# Patient Record
Sex: MALE | Race: WHITE | NOT HISPANIC OR LATINO | Employment: OTHER | URBAN - METROPOLITAN AREA
[De-identification: names, ages, dates, MRNs, and addresses within clinical notes are randomized per-mention and may not be internally consistent; named-entity substitution may affect disease eponyms.]

---

## 2017-03-18 ENCOUNTER — TELEPHONE (OUTPATIENT)
Dept: FAMILY MEDICINE | Facility: CLINIC | Age: 62
End: 2017-03-18

## 2017-04-28 ENCOUNTER — ALLIED HEALTH/NURSE VISIT (OUTPATIENT)
Dept: CARDIOLOGY | Facility: CLINIC | Age: 62
End: 2017-04-28
Payer: COMMERCIAL

## 2017-04-28 DIAGNOSIS — Z45.09 ENCOUNTER FOR LOOP RECORDER CHECK: Primary | ICD-10-CM

## 2017-04-28 PROCEDURE — 93299 C ICM/ILR REMOTE TECH SERV: CPT | Performed by: INTERNAL MEDICINE

## 2017-04-28 PROCEDURE — 93298 REM INTERROG DEV EVAL SCRMS: CPT | Performed by: INTERNAL MEDICINE

## 2017-04-28 NOTE — MR AVS SNAPSHOT
After Visit Summary   4/28/2017    Manan Bryant    MRN: [id number]           Patient Information     Date Of Birth          1955        Visit Information        Provider Department      4/28/2017 4:30 PM PEDERSON DCR2 [facility]        Today's Diagnoses     Encounter for loop recorder check    -  1       Follow-ups after your visit        Your next 10 appointments already scheduled     Jul 12, 2017  3:10 PM CDT   Return Visit with ROWAN Vail CNP   [facility] ([facility])    [address] [place] [address] [address]  [facility] [id number] [phone]            Aug 07, 2017  4:30 PM CDT   Remote ICD Check with PEDERSON DCR2   [facility] ([facility])    [address] [place] [address] [address]  [facility] [id number] [phone]           This appointment is for a remote check of your debrillator.  This is not an appointment at the office.              Who to contact     If you have questions or need follow up information about today's clinic visit or your schedule please contact [facility] directly at [phone].  Normal or non-critical lab and imaging results will be communicated to you by Thermal Nomadhart, letter or phone within 4 business days after the clinic has received the results. If you do not hear from us within 7 days, please contact the clinic through Thermal Nomadhart or phone. If you have a critical or abnormal lab result, we will notify you by phone as soon as possible.  Submit refill requests through Cellartis or call your pharmacy and they will forward the refill request to us. Please allow 3 business days for your refill to be completed.          Additional Information About Your Visit        MyChart Information     Cellartis gives you secure access to your electronic health record. If you see a primary care provider, you can also send  messages to your care team and make appointments. If you have questions, please call your primary care clinic.  If you do not have a primary care provider, please call [phone] and they will assist you.        Care EveryWhere ID     This is your Care EveryWhere ID. This could be used by other organizations to access your [facility] medical records  [id number]         Blood Pressure from Last 3 Encounters:   09/15/16 128/73   08/08/16 134/84   07/07/16 128/82    Weight from Last 3 Encounters:   09/15/16 100.7 kg (222 lb)   08/08/16 99.6 kg (219 lb 9.6 oz)   07/07/16 99.3 kg (219 lb)              We Performed the Following     C ICM DEVICE INTERROGAT REMOTE (18121)     ILR REMOTE TECH SERV (63148)        Primary Care Provider Office Phone # Fax #    Ric Villela [phone]       [facility] [address]  [facility] [address]        Thank you!     Thank you for choosing [facility] [place]  for your care. Our goal is always to provide you with excellent care. Hearing back from our patients is one way we can continue to improve our services. Please take a few minutes to complete the written survey that you may receive in the mail after your visit with us. Thank you!             Your Updated Medication List - Protect others around you: Learn how to safely use, store and throw away your medicines at www.disposemymeds.org.          This list is accurate as of: 4/28/17 11:59 PM.  Always use your most recent med list.                   Brand Name Dispense Instructions for use    ASPIRIN PO      Take 325 mg by mouth.       atorvastatin 40 MG tablet    LIPITOR    90 tablet    Take 1 tablet (40 mg) by mouth daily       ibuprofen 600 MG tablet    ADVIL/MOTRIN    30 tablet    Take 1 tablet (600 mg) by mouth every 6 hours as needed for moderate pain       metoprolol 50 MG 24 hr tablet    TOPROL-XL    90 tablet    Take 1 tablet (50 mg) by mouth  daily       MULTI-VITAMIN PO      Take by mouth daily       nitroglycerin 0.4 MG sublingual tablet    NITROSTAT    25 tablet    Place 1 tablet (0.4 mg) under the tongue every 5 minutes as needed for chest pain       omeprazole 20 MG CR capsule    priLOSEC    90 capsule    Take 1 capsule (20 mg) by mouth daily       TYLENOL PO      Take 1,000 mg by mouth every 4 hours as needed for mild pain or fever       VITAMIN D PO      Take by mouth daily

## 2017-04-28 NOTE — PROGRESS NOTES
Anuradha Loop recorder implant check: Pt sent remote loop recorder check today.   6 patient activated symptoms since last check 9/23/16. 5 with stored strips show SR with occasional PSCs and PVCs.   No VT/philippe,asystole or pauses.   Presenting rhtyhm is SR  Battery status good.     Called pt and reviewed events showing PACs and PVCs. Informed him to call us when he activates a symptom event and we will have him send a transmission. Monitor does not automatically send a pt activated symptoms.  Laurita

## 2017-07-07 ENCOUNTER — DOCUMENTATION ONLY (OUTPATIENT)
Dept: CARDIOLOGY | Facility: CLINIC | Age: 62
End: 2017-07-07

## 2017-07-10 DIAGNOSIS — E78.5 HYPERLIPIDEMIA LDL GOAL <70: ICD-10-CM

## 2017-07-10 DIAGNOSIS — K21.9 GASTROESOPHAGEAL REFLUX DISEASE WITHOUT ESOPHAGITIS: ICD-10-CM

## 2017-07-10 DIAGNOSIS — I25.10 CORONARY ARTERY DISEASE INVOLVING NATIVE CORONARY ARTERY OF NATIVE HEART WITHOUT ANGINA PECTORIS: ICD-10-CM

## 2017-07-10 RX ORDER — METOPROLOL SUCCINATE 50 MG/1
50 TABLET, EXTENDED RELEASE ORAL DAILY
Qty: 90 TABLET | Refills: 0 | Status: SHIPPED | OUTPATIENT
Start: 2017-07-10 | End: 2017-07-12

## 2017-07-10 NOTE — TELEPHONE ENCOUNTER
Left VM for patient to schedule      omeprazole (PRILOSEC) 20 MG CR capsule     Last Written Prescription Date: 5/13/2016  Last Fill Quantity: 90,  # refills: 3   Last Office Visit with [facility] or [facility] prescribing provider: 5/13/2016                                         Next 5 appointments (look out 90 days)     Jul 12, 2017  3:10 PM CDT   Return Visit with ROWAN Vail CNP   [facility] ([facility] [place] [facility])    [address] [place] [id number] [phone]                    atorvastatin (LIPITOR) 40 MG tablet      Last Written Prescription Date: 5/13/2016  Last Fill Quantity: 90, # refills: 3  Last Office Visit with [facility] or [facility] prescribing provider: 5/13/2016  Next 5 appointments (look out 90 days)     Jul 12, 2017  3:10 PM CDT   Return Visit with ROWAN Vail CNP   [facility] ([facility])    [address] [place] [address] [phone]                   Lab Results   Component Value Date    CHOL 126 06/29/2016     Lab Results   Component Value Date    HDL 34 06/29/2016     Lab Results   Component Value Date    LDL 68 06/29/2016     Lab Results   Component Value Date    TRIG 121 06/29/2016     Lab Results   Component Value Date    CHOLHDLRATIO 3.3 06/26/2015

## 2017-07-10 NOTE — TELEPHONE ENCOUNTER
Received refill request for:  Metoprolol Succ  Last OV was: 8/8/2016 with Dr. Wooten  Labs/EKG: n/a  F/U scheduled: 7/12/2017 with JASWANT Beltran  New script sent to: [facility]

## 2017-07-11 DIAGNOSIS — I25.10 CORONARY ARTERY DISEASE INVOLVING NATIVE CORONARY ARTERY OF NATIVE HEART WITHOUT ANGINA PECTORIS: ICD-10-CM

## 2017-07-11 PROCEDURE — 80061 LIPID PANEL: CPT | Performed by: INTERNAL MEDICINE

## 2017-07-11 PROCEDURE — 36415 COLL VENOUS BLD VENIPUNCTURE: CPT | Performed by: INTERNAL MEDICINE

## 2017-07-11 RX ORDER — ATORVASTATIN CALCIUM 40 MG/1
TABLET, FILM COATED ORAL
Qty: 30 TABLET | Refills: 0 | Status: SHIPPED | OUTPATIENT
Start: 2017-07-11 | End: 2017-07-12

## 2017-07-12 ENCOUNTER — OFFICE VISIT (OUTPATIENT)
Dept: CARDIOLOGY | Facility: CLINIC | Age: 62
End: 2017-07-12
Attending: INTERNAL MEDICINE
Payer: COMMERCIAL

## 2017-07-12 VITALS
HEIGHT: 71 IN | HEART RATE: 66 BPM | BODY MASS INDEX: 31.09 KG/M2 | DIASTOLIC BLOOD PRESSURE: 76 MMHG | WEIGHT: 222.1 LBS | SYSTOLIC BLOOD PRESSURE: 138 MMHG

## 2017-07-12 DIAGNOSIS — I25.10 CORONARY ARTERY DISEASE DUE TO CALCIFIED CORONARY LESION: ICD-10-CM

## 2017-07-12 DIAGNOSIS — K21.9 GASTROESOPHAGEAL REFLUX DISEASE WITHOUT ESOPHAGITIS: ICD-10-CM

## 2017-07-12 DIAGNOSIS — I47.10 SVT (SUPRAVENTRICULAR TACHYCARDIA) (H): ICD-10-CM

## 2017-07-12 DIAGNOSIS — I25.84 CORONARY ARTERY DISEASE DUE TO CALCIFIED CORONARY LESION: ICD-10-CM

## 2017-07-12 DIAGNOSIS — I25.10 CORONARY ARTERY DISEASE INVOLVING NATIVE CORONARY ARTERY OF NATIVE HEART WITHOUT ANGINA PECTORIS: ICD-10-CM

## 2017-07-12 DIAGNOSIS — E78.5 HYPERLIPIDEMIA LDL GOAL <70: ICD-10-CM

## 2017-07-12 DIAGNOSIS — I47.20 VT (VENTRICULAR TACHYCARDIA) (H): Primary | ICD-10-CM

## 2017-07-12 PROCEDURE — 99213 OFFICE O/P EST LOW 20 MIN: CPT | Performed by: NURSE PRACTITIONER

## 2017-07-12 RX ORDER — METOPROLOL SUCCINATE 50 MG/1
50 TABLET, EXTENDED RELEASE ORAL DAILY
Qty: 90 TABLET | Refills: 3 | Status: SHIPPED | OUTPATIENT
Start: 2017-07-12 | End: 2018-08-20

## 2017-07-12 RX ORDER — NITROGLYCERIN 0.4 MG/1
0.4 TABLET SUBLINGUAL EVERY 5 MIN PRN
Qty: 25 TABLET | Refills: 3 | Status: SHIPPED | OUTPATIENT
Start: 2017-07-12 | End: 2019-10-04

## 2017-07-12 RX ORDER — ATORVASTATIN CALCIUM 40 MG/1
40 TABLET, FILM COATED ORAL DAILY
Qty: 90 TABLET | Refills: 3 | Status: SHIPPED | OUTPATIENT
Start: 2017-07-12 | End: 2018-07-25

## 2017-07-12 NOTE — LETTER
7/12/2017    Ric Villela MD  [facility]   [address]  [facility] [address]    RE: Manan Bryant       Dear Colleague,    I had the pleasure of seeing Manan Bryant in the [facility].    Cardiology Clinic Progress Note  Manan Bryant MRN# [id number]   YOB: 1955 Age: 61 year old     Reason for visit: Annual follow up           Assessment and Plan:     1. Coronary artery disease    S/p anterior MI and stent to LAD in 2010    Continue current medical therapy with Toprol-XL, Lipitor and aspirin.    Follow-up with Dr. Corley in one year    2. Hypertension    Well controlled on current medical therapy    3. Hyperlidipemia    He had fasting lipids done yesterday through an outside clinic results are currently pending.    4. History of nonsustained VT and PVCs    No inducible VT on EP study    Status post implantable loop recorder    Continue loop recorder transmissions through the device clinic          History of Presenting Illness:    Manan Bryant is a very pleasant 61 year old patient of Dr. Corley who presents today for an annual follow-up.  His past medical history includes an acute anterior MI in August 2010.  Unfortunately, he did not seek medical attention for three days from the onset of pain.  His symptoms were muscle weakness and the patient initially thought he had Lyme's disease.  His LVEF at that time was 55-60% with apical hypokinesis.  Shortly after his hospitalization he was noted to have nonsustained VT and PVCs and was treated with beta blockers.    She was driving his car and suddenly became severely lightheaded.  His vision started to decrease and he felt like he might lose consciousness.  Fortunately was able to move his car to the side of the road and never did lose consciousness.  There is a cardiac event monitor showed nonsustained irregular atrial tachycardia for which she was asymptomatic.  He was seen by  "Dr. Wooten electrophysiology and ultimately underwent a EP study that did not induce atrial tachycardia.  He underwent an implantable loop recorder and to date has had no significant arrhythmias.    Today in clinic he states he occasionally has palpitations but as stated above there's been no significant arrhythmias other than PACs and PVCs.  He is currently tolerating his current dose of Toprol-XL at 50 mg daily.  He states that he has more difficult to respect the needed prior to being on beta blocker therapy but is currently able to bike at the level he desires.  He denies any shortness of breath on exertion, chest discomfort, peripheral edema, PND or orthopnea.          This note was completed in part using Dragon voice recognition software. Although reviewed after completion, some word and grammatical errors may occur       Review of Systems:   Review of Systems:  Skin:  Negative     Eyes:  Positive for glasses  ENT:  Negative    Respiratory:  Negative    Cardiovascular:  Negative for;chest pain palpitations;Positive for;lightheadedness;dizziness;fatigue  Gastroenterology: Positive for    Genitourinary:  Negative    Musculoskeletal:  Positive for joint pain  Neurologic:  Negative    Psychiatric:  Negative    Heme/Lymph/Imm:  Negative    Endocrine:  Negative                Physical Exam:     Vitals: /76  Pulse 66  Ht 1.803 m (5' 10.98\")  Wt 100.7 kg (222 lb 1.6 oz)  BMI 30.99 kg/m2  Constitutional:  cooperative, alert and oriented, well developed, well nourished, in no acute distress        Skin:  warm and dry to the touch, no apparent skin lesions or masses noted        Head:  normocephalic, no masses or lesions        Eyes:  pupils equal and round;conjunctivae and lids unremarkable        ENT:  no pallor or cyanosis, dentition good        Chest:  clear to auscultation;normal symmetry        Cardiac: regular rhythm;normal S1 and S2;no S3 or S4;no murmurs, gallops or rubs detected              "     Abdomen:  abdomen soft;non-tender        Extremities and Back:  no edema        Neurological:  no gross motor deficits;affect appropriate                 Medications:     Current Outpatient Prescriptions   Medication Sig Dispense Refill     omeprazole (PRILOSEC) 20 MG CR capsule Take 1 capsule (20 mg) by mouth daily 90 capsule 3     nitroGLYcerin (NITROSTAT) 0.4 MG sublingual tablet Place 1 tablet (0.4 mg) under the tongue every 5 minutes as needed for chest pain 25 tablet 3     metoprolol (TOPROL-XL) 50 MG 24 hr tablet Take 1 tablet (50 mg) by mouth daily 90 tablet 3     atorvastatin (LIPITOR) 40 MG tablet Take 1 tablet (40 mg) by mouth daily 90 tablet 3     Acetaminophen (TYLENOL PO) Take 1,000 mg by mouth every 4 hours as needed for mild pain or fever       ibuprofen (ADVIL,MOTRIN) 600 MG tablet Take 1 tablet (600 mg) by mouth every 6 hours as needed for moderate pain 30 tablet 1     Multiple Vitamin (MULTI-VITAMIN PO) Take by mouth daily        Cholecalciferol (VITAMIN D PO) Take by mouth daily        ASPIRIN PO Take 325 mg by mouth.       [DISCONTINUED] atorvastatin (LIPITOR) 40 MG tablet TAKE ONE TABLET BY MOUTH ONCE DAILY 30 tablet 0     [DISCONTINUED] metoprolol (TOPROL-XL) 50 MG 24 hr tablet Take 1 tablet (50 mg) by mouth daily 90 tablet 0     [DISCONTINUED] nitroglycerin (NITROSTAT) 0.4 MG SL tablet Place 1 tablet (0.4 mg) under the tongue every 5 minutes as needed for chest pain 25 tablet 11           Family History   Problem Relation Age of Onset     Coronary Artery Disease Father      Myocardial Infarction Father      Coronary Artery Disease Brother      Myocardial Infarction Brother      Heart Surgery Brother      bypass     Coronary Artery Disease Paternal Grandfather      Myocardial Infarction Paternal Grandfather      Sleep Apnea Sister      Family History Negative Mother      Family History Negative Maternal Grandmother      Heart Failure Maternal Grandfather      Family History Negative  Paternal Grandmother      Myocardial Infarction Brother      Coronary Artery Disease Brother      Heart Surgery Brother      bypass       Social History     Social History     Marital status:      Spouse name: N/A     Number of children: N/A     Years of education: N/A     Occupational History     Not on file.     Social History Main Topics     Smoking status: Never Smoker     Smokeless tobacco: Never Used     Alcohol use No     Drug use: No     Sexual activity: No     Other Topics Concern     Caffeine Concern No     decaf: 1-2 cups a day. Diek coke: 4-5 cans a day     Sleep Concern No     Stress Concern No     Weight Concern No     Special Diet Yes     limited, heart healthy     Exercise Yes     bike outside, 1-3x a week     Bike Helmet Yes     Seat Belt Yes     Social History Narrative            Past Medical History:     Past Medical History:   Diagnosis Date     CAD (coronary artery disease)     cardiac cath 8/5/2010: ELLIOTT to LAD     Family history of early CAD      History of acute anterior wall MI 2010 2010     History of colonic polyps      Hyperlipidemia LDL goal <70      Myocardial infarction (H)      Paroxysmal supraventricular tachycardia (H)      PVC (premature ventricular contraction)     Hx ventricular tachycardia during cardiac rehab 2010     SVT (supraventricular tachycardia) (H)               Past Surgical History:     Past Surgical History:   Procedure Laterality Date     STENT, CORONARY, EMEKA  8/2010    LAD              Allergies:   Review of patient's allergies indicates no known allergies.       Data:   All laboratory data reviewed:    LAST CHOLESTEROL:  Lab Results   Component Value Date    CHOL 126 06/29/2016     Lab Results   Component Value Date    HDL 34 06/29/2016     Lab Results   Component Value Date    LDL 68 06/29/2016     Lab Results   Component Value Date    TRIG 121 06/29/2016     Lab Results   Component Value Date    CHOLHDLRATIO 3.3 06/26/2015       LAST BMP:  Lab Results    Component Value Date     09/15/2016      Lab Results   Component Value Date    POTASSIUM 4.5 09/15/2016     Lab Results   Component Value Date    CHLORIDE 109 09/15/2016     Lab Results   Component Value Date    LES 8.8 09/15/2016     Lab Results   Component Value Date    CO2 25 09/15/2016     Lab Results   Component Value Date    BUN 16 09/15/2016     Lab Results   Component Value Date    CR 0.98 09/15/2016     Lab Results   Component Value Date    GLC 99 09/15/2016       LAST CBC:  Lab Results   Component Value Date    WBC 5.7 09/15/2016     Lab Results   Component Value Date    RBC 5.07 09/15/2016     Lab Results   Component Value Date    HGB 15.8 09/15/2016     Lab Results   Component Value Date    HCT 44.7 09/15/2016     Lab Results   Component Value Date    MCV 88 09/15/2016     Lab Results   Component Value Date    MCH 31.2 09/15/2016     Lab Results   Component Value Date    MCHC 35.3 09/15/2016     Lab Results   Component Value Date    RDW 12.9 09/15/2016     Lab Results   Component Value Date     09/15/2016       Thank you for allowing me to participate in the care of your patient.    Sincerely,     ROWAN HERMOSILLO [facility]

## 2017-07-12 NOTE — MR AVS SNAPSHOT
After Visit Summary   7/12/2017    Manan Bryant    MRN: [id number]           Patient Information     Date Of Birth          1955        Visit Information        Provider Department      7/12/2017 3:10 PM Elizabeth Richardson APRN CNP [facility]        Today's Diagnoses     VT (ventricular tachycardia) (H)    -  1    Coronary artery disease involving native coronary artery of native heart without angina pectoris        SVT (supraventricular tachycardia) (H)        Gastroesophageal reflux disease without esophagitis        Coronary artery disease due to calcified coronary lesion        Hyperlipidemia LDL goal <70           Follow-ups after your visit        Your next 10 appointments already scheduled     Aug 07, 2017  4:30 PM CDT   Remote ICD Check with PEDERSON DCR2   [facility] ([facility])    [address] [place] [address] [phone]           This appointment is for a remote check of your debrillator.  This is not an appointment at the office.              Who to contact     If you have questions or need follow up information about today's clinic visit or your schedule please contact [facility] directly at [phone].  Normal or non-critical lab and imaging results will be communicated to you by TranZfinityhart, letter or phone within 4 business days after the clinic has received the results. If you do not hear from us within 7 days, please contact the clinic through TranZfinityhart or phone. If you have a critical or abnormal lab result, we will notify you by phone as soon as possible.  Submit refill requests through Shopnation or call your pharmacy and they will forward the refill request to us. Please allow 3 business days for your refill to be completed.          Additional Information About Your Visit        MyChart Information     Shopnation gives you secure  "access to your electronic health record. If you see a primary care provider, you can also send messages to your care team and make appointments. If you have questions, please call your primary care clinic.  If you do not have a primary care provider, please call [phone] and they will assist you.        Care EveryWhere ID     This is your Care EveryWhere ID. This could be used by other organizations to access your [facility] medical records  [id number]        Your Vitals Were     Pulse Height BMI (Body Mass Index)             66 1.803 m (5' 10.98\") 30.99 kg/m2          Blood Pressure from Last 3 Encounters:   07/12/17 138/76   09/15/16 128/73   08/08/16 134/84    Weight from Last 3 Encounters:   07/12/17 100.7 kg (222 lb 1.6 oz)   09/15/16 100.7 kg (222 lb)   08/08/16 99.6 kg (219 lb 9.6 oz)              We Performed the Following     Follow-Up with Cardiac Advanced Practice Provider          Today's Medication Changes          These changes are accurate as of: 7/12/17  3:31 PM.  If you have any questions, ask your nurse or doctor.               These medicines have changed or have updated prescriptions.        Dose/Directions    atorvastatin 40 MG tablet   Commonly known as:  LIPITOR   This may have changed:  See the new instructions.   Used for:  Hyperlipidemia LDL goal <70   Changed by:  Elizabeth Richardson APRN CNP        Dose:  40 mg   Take 1 tablet (40 mg) by mouth daily   Quantity:  90 tablet   Refills:  3       omeprazole 20 MG CR capsule   Commonly known as:  priLOSEC   This may have changed:  See the new instructions.   Used for:  Gastroesophageal reflux disease without esophagitis   Changed by:  Elizabeth Richardson APRN CNP        Dose:  20 mg   Take 1 capsule (20 mg) by mouth daily   Quantity:  90 capsule   Refills:  3            Where to get your medicines      These medications were sent to [id number] - [place], [place] - [address] AT [address]  [address], " [place] [address]     Phone:  [phone]     atorvastatin 40 MG tablet    metoprolol 50 MG 24 hr tablet    nitroGLYcerin 0.4 MG sublingual tablet    omeprazole 20 MG CR capsule                Primary Care Provider Office Phone # Fax #    Ric Villela [phone]       [facility] [address]  [facility] [address]        Equal Access to Services     ANAYELI ERIC : Hadii aad ku hadasho Soomaali, waaxda luqadaha, qaybta kaalmada adeegyada, waxay idiin hayaan adeeg kharash la'sarahn . So [facility] [phone].    ATENCIÓN: Si habla espehsan, tiene a das disposición servicios gratuitos de asistencia lingüística. Jeevaname al [phone].    We comply with applicable federal civil rights laws and [place] laws. We do not discriminate on the basis of race, color, national origin, age, disability sex, sexual orientation or gender identity.            Thank you!     Thank you for choosing [facility] [place]  for your care. Our goal is always to provide you with excellent care. Hearing back from our patients is one way we can continue to improve our services. Please take a few minutes to complete the written survey that you may receive in the mail after your visit with us. Thank you!             Your Updated Medication List - Protect others around you: Learn how to safely use, store and throw away your medicines at www.disposemymeds.org.          This list is accurate as of: 7/12/17  3:31 PM.  Always use your most recent med list.                   Brand Name Dispense Instructions for use Diagnosis    ASPIRIN PO      Take 325 mg by mouth.        atorvastatin 40 MG tablet    LIPITOR    90 tablet    Take 1 tablet (40 mg) by mouth daily    Hyperlipidemia LDL goal <70       ibuprofen 600 MG tablet    ADVIL/MOTRIN    30 tablet    Take 1 tablet (600 mg) by mouth every 6 hours as needed for moderate pain        metoprolol 50 MG 24 hr tablet    TOPROL-XL    90  tablet    Take 1 tablet (50 mg) by mouth daily    Coronary artery disease involving native coronary artery of native heart without angina pectoris       MULTI-VITAMIN PO      Take by mouth daily        nitroGLYcerin 0.4 MG sublingual tablet    NITROSTAT    25 tablet    Place 1 tablet (0.4 mg) under the tongue every 5 minutes as needed for chest pain    Coronary artery disease due to calcified coronary lesion       omeprazole 20 MG CR capsule    priLOSEC    90 capsule    Take 1 capsule (20 mg) by mouth daily    Gastroesophageal reflux disease without esophagitis       TYLENOL PO      Take 1,000 mg by mouth every 4 hours as needed for mild pain or fever        VITAMIN D PO      Take by mouth daily

## 2017-07-12 NOTE — PROGRESS NOTES
Cardiology Clinic Progress Note  Manan Bryant MRN# [id number]   YOB: 1955 Age: 61 year old     Reason for visit: Annual follow up           Assessment and Plan:     1. Coronary artery disease    S/p anterior MI and stent to LAD in 2010    Continue current medical therapy with Toprol-XL, Lipitor and aspirin.    Follow-up with Dr. Corley in one year    2. Hypertension    Well controlled on current medical therapy    3. Hyperlidipemia    He had fasting lipids done yesterday through an outside clinic results are currently pending.    4. History of nonsustained VT and PVCs    No inducible VT on EP study    Status post implantable loop recorder    Continue loop recorder transmissions through the device clinic          History of Presenting Illness:    Manan Bryant is a very pleasant 61 year old patient of Dr. Corley who presents today for an annual follow-up.  His past medical history includes an acute anterior MI in August 2010.  Unfortunately, he did not seek medical attention for three days from the onset of pain.  His symptoms were muscle weakness and the patient initially thought he had Lyme's disease.  His LVEF at that time was 55-60% with apical hypokinesis.  Shortly after his hospitalization he was noted to have nonsustained VT and PVCs and was treated with beta blockers.    She was driving his car and suddenly became severely lightheaded.  His vision started to decrease and he felt like he might lose consciousness.  Fortunately was able to move his car to the side of the road and never did lose consciousness.  There is a cardiac event monitor showed nonsustained irregular atrial tachycardia for which she was asymptomatic.  He was seen by Dr. Wooten electrophysiology and ultimately underwent a EP study that did not induce atrial tachycardia.  He underwent an implantable loop recorder and to date has had no significant arrhythmias.    Today in clinic he states he occasionally has  "palpitations but as stated above there's been no significant arrhythmias other than PACs and PVCs.  He is currently tolerating his current dose of Toprol-XL at 50 mg daily.  He states that he has more difficult to respect the needed prior to being on beta blocker therapy but is currently able to bike at the level he desires.  He denies any shortness of breath on exertion, chest discomfort, peripheral edema, PND or orthopnea.          This note was completed in part using Dragon voice recognition software. Although reviewed after completion, some word and grammatical errors may occur       Review of Systems:   Review of Systems:  Skin:  Negative     Eyes:  Positive for glasses  ENT:  Negative    Respiratory:  Negative    Cardiovascular:  Negative for;chest pain palpitations;Positive for;lightheadedness;dizziness;fatigue  Gastroenterology: Positive for    Genitourinary:  Negative    Musculoskeletal:  Positive for joint pain  Neurologic:  Negative    Psychiatric:  Negative    Heme/Lymph/Imm:  Negative    Endocrine:  Negative                Physical Exam:     Vitals: /76  Pulse 66  Ht 1.803 m (5' 10.98\")  Wt 100.7 kg (222 lb 1.6 oz)  BMI 30.99 kg/m2  Constitutional:  cooperative, alert and oriented, well developed, well nourished, in no acute distress        Skin:  warm and dry to the touch, no apparent skin lesions or masses noted        Head:  normocephalic, no masses or lesions        Eyes:  pupils equal and round;conjunctivae and lids unremarkable        ENT:  no pallor or cyanosis, dentition good        Chest:  clear to auscultation;normal symmetry        Cardiac: regular rhythm;normal S1 and S2;no S3 or S4;no murmurs, gallops or rubs detected                  Abdomen:  abdomen soft;non-tender        Extremities and Back:  no edema        Neurological:  no gross motor deficits;affect appropriate                 Medications:     Current Outpatient Prescriptions   Medication Sig Dispense Refill     " omeprazole (PRILOSEC) 20 MG CR capsule Take 1 capsule (20 mg) by mouth daily 90 capsule 3     nitroGLYcerin (NITROSTAT) 0.4 MG sublingual tablet Place 1 tablet (0.4 mg) under the tongue every 5 minutes as needed for chest pain 25 tablet 3     metoprolol (TOPROL-XL) 50 MG 24 hr tablet Take 1 tablet (50 mg) by mouth daily 90 tablet 3     atorvastatin (LIPITOR) 40 MG tablet Take 1 tablet (40 mg) by mouth daily 90 tablet 3     Acetaminophen (TYLENOL PO) Take 1,000 mg by mouth every 4 hours as needed for mild pain or fever       ibuprofen (ADVIL,MOTRIN) 600 MG tablet Take 1 tablet (600 mg) by mouth every 6 hours as needed for moderate pain 30 tablet 1     Multiple Vitamin (MULTI-VITAMIN PO) Take by mouth daily        Cholecalciferol (VITAMIN D PO) Take by mouth daily        ASPIRIN PO Take 325 mg by mouth.       [DISCONTINUED] atorvastatin (LIPITOR) 40 MG tablet TAKE ONE TABLET BY MOUTH ONCE DAILY 30 tablet 0     [DISCONTINUED] metoprolol (TOPROL-XL) 50 MG 24 hr tablet Take 1 tablet (50 mg) by mouth daily 90 tablet 0     [DISCONTINUED] nitroglycerin (NITROSTAT) 0.4 MG SL tablet Place 1 tablet (0.4 mg) under the tongue every 5 minutes as needed for chest pain 25 tablet 11           Family History   Problem Relation Age of Onset     Coronary Artery Disease Father      Myocardial Infarction Father      Coronary Artery Disease Brother      Myocardial Infarction Brother      Heart Surgery Brother      bypass     Coronary Artery Disease Paternal Grandfather      Myocardial Infarction Paternal Grandfather      Sleep Apnea Sister      Family History Negative Mother      Family History Negative Maternal Grandmother      Heart Failure Maternal Grandfather      Family History Negative Paternal Grandmother      Myocardial Infarction Brother      Coronary Artery Disease Brother      Heart Surgery Brother      bypass       Social History     Social History     Marital status:      Spouse name: N/A     Number of children: N/A      Years of education: N/A     Occupational History     Not on file.     Social History Main Topics     Smoking status: Never Smoker     Smokeless tobacco: Never Used     Alcohol use No     Drug use: No     Sexual activity: No     Other Topics Concern     Caffeine Concern No     decaf: 1-2 cups a day. Diek coke: 4-5 cans a day     Sleep Concern No     Stress Concern No     Weight Concern No     Special Diet Yes     limited, heart healthy     Exercise Yes     bike outside, 1-3x a week     Bike Helmet Yes     Seat Belt Yes     Social History Narrative            Past Medical History:     Past Medical History:   Diagnosis Date     CAD (coronary artery disease)     cardiac cath 8/5/2010: ELLIOTT to LAD     Family history of early CAD      History of acute anterior wall MI 2010 2010     History of colonic polyps      Hyperlipidemia LDL goal <70      Myocardial infarction (H)      Paroxysmal supraventricular tachycardia (H)      PVC (premature ventricular contraction)     Hx ventricular tachycardia during cardiac rehab 2010     SVT (supraventricular tachycardia) (H)               Past Surgical History:     Past Surgical History:   Procedure Laterality Date     STENT, CORONARY, EMEKA  8/2010    LAD              Allergies:   Review of patient's allergies indicates no known allergies.       Data:   All laboratory data reviewed:    LAST CHOLESTEROL:  Lab Results   Component Value Date    CHOL 126 06/29/2016     Lab Results   Component Value Date    HDL 34 06/29/2016     Lab Results   Component Value Date    LDL 68 06/29/2016     Lab Results   Component Value Date    TRIG 121 06/29/2016     Lab Results   Component Value Date    CHOLHDLRATIO 3.3 06/26/2015       LAST BMP:  Lab Results   Component Value Date     09/15/2016      Lab Results   Component Value Date    POTASSIUM 4.5 09/15/2016     Lab Results   Component Value Date    CHLORIDE 109 09/15/2016     Lab Results   Component Value Date    LES 8.8 09/15/2016     Lab  Results   Component Value Date    CO2 25 09/15/2016     Lab Results   Component Value Date    BUN 16 09/15/2016     Lab Results   Component Value Date    CR 0.98 09/15/2016     Lab Results   Component Value Date    GLC 99 09/15/2016       LAST CBC:  Lab Results   Component Value Date    WBC 5.7 09/15/2016     Lab Results   Component Value Date    RBC 5.07 09/15/2016     Lab Results   Component Value Date    HGB 15.8 09/15/2016     Lab Results   Component Value Date    HCT 44.7 09/15/2016     Lab Results   Component Value Date    MCV 88 09/15/2016     Lab Results   Component Value Date    MCH 31.2 09/15/2016     Lab Results   Component Value Date    MCHC 35.3 09/15/2016     Lab Results   Component Value Date    RDW 12.9 09/15/2016     Lab Results   Component Value Date     09/15/2016         CECELIA PARADA, ROWAN, CNP

## 2017-07-13 LAB
CHOLEST SERPL-MCNC: 140 MG/DL
HDLC SERPL-MCNC: 45 MG/DL
LDLC SERPL CALC-MCNC: 66 MG/DL
NONHDLC SERPL-MCNC: 95 MG/DL
TRIGL SERPL-MCNC: 143 MG/DL

## 2017-07-14 ENCOUNTER — TELEPHONE (OUTPATIENT)
Dept: CARDIOLOGY | Facility: CLINIC | Age: 62
End: 2017-07-14

## 2017-07-14 NOTE — TELEPHONE ENCOUNTER
Patient would like his lipid profile released to My Chart.  Could you please show me how to do that?  INDER Azul

## 2017-08-15 ENCOUNTER — DOCUMENTATION ONLY (OUTPATIENT)
Dept: CARDIOLOGY | Facility: CLINIC | Age: 62
End: 2017-08-15

## 2017-08-15 NOTE — PROGRESS NOTES
Patient missed [employer] transmission on 8/7/17. Sent letter 8/8, no response. Sent 1 week letter today

## 2018-01-25 ENCOUNTER — ALLIED HEALTH/NURSE VISIT (OUTPATIENT)
Dept: CARDIOLOGY | Facility: CLINIC | Age: 63
End: 2018-01-25
Payer: COMMERCIAL

## 2018-01-25 ENCOUNTER — TELEPHONE (OUTPATIENT)
Dept: CARDIOLOGY | Facility: CLINIC | Age: 63
End: 2018-01-25

## 2018-01-25 ENCOUNTER — DOCUMENTATION ONLY (OUTPATIENT)
Dept: CARDIOLOGY | Facility: CLINIC | Age: 63
End: 2018-01-25

## 2018-01-25 DIAGNOSIS — Z45.09 ENCOUNTER FOR LOOP RECORDER CHECK: Primary | ICD-10-CM

## 2018-01-25 DIAGNOSIS — I47.20 VT (VENTRICULAR TACHYCARDIA) (H): ICD-10-CM

## 2018-01-25 PROCEDURE — 93299 C ICM/ILR REMOTE TECH SERV: CPT | Performed by: INTERNAL MEDICINE

## 2018-01-25 PROCEDURE — 93298 REM INTERROG DEV EVAL SCRMS: CPT | Performed by: INTERNAL MEDICINE

## 2018-01-25 NOTE — MR AVS SNAPSHOT
After Visit Summary   1/25/2018    Manan Bryant    MRN: [id number]           Patient Information     Date Of Birth          1955        Visit Information        Provider Department      1/25/2018 4:30 PM PEDERSON DCR2 [facility]        Today's Diagnoses     Encounter for loop recorder check    -  1    VT (ventricular tachycardia) (H)           Follow-ups after your visit        Your next 10 appointments already scheduled     Jan 25, 2018  4:30 PM CST   Remote ICD Check with PEDERSON DCR2   [facility] ([facility])    [id number]  [facility] [id number] [phone]           This appointment is for a remote check of your debrillator.  This is not an appointment at the office.            Jan 29, 2018 11:00 AM CST   Return Visit with ROWAN aVil CNP   [facility] ([facility])    [id number]  [facility] [id number] [phone]            May 14, 2018  4:30 PM CDT   Remote ICD Check with PEDESRON DCR2   [facility] ([facility])    [address] [address] [phone]           This appointment is for a remote check of your debrillator.  This is not an appointment at the office.              Who to contact     If you have questions or need follow up information about today's clinic visit or your schedule please contact [facility] directly at [phone].  Normal or non-critical lab and imaging results will be communicated to you by MyChart, letter or phone within 4 business days after the clinic has received the results. If you do not hear from us within 7 days, please contact the clinic through MyChart or phone. If you have a critical or abnormal lab result, we will notify you by phone as soon as possible.  Submit refill  requests through [employer] or call your pharmacy and they will forward the refill request to us. Please allow 3 business days for your refill to be completed.          Additional Information About Your Visit        [facility] Information     [employer] gives you secure access to your electronic health record. If you see a primary care provider, you can also send messages to your care team and make appointments. If you have questions, please call your primary care clinic.  If you do not have a primary care provider, please call [phone] and they will assist you.        Care EveryWhere ID     This is your Care EveryWhere ID. This could be used by other organizations to access your [place] medical records  [id number]         Blood Pressure from Last 3 Encounters:   07/12/17 138/76   09/15/16 128/73   08/08/16 134/84    Weight from Last 3 Encounters:   07/12/17 100.7 kg (222 lb 1.6 oz)   09/15/16 100.7 kg (222 lb)   08/08/16 99.6 kg (219 lb 9.6 oz)              We Performed the Following     C ICM DEVICE INTERROGAT REMOTE (24016)     ILR REMOTE TECH SERV (44483)        Primary Care Provider Office Phone # Fax #    Ric Marcos Villela [phone] [address] [address]        Equal Access to Services     [facility]: Hadii aad ku hadasho Soomaali, waaxda luqadaha, qaybta kaalmada adeegyada, waxay idiin hayaan riri quiles . So [facility] [phone].    ATENCIÓN: Si habla español, tiene a das disposición servicios gratuitos de asistencia lingüística. Sandra al [phone].    We comply with applicable federal civil rights laws and [place] laws. We do not discriminate on the basis of race, color, national origin, age, disability, sex, sexual orientation, or gender identity.            Thank you!     Thank you for choosing [facility]  for your care. Our goal is always to provide you with excellent care. Hearing back from our patients  is one way we can continue to improve our services. Please take a few minutes to complete the written survey that you may receive in the mail after your visit with us. Thank you!             Your Updated Medication List - Protect others around you: Learn how to safely use, store and throw away your medicines at www.disposemymeds.org.          This list is accurate as of 1/25/18  1:19 PM.  Always use your most recent med list.                   Brand Name Dispense Instructions for use Diagnosis    ASPIRIN PO      Take 325 mg by mouth.        atorvastatin 40 MG tablet    LIPITOR    90 tablet    Take 1 tablet (40 mg) by mouth daily    Hyperlipidemia LDL goal <70       ibuprofen 600 MG tablet    ADVIL/MOTRIN    30 tablet    Take 1 tablet (600 mg) by mouth every 6 hours as needed for moderate pain        metoprolol succinate 50 MG 24 hr tablet    TOPROL-XL    90 tablet    Take 1 tablet (50 mg) by mouth daily    Coronary artery disease involving native coronary artery of native heart without angina pectoris       MULTI-VITAMIN PO      Take by mouth daily        nitroGLYcerin 0.4 MG sublingual tablet    NITROSTAT    25 tablet    Place 1 tablet (0.4 mg) under the tongue every 5 minutes as needed for chest pain    Coronary artery disease due to calcified coronary lesion       omeprazole 20 MG CR capsule    priLOSEC    90 capsule    Take 1 capsule (20 mg) by mouth daily    Gastroesophageal reflux disease without esophagitis       TYLENOL PO      Take 1,000 mg by mouth every 4 hours as needed for mild pain or fever        VITAMIN D PO      Take by mouth daily

## 2018-01-25 NOTE — LETTER
Manan Bryant    [address] [place] [address]    January 25, 2018      Dear Manan Bryant,     Your transmission sent on 1-25-18 has been reviewed. It was determined the results are normal. No events were detected.   _____ Your next scheduled date for you to send a transmission is on 5-14-18.   _____ You are due for an in office appointment, please call [phone] to arrange.   Please call Eduardo at [phone] to change your appointment if needed. You may call and leave a message for a device RN if you have any questions at [phone] and they will return your call. Device clinic hours: Monday - Friday  8:00am-4:00pm   Sincerely,   Rosalie Wise RN

## 2018-01-25 NOTE — TELEPHONE ENCOUNTER
Pt left message asking if his remote ILR check came through. Called pt back, told him we got the remote and the results were normal. Informed him of next remote in May, and reminded him of OV with Elizabeth RILEY on Monday.

## 2018-01-25 NOTE — PROGRESS NOTES
[employer] Reveal Linq Loop Recorder   Symptom: 0 Tachy: 0 Pause: 0 Abraham: 0  AT: 0 AF: 0  Time in AT/AF: 0 %  Heart rate: Stable with adequate variability Battery: OK  Careplan: Next remote in 3 months. INDER Gastelum

## 2018-01-25 NOTE — PROGRESS NOTES
Call from scheduling stating that the patient is out front experiencing increased PVCs and fatigue, especially within the last 24 hours. Patient does have a loop recorder. Spoke with patient face to face. Patient denies any chest pain, shortness of breath, or lightheadedness. Patient is only worried because of the increased amount of PVCs and increased fatigue this morning. Patient states that he contacted his primary doctor who told him to have a strip run at the cardiology clinic. Patient also states he has an OV on Monday 1/29 with TYREL Elizabeth Balaya. Spoke with EP RN. Per EP RN, patient has not been in for a device check recently. Per EP RN, patient is able to send a transmission from home into the office and they are able to see what is going on. Reviewed this recommendation in person with patient. Patient verbalized understanding and agreed with plan of care. Also reviewed with patient to seek ER care if he develops any symptoms. Patient verbalized understanding and agreed with plan of care.

## 2018-05-18 ENCOUNTER — TELEPHONE (OUTPATIENT)
Dept: CARDIOLOGY | Facility: CLINIC | Age: 63
End: 2018-05-18

## 2018-05-18 DIAGNOSIS — I10 BENIGN ESSENTIAL HYPERTENSION: Primary | ICD-10-CM

## 2018-05-18 RX ORDER — LISINOPRIL 5 MG/1
5 TABLET ORAL DAILY
Qty: 30 TABLET | Refills: 0 | Status: SHIPPED | OUTPATIENT
Start: 2018-05-18 | End: 2018-05-21

## 2018-05-18 NOTE — TELEPHONE ENCOUNTER
Spoke with patient about Elizabeth's recommendations to add 5 mg of lisinopril daily and log his BP's over the weekend and bring with to the OV. Patient verbalized understanding and agreed with plan of care. Rx escripted to patient's preferred pharmacy.

## 2018-05-18 NOTE — TELEPHONE ENCOUNTER
Please have him start Lisinopril 5 mg daily and bring a log of his weekend BPs to our visit. Thanks Elizabeth

## 2018-05-18 NOTE — TELEPHONE ENCOUNTER
Call back from patient. Patient states that he went to the [facility] in [facility] and they did not have a BP cuff. Patient states that he decided to purchase a wrist BP cuff, as he wanted to invest in one that would be portable and he could take it with him. Patient states that he has taken in a couple times in the past hour and a half, and it has been running in the 150s-160s/90s. Patient still endorses a headache, but denies any shortness of breath or chest pain. Patient states that he did make an appointment for Monday, May 21 with Elizabeth, but patient would like to know if he needs to make any medication changes prior to Monday. Will route to Elizabeth for review in Dr. Corley's absence.

## 2018-05-18 NOTE — TELEPHONE ENCOUNTER
VM from patient stating that he has been having high BP readings in the evening, around 160-170/90s, which is not normal for the patient. Patient is wondering if his BP cuff at home is still working right. Spoke with patient. Patient states that he takes 50 mg of Toprol XL in the morning and normally only checks his BP in the evening after work. Patient states that it has never been this high after work before. Patient states this has been happening for about a week. Patient states that he has a headache in the evening now when his BP is running that high. Patient states that his wife also thinks he looks flushed. Patient is wondering if his cuff isn't normal. Instructed patient to go to a local [facility] or [facility] to the pharmacy and to take his BP there and call back with the reading. Patient verbalized understanding and agreed with plan of care.

## 2018-05-20 ENCOUNTER — MYC MEDICAL ADVICE (OUTPATIENT)
Dept: FAMILY MEDICINE | Facility: CLINIC | Age: 63
End: 2018-05-20

## 2018-05-20 DIAGNOSIS — Z00.00 ENCOUNTER FOR ROUTINE ADULT HEALTH EXAMINATION WITHOUT ABNORMAL FINDINGS: Primary | ICD-10-CM

## 2018-05-21 ENCOUNTER — OFFICE VISIT (OUTPATIENT)
Dept: CARDIOLOGY | Facility: CLINIC | Age: 63
End: 2018-05-21
Payer: COMMERCIAL

## 2018-05-21 ENCOUNTER — ALLIED HEALTH/NURSE VISIT (OUTPATIENT)
Dept: CARDIOLOGY | Facility: CLINIC | Age: 63
End: 2018-05-21
Payer: COMMERCIAL

## 2018-05-21 VITALS
DIASTOLIC BLOOD PRESSURE: 80 MMHG | HEART RATE: 60 BPM | SYSTOLIC BLOOD PRESSURE: 128 MMHG | WEIGHT: 218 LBS | HEIGHT: 71 IN | BODY MASS INDEX: 30.52 KG/M2

## 2018-05-21 DIAGNOSIS — I10 BENIGN ESSENTIAL HYPERTENSION: ICD-10-CM

## 2018-05-21 DIAGNOSIS — I25.10 CORONARY ARTERY DISEASE INVOLVING NATIVE CORONARY ARTERY OF NATIVE HEART WITHOUT ANGINA PECTORIS: Chronic | ICD-10-CM

## 2018-05-21 DIAGNOSIS — E78.5 HYPERLIPIDEMIA LDL GOAL <70: Primary | Chronic | ICD-10-CM

## 2018-05-21 DIAGNOSIS — Z12.5 SCREENING FOR PROSTATE CANCER: ICD-10-CM

## 2018-05-21 DIAGNOSIS — R55 SYNCOPE: Primary | ICD-10-CM

## 2018-05-21 DIAGNOSIS — E78.5 HYPERLIPIDEMIA LDL GOAL <70: Chronic | ICD-10-CM

## 2018-05-21 DIAGNOSIS — Z45.09 ENCOUNTER FOR LOOP RECORDER CHECK: ICD-10-CM

## 2018-05-21 LAB
ALBUMIN SERPL-MCNC: 4 G/DL (ref 3.4–5)
ALP SERPL-CCNC: 125 U/L (ref 40–150)
ALT SERPL W P-5'-P-CCNC: 65 U/L (ref 0–70)
ANION GAP SERPL CALCULATED.3IONS-SCNC: 5 MMOL/L (ref 3–14)
AST SERPL W P-5'-P-CCNC: 50 U/L (ref 0–45)
BASOPHILS # BLD AUTO: 0 10E9/L (ref 0–0.2)
BASOPHILS NFR BLD AUTO: 0.2 %
BILIRUB SERPL-MCNC: 0.9 MG/DL (ref 0.2–1.3)
BUN SERPL-MCNC: 17 MG/DL (ref 7–30)
CALCIUM SERPL-MCNC: 9.1 MG/DL (ref 8.5–10.1)
CHLORIDE SERPL-SCNC: 108 MMOL/L (ref 94–109)
CHOLEST SERPL-MCNC: 136 MG/DL
CO2 SERPL-SCNC: 27 MMOL/L (ref 20–32)
CREAT SERPL-MCNC: 1.16 MG/DL (ref 0.66–1.25)
DIFFERENTIAL METHOD BLD: NORMAL
EOSINOPHIL # BLD AUTO: 0.1 10E9/L (ref 0–0.7)
EOSINOPHIL NFR BLD AUTO: 0.9 %
ERYTHROCYTE [DISTWIDTH] IN BLOOD BY AUTOMATED COUNT: 13.3 % (ref 10–15)
GFR SERPL CREATININE-BSD FRML MDRD: 64 ML/MIN/1.7M2
GLUCOSE SERPL-MCNC: 95 MG/DL (ref 70–99)
HBA1C MFR BLD: 5.8 % (ref 0–5.6)
HCT VFR BLD AUTO: 45.5 % (ref 40–53)
HDLC SERPL-MCNC: 43 MG/DL
HGB BLD-MCNC: 15.7 G/DL (ref 13.3–17.7)
IMM GRANULOCYTES # BLD: 0 10E9/L (ref 0–0.4)
IMM GRANULOCYTES NFR BLD: 0.2 %
LDLC SERPL CALC-MCNC: 75 MG/DL
LYMPHOCYTES # BLD AUTO: 1.6 10E9/L (ref 0.8–5.3)
LYMPHOCYTES NFR BLD AUTO: 25.2 %
MCH RBC QN AUTO: 30.7 PG (ref 26.5–33)
MCHC RBC AUTO-ENTMCNC: 34.5 G/DL (ref 31.5–36.5)
MCV RBC AUTO: 89 FL (ref 78–100)
MONOCYTES # BLD AUTO: 0.5 10E9/L (ref 0–1.3)
MONOCYTES NFR BLD AUTO: 8.5 %
NEUTROPHILS # BLD AUTO: 4.2 10E9/L (ref 1.6–8.3)
NEUTROPHILS NFR BLD AUTO: 65 %
NONHDLC SERPL-MCNC: 93 MG/DL
PLATELET # BLD AUTO: 190 10E9/L (ref 150–450)
POTASSIUM SERPL-SCNC: 4.4 MMOL/L (ref 3.4–5.3)
PROT SERPL-MCNC: 7.5 G/DL (ref 6.8–8.8)
PSA SERPL-ACNC: 0.83 UG/L (ref 0–4)
RBC # BLD AUTO: 5.12 10E12/L (ref 4.4–5.9)
SODIUM SERPL-SCNC: 140 MMOL/L (ref 133–144)
TRIGL SERPL-MCNC: 88 MG/DL
WBC # BLD AUTO: 6.4 10E9/L (ref 4–11)

## 2018-05-21 PROCEDURE — 85025 COMPLETE CBC W/AUTO DIFF WBC: CPT | Performed by: NURSE PRACTITIONER

## 2018-05-21 PROCEDURE — G0103 PSA SCREENING: HCPCS | Performed by: NURSE PRACTITIONER

## 2018-05-21 PROCEDURE — 36415 COLL VENOUS BLD VENIPUNCTURE: CPT | Performed by: NURSE PRACTITIONER

## 2018-05-21 PROCEDURE — 99214 OFFICE O/P EST MOD 30 MIN: CPT | Performed by: NURSE PRACTITIONER

## 2018-05-21 PROCEDURE — 80053 COMPREHEN METABOLIC PANEL: CPT | Performed by: NURSE PRACTITIONER

## 2018-05-21 PROCEDURE — 83036 HEMOGLOBIN GLYCOSYLATED A1C: CPT | Performed by: NURSE PRACTITIONER

## 2018-05-21 PROCEDURE — 80061 LIPID PANEL: CPT | Performed by: NURSE PRACTITIONER

## 2018-05-21 PROCEDURE — 93298 REM INTERROG DEV EVAL SCRMS: CPT | Performed by: INTERNAL MEDICINE

## 2018-05-21 RX ORDER — LISINOPRIL 2.5 MG/1
2.5 TABLET ORAL DAILY
Qty: 30 TABLET | Refills: 11 | Status: SHIPPED | OUTPATIENT
Start: 2018-05-21 | End: 2018-06-01

## 2018-05-21 RX ORDER — LISINOPRIL 2.5 MG/1
5 TABLET ORAL DAILY
Qty: 30 TABLET | Refills: 11 | Status: SHIPPED | OUTPATIENT
Start: 2018-05-21 | End: 2018-05-21

## 2018-05-21 NOTE — LETTER
5/21/2018    Ric Villela MD, MD  [address]  [facility] [address]    RE: Manan Bryant       Dear Colleague,    I had the pleasure of seeing Manan Bryant in the [facility].    Cardiology Clinic Progress Note  Manan Bryant MRN# [id number]   YOB: 1955 Age: 62 year old     Reason for visit: Annual follow up           Assessment and Plan:     1. Hypertension    Orthostasis with position change    No further headaches with improvement in hypertension    SBP improved to 115-130 mmHg    Decrease Lisinopril to 2.5 mg daily    CMP today (PSA, Hgb A1c, CBC with differential and lipids per patient request for upcoming office visit with Dr. Villela)    Due to annual follow up with Dr. Corley this summer    2. Coronary artery disease    S/p anterior MI and stent to LAD in 2010    Continue current medical therapy with Lisinopril, Toprol-XL, Lipitor and aspirin.    3. Hyperlidipemia    Fasting lipids today    4. History of nonsustained VT and PVCs    No inducible VT on EP study    Status post implantable loop recorder    Continue loop recorder transmissions through the device clinic          History of Presenting Illness:    Manan Bryant is a very pleasant 62 year old patient of Dr. Corley who presents today for a hypertension follow-up. He contacted the clinic with reports of facial flushing, headaches and elevated BP for the past 2-3 months. Lisinopril 5 mg was added to his regmin.  He has had improvement in blood pressure control with the addition of the lisinopril, but complains of lightheadedness with positional changes.  He has had improvement in his headaches with improved blood pressure control.  He has follow-up with his primary care provider in a few weeks and request lab draws today to be reviewed by his primary care provider.    His past medical history includes an acute anterior MI in August 2010.  Unfortunately, he did not seek medical  "attention for three days from the onset of pain.  His symptoms were muscle weakness and the patient initially thought he had Lyme's disease.  His LVEF at that time was 55-60% with apical hypokinesis.  Shortly after his hospitalization he was noted to have nonsustained VT and PVCs and was treated with beta blockers.    He was driving his car and suddenly became severely lightheaded.  His vision started to decrease and he felt like he might lose consciousness.  Fortunately was able to move his car to the side of the road and never did lose consciousness.  There is a cardiac event monitor showed nonsustained irregular atrial tachycardia for which she was asymptomatic.  He was seen by Dr. Wooten electrophysiology and ultimately underwent a EP study that did not induce atrial tachycardia.  He underwent an implantable loop recorder and to date has had no significant arrhythmias.            This note was completed in part using Dragon voice recognition software. Although reviewed after completion, some word and grammatical errors may occur       Review of Systems:   Review of Systems:  Skin:  Negative     Eyes:  Positive for glasses  ENT:  Negative epistaxis  Respiratory:  Negative dyspnea on exertion;shortness of breath  Cardiovascular:  Negative for;chest pain palpitations;Positive for;dizziness;lightheadedness;fatigue  Gastroenterology: Negative    Genitourinary:  Negative hematuria  Musculoskeletal:  Positive for joint pain  Neurologic:  Negative    Psychiatric:  Negative    Heme/Lymph/Imm:  Negative bleeding disorder  Endocrine:  Negative                Physical Exam:     Vitals: /80  Pulse 60  Ht 1.803 m (5' 11\")  Wt 98.9 kg (218 lb)  BMI 30.4 kg/m2  Constitutional:  cooperative, alert and oriented, well developed, well nourished, in no acute distress overweight      Skin:  warm and dry to the touch, no apparent skin lesions or masses noted        Head:  normocephalic, no masses or lesions        Eyes:  " pupils equal and round;conjunctivae and lids unremarkable        ENT:  no pallor or cyanosis, dentition good        Chest:  clear to auscultation;normal symmetry        Cardiac: regular rhythm;normal S1 and S2;no S3 or S4;no murmurs, gallops or rubs detected occasional premature beats                Abdomen:  abdomen soft;non-tender        Extremities and Back:  no edema        Neurological:  no gross motor deficits;affect appropriate                 Medications:     Current Outpatient Prescriptions   Medication Sig Dispense Refill     Acetaminophen (TYLENOL PO) Take 1,000 mg by mouth every 4 hours as needed for mild pain or fever       ASPIRIN PO Take 325 mg by mouth.       atorvastatin (LIPITOR) 40 MG tablet Take 1 tablet (40 mg) by mouth daily 90 tablet 3     Cholecalciferol (VITAMIN D PO) Take 1,000 mg by mouth daily        ibuprofen (ADVIL,MOTRIN) 600 MG tablet Take 1 tablet (600 mg) by mouth every 6 hours as needed for moderate pain 30 tablet 1     lisinopril (PRINIVIL/ZESTRIL) 2.5 MG tablet Take 1 tablet (2.5 mg) by mouth daily 30 tablet 11     metoprolol (TOPROL-XL) 50 MG 24 hr tablet Take 1 tablet (50 mg) by mouth daily 90 tablet 3     Multiple Vitamin (MULTI-VITAMIN PO) Take by mouth daily        nitroGLYcerin (NITROSTAT) 0.4 MG sublingual tablet Place 1 tablet (0.4 mg) under the tongue every 5 minutes as needed for chest pain 25 tablet 3     omeprazole (PRILOSEC) 20 MG CR capsule Take 1 capsule (20 mg) by mouth daily 90 capsule 3     [DISCONTINUED] lisinopril (PRINIVIL/ZESTRIL) 2.5 MG tablet Take 2 tablets (5 mg) by mouth daily 30 tablet 11     [DISCONTINUED] lisinopril (PRINIVIL/ZESTRIL) 5 MG tablet Take 1 tablet (5 mg) by mouth daily 30 tablet 0           Family History   Problem Relation Age of Onset     Coronary Artery Disease Father      Myocardial Infarction Father      Coronary Artery Disease Brother      Myocardial Infarction Brother      Heart Surgery Brother      bypass     Coronary Artery  Disease Paternal Grandfather      Myocardial Infarction Paternal Grandfather      Sleep Apnea Sister      Family History Negative Mother      Family History Negative Maternal Grandmother      Heart Failure Maternal Grandfather      Family History Negative Paternal Grandmother      Myocardial Infarction Brother      Coronary Artery Disease Brother      Heart Surgery Brother      bypass       Social History     Social History     Marital status:      Spouse name: N/A     Number of children: N/A     Years of education: N/A     Occupational History     Not on file.     Social History Main Topics     Smoking status: Never Smoker     Smokeless tobacco: Never Used     Alcohol use No     Drug use: No     Sexual activity: No     Other Topics Concern     Caffeine Concern No     decaf: 1-2 cups a day. Diek coke: 4-5 cans a day     Sleep Concern No     Stress Concern No     Weight Concern No     Special Diet Yes     limited, heart healthy     Exercise Yes     bike outside, 1-3x a week     Bike Helmet Yes     Seat Belt Yes     Social History Narrative            Past Medical History:     Past Medical History:   Diagnosis Date     CAD (coronary artery disease)     cardiac cath 8/5/2010: ELLIOTT to LAD     Family history of early CAD      History of acute anterior wall MI 2010 2010     History of colonic polyps      Hyperlipidemia LDL goal <70      Myocardial infarction      Paroxysmal supraventricular tachycardia (H)      PVC (premature ventricular contraction)     Hx ventricular tachycardia during cardiac rehab 2010     SVT (supraventricular tachycardia) (H)               Past Surgical History:     Past Surgical History:   Procedure Laterality Date     STENT, CORONARY, EMEKA  8/2010    LAD              Allergies:   Review of patient's allergies indicates no known allergies.       Data:   All laboratory data reviewed:    Last Basic Metabolic Panel:  Lab Results   Component Value Date     09/15/2016      Lab Results    Component Value Date    POTASSIUM 4.5 09/15/2016     Lab Results   Component Value Date    CHLORIDE 109 09/15/2016     Lab Results   Component Value Date    LES 8.8 09/15/2016     Lab Results   Component Value Date    CO2 25 09/15/2016     Lab Results   Component Value Date    BUN 16 09/15/2016     Lab Results   Component Value Date    CR 0.98 09/15/2016     Lab Results   Component Value Date    GLC 99 09/15/2016     Lab Results   Component Value Date    CHOL 140 07/11/2017     Lab Results   Component Value Date    HDL 45 07/11/2017     Lab Results   Component Value Date    LDL 66 07/11/2017     Lab Results   Component Value Date    TRIG 143 07/11/2017     Lab Results   Component Value Date    CHOLHDLRATIO 3.3 06/26/2015         Thank you for allowing me to participate in the care of your patient.    Sincerely,     ROWAN HERMOSILLO [facility]

## 2018-05-21 NOTE — PROGRESS NOTES
Cardiology Clinic Progress Note  Manan Bryant MRN# [id number]   YOB: 1955 Age: 62 year old     Reason for visit: Annual follow up           Assessment and Plan:     1. Hypertension    Orthostasis with position change    No further headaches with improvement in hypertension    SBP improved to 115-130 mmHg    Decrease Lisinopril to 2.5 mg daily    CMP today (PSA, Hgb A1c, CBC with differential and lipids per patient request for upcoming office visit with Dr. Villela)    Due to annual follow up with Dr. Corley this summer    2. Coronary artery disease    S/p anterior MI and stent to LAD in 2010    Continue current medical therapy with Lisinopril, Toprol-XL, Lipitor and aspirin.    3. Hyperlidipemia    Fasting lipids today    4. History of nonsustained VT and PVCs    No inducible VT on EP study    Status post implantable loop recorder    Continue loop recorder transmissions through the device clinic          History of Presenting Illness:    Manan Bryant is a very pleasant 62 year old patient of Dr. Corley who presents today for a hypertension follow-up. He contacted the clinic with reports of facial flushing, headaches and elevated BP for the past 2-3 months. Lisinopril 5 mg was added to his regmin.  He has had improvement in blood pressure control with the addition of the lisinopril, but complains of lightheadedness with positional changes.  He has had improvement in his headaches with improved blood pressure control.  He has follow-up with his primary care provider in a few weeks and request lab draws today to be reviewed by his primary care provider.    His past medical history includes an acute anterior MI in August 2010.  Unfortunately, he did not seek medical attention for three days from the onset of pain.  His symptoms were muscle weakness and the patient initially thought he had Lyme's disease.  His LVEF at that time was 55-60% with apical hypokinesis.  Shortly after his  "hospitalization he was noted to have nonsustained VT and PVCs and was treated with beta blockers.    He was driving his car and suddenly became severely lightheaded.  His vision started to decrease and he felt like he might lose consciousness.  Fortunately was able to move his car to the side of the road and never did lose consciousness.  There is a cardiac event monitor showed nonsustained irregular atrial tachycardia for which she was asymptomatic.  He was seen by Dr. Wooten electrophysiology and ultimately underwent a EP study that did not induce atrial tachycardia.  He underwent an implantable loop recorder and to date has had no significant arrhythmias.            This note was completed in part using Dragon voice recognition software. Although reviewed after completion, some word and grammatical errors may occur       Review of Systems:   Review of Systems:  Skin:  Negative     Eyes:  Positive for glasses  ENT:  Negative epistaxis  Respiratory:  Negative dyspnea on exertion;shortness of breath  Cardiovascular:  Negative for;chest pain palpitations;Positive for;dizziness;lightheadedness;fatigue  Gastroenterology: Negative    Genitourinary:  Negative hematuria  Musculoskeletal:  Positive for joint pain  Neurologic:  Negative    Psychiatric:  Negative    Heme/Lymph/Imm:  Negative bleeding disorder  Endocrine:  Negative                Physical Exam:     Vitals: /80  Pulse 60  Ht 1.803 m (5' 11\")  Wt 98.9 kg (218 lb)  BMI 30.4 kg/m2  Constitutional:  cooperative, alert and oriented, well developed, well nourished, in no acute distress overweight      Skin:  warm and dry to the touch, no apparent skin lesions or masses noted        Head:  normocephalic, no masses or lesions        Eyes:  pupils equal and round;conjunctivae and lids unremarkable        ENT:  no pallor or cyanosis, dentition good        Chest:  clear to auscultation;normal symmetry        Cardiac: regular rhythm;normal S1 and S2;no S3 or " S4;no murmurs, gallops or rubs detected occasional premature beats                Abdomen:  abdomen soft;non-tender        Extremities and Back:  no edema        Neurological:  no gross motor deficits;affect appropriate                 Medications:     Current Outpatient Prescriptions   Medication Sig Dispense Refill     Acetaminophen (TYLENOL PO) Take 1,000 mg by mouth every 4 hours as needed for mild pain or fever       ASPIRIN PO Take 325 mg by mouth.       atorvastatin (LIPITOR) 40 MG tablet Take 1 tablet (40 mg) by mouth daily 90 tablet 3     Cholecalciferol (VITAMIN D PO) Take 1,000 mg by mouth daily        ibuprofen (ADVIL,MOTRIN) 600 MG tablet Take 1 tablet (600 mg) by mouth every 6 hours as needed for moderate pain 30 tablet 1     lisinopril (PRINIVIL/ZESTRIL) 2.5 MG tablet Take 1 tablet (2.5 mg) by mouth daily 30 tablet 11     metoprolol (TOPROL-XL) 50 MG 24 hr tablet Take 1 tablet (50 mg) by mouth daily 90 tablet 3     Multiple Vitamin (MULTI-VITAMIN PO) Take by mouth daily        nitroGLYcerin (NITROSTAT) 0.4 MG sublingual tablet Place 1 tablet (0.4 mg) under the tongue every 5 minutes as needed for chest pain 25 tablet 3     omeprazole (PRILOSEC) 20 MG CR capsule Take 1 capsule (20 mg) by mouth daily 90 capsule 3     [DISCONTINUED] lisinopril (PRINIVIL/ZESTRIL) 2.5 MG tablet Take 2 tablets (5 mg) by mouth daily 30 tablet 11     [DISCONTINUED] lisinopril (PRINIVIL/ZESTRIL) 5 MG tablet Take 1 tablet (5 mg) by mouth daily 30 tablet 0           Family History   Problem Relation Age of Onset     Coronary Artery Disease Father      Myocardial Infarction Father      Coronary Artery Disease Brother      Myocardial Infarction Brother      Heart Surgery Brother      bypass     Coronary Artery Disease Paternal Grandfather      Myocardial Infarction Paternal Grandfather      Sleep Apnea Sister      Family History Negative Mother      Family History Negative Maternal Grandmother      Heart Failure Maternal  Grandfather      Family History Negative Paternal Grandmother      Myocardial Infarction Brother      Coronary Artery Disease Brother      Heart Surgery Brother      bypass       Social History     Social History     Marital status:      Spouse name: N/A     Number of children: N/A     Years of education: N/A     Occupational History     Not on file.     Social History Main Topics     Smoking status: Never Smoker     Smokeless tobacco: Never Used     Alcohol use No     Drug use: No     Sexual activity: No     Other Topics Concern     Caffeine Concern No     decaf: 1-2 cups a day. Diek coke: 4-5 cans a day     Sleep Concern No     Stress Concern No     Weight Concern No     Special Diet Yes     limited, heart healthy     Exercise Yes     bike outside, 1-3x a week     Bike Helmet Yes     Seat Belt Yes     Social History Narrative            Past Medical History:     Past Medical History:   Diagnosis Date     CAD (coronary artery disease)     cardiac cath 8/5/2010: ELLIOTT to LAD     Family history of early CAD      History of acute anterior wall MI 2010 2010     History of colonic polyps      Hyperlipidemia LDL goal <70      Myocardial infarction      Paroxysmal supraventricular tachycardia (H)      PVC (premature ventricular contraction)     Hx ventricular tachycardia during cardiac rehab 2010     SVT (supraventricular tachycardia) (H)               Past Surgical History:     Past Surgical History:   Procedure Laterality Date     STENT, CORONARY, EMEKA  8/2010    LAD              Allergies:   Review of patient's allergies indicates no known allergies.       Data:   All laboratory data reviewed:    Last Basic Metabolic Panel:  Lab Results   Component Value Date     09/15/2016      Lab Results   Component Value Date    POTASSIUM 4.5 09/15/2016     Lab Results   Component Value Date    CHLORIDE 109 09/15/2016     Lab Results   Component Value Date    LES 8.8 09/15/2016     Lab Results   Component Value Date     CO2 25 09/15/2016     Lab Results   Component Value Date    BUN 16 09/15/2016     Lab Results   Component Value Date    CR 0.98 09/15/2016     Lab Results   Component Value Date    GLC 99 09/15/2016     Lab Results   Component Value Date    CHOL 140 07/11/2017     Lab Results   Component Value Date    HDL 45 07/11/2017     Lab Results   Component Value Date    LDL 66 07/11/2017     Lab Results   Component Value Date    TRIG 143 07/11/2017     Lab Results   Component Value Date    CHOLHDLRATIO 3.3 06/26/2015       ROWAN HERMOSILLO, CNP

## 2018-05-21 NOTE — MR AVS SNAPSHOT
After Visit Summary   5/21/2018    Manan Bryant    MRN: [id number]           Patient Information     Date Of Birth          1955        Visit Information        Provider Department      5/21/2018 9:00 AM Elizabeth Richardson APRN CNP [facility]        Today's Diagnoses     Hyperlipidemia LDL goal <70    -  1    Coronary artery disease involving native coronary artery of native heart without angina pectoris        Benign essential hypertension        Screening for prostate cancer          Care Instructions    Today's Recommendations    1. Decrease Lisinopril to 2.5 mg once a day  2. Continue monitor BP and symptoms  3. Lab work today  4. Continue all other medications without changes.  5. Please follow up with Dr. Corley in next availble.    Please send a [employer] message or call [phone] with questions or concerns.     Scheduling number [phone].            Follow-ups after your visit        Your next 10 appointments already scheduled     Jun 01, 2018  9:00 AM CDT   PHYSICAL with Ric Villela MD   [facility] ([facility])    [address] [address] [phone]              Future tests that were ordered for you today     Open Future Orders        Priority Expected Expires Ordered    Prostate spec antigen screen Routine 5/21/2018 5/21/2019 5/21/2018    Hemoglobin A1c Routine 5/21/2018 5/21/2019 5/21/2018    Comprehensive metabolic panel Routine 5/21/2018 5/21/2019 5/21/2018    Lipid Profile Routine 5/28/2018 5/21/2019 5/21/2018    CBC with platelets differential Routine 5/28/2018 5/21/2019 5/21/2018            Who to contact     If you have questions or need follow up information about today's clinic visit or your schedule please contact [facility] directly at [phone].  Normal or non-critical lab and imaging results will be communicated to you by  "MyChart, letter or phone within 4 business days after the clinic has received the results. If you do not hear from us within 7 days, please contact the clinic through [employer] or phone. If you have a critical or abnormal lab result, we will notify you by phone as soon as possible.  Submit refill requests through [employer] or call your pharmacy and they will forward the refill request to us. Please allow 3 business days for your refill to be completed.          Additional Information About Your Visit        [employer] Information     [employer] gives you secure access to your electronic health record. If you see a primary care provider, you can also send messages to your care team and make appointments. If you have questions, please call your primary care clinic.  If you do not have a primary care provider, please call [phone] and they will assist you.        Care EveryWhere ID     This is your Care EveryWhere ID. This could be used by other organizations to access your [facility] medical records  [id number]        Your Vitals Were     Pulse Height BMI (Body Mass Index)             60 1.803 m (5' 11\") 30.4 kg/m2          Blood Pressure from Last 3 Encounters:   05/21/18 128/80   07/12/17 138/76   09/15/16 128/73    Weight from Last 3 Encounters:   05/21/18 98.9 kg (218 lb)   07/12/17 100.7 kg (222 lb 1.6 oz)   09/15/16 100.7 kg (222 lb)                 Today's Medication Changes          These changes are accurate as of 5/21/18  9:20 AM.  If you have any questions, ask your nurse or doctor.               Start taking these medicines.        Dose/Directions    lisinopril 2.5 MG tablet   Commonly known as:  PRINIVIL/Zestril   Used for:  Benign essential hypertension   Started by:  Elizabeth Richardson APRN CNP        Dose:  2.5 mg   Take 1 tablet (2.5 mg) by mouth daily   Quantity:  30 tablet   Refills:  11            Where to get your medicines      These medications were sent to [id number] - [place], [place] - [address]  [address] [place] [address]     Phone:  [phone]     lisinopril 2.5 MG tablet                Primary Care Provider Office Phone # Fax #    Ric Villela [phone] [address]  [place] [address]        Equal Access to Services     [facility] : Hadii aad ku hadasho Soomaali, waaxda luqadaha, qaybta kaalmada adeegyada, waxay idiin hayaan adeeg kharash la'aan . So [facility] [phone].    ATENCIÓN: Si habla español, tiene a das disposición servicios gratuitos de asistencia lingüística. Llame al [phone].    We comply with applicable federal civil rights laws and [place] laws. We do not discriminate on the basis of race, color, national origin, age, disability, sex, sexual orientation, or gender identity.            Thank you!     Thank you for choosing [facility]  for your care. Our goal is always to provide you with excellent care. Hearing back from our patients is one way we can continue to improve our services. Please take a few minutes to complete the written survey that you may receive in the mail after your visit with us. Thank you!             Your Updated Medication List - Protect others around you: Learn how to safely use, store and throw away your medicines at www.disposemymeds.org.          This list is accurate as of 5/21/18  9:20 AM.  Always use your most recent med list.                   Brand Name Dispense Instructions for use Diagnosis    ASPIRIN PO      Take 325 mg by mouth.        atorvastatin 40 MG tablet    LIPITOR    90 tablet    Take 1 tablet (40 mg) by mouth daily    Hyperlipidemia LDL goal <70       ibuprofen 600 MG tablet    ADVIL/MOTRIN    30 tablet    Take 1 tablet (600 mg) by mouth every 6 hours as needed for moderate pain        lisinopril 2.5 MG tablet    PRINIVIL/Zestril    30 tablet    Take 1 tablet (2.5 mg) by mouth daily    Benign  essential hypertension       metoprolol succinate 50 MG 24 hr tablet    TOPROL-XL    90 tablet    Take 1 tablet (50 mg) by mouth daily    Coronary artery disease involving native coronary artery of native heart without angina pectoris       MULTI-VITAMIN PO      Take by mouth daily        nitroGLYcerin 0.4 MG sublingual tablet    NITROSTAT    25 tablet    Place 1 tablet (0.4 mg) under the tongue every 5 minutes as needed for chest pain    Coronary artery disease due to calcified coronary lesion       omeprazole 20 MG CR capsule    priLOSEC    90 capsule    Take 1 capsule (20 mg) by mouth daily    Gastroesophageal reflux disease without esophagitis       TYLENOL PO      Take 1,000 mg by mouth every 4 hours as needed for mild pain or fever        VITAMIN D PO      Take 1,000 mg by mouth daily

## 2018-05-21 NOTE — MR AVS SNAPSHOT
After Visit Summary   5/21/2018    Manan Bryant    MRN: [id number]           Patient Information     Date Of Birth          1955        Visit Information        Provider Department      5/21/2018 4:30 PM PEDERSON DCR2 [facility]        Today's Diagnoses     Syncope    -  1    Encounter for loop recorder check           Follow-ups after your visit        Your next 10 appointments already scheduled     May 21, 2018  4:30 PM CDT   Remote ICD Check with PEDERSON DCR2   [facility] ([facility])    [address] [address] [phone] OPT 2           This appointment is for a remote check of your debrillator.  This is not an appointment at the office.            Jun 01, 2018  9:00 AM CDT   PHYSICAL with Ric Villela MD   [facility] ([facility])    [address] [facility] [address]   [phone]            Aug 20, 2018  7:45 AM CDT   Return Visit with Abbe Corley MD   [facility] ([facility])    [address] [place] [id number] [phone] OPT 2            Sep 17, 2018  4:30 PM CDT   Remote ICD Check with PEDERSON DCR2   [facility] ([facility])    [address] [place] [id number] [phone] OPT 2           This appointment is for a remote check of your debrillator.  This is not an appointment at the office.              Who to contact     If you have questions or need follow up information about today's clinic visit or your schedule please contact [facility] directly at [phone].  Normal or non-critical lab and imaging results will be communicated to you by MyChart, letter or phone within 4 business days after the clinic has received the results. If you do not hear from  us within 7 days, please contact the clinic through Magnetic or phone. If you have a critical or abnormal lab result, we will notify you by phone as soon as possible.  Submit refill requests through Magnetic or call your pharmacy and they will forward the refill request to us. Please allow 3 business days for your refill to be completed.          Additional Information About Your Visit        [employer] Information     Magnetic gives you secure access to your electronic health record. If you see a primary care provider, you can also send messages to your care team and make appointments. If you have questions, please call your primary care clinic.  If you do not have a primary care provider, please call [phone] and they will assist you.        Care EveryWhere ID     This is your Care EveryWhere ID. This could be used by other organizations to access your [place] medical records  [id number]         Blood Pressure from Last 3 Encounters:   05/21/18 128/80   07/12/17 138/76   09/15/16 128/73    Weight from Last 3 Encounters:   05/21/18 98.9 kg (218 lb)   07/12/17 100.7 kg (222 lb 1.6 oz)   09/15/16 100.7 kg (222 lb)              We Performed the Following     C INTERROGATION DEVICE KAN MEDEROS ([id number])          Today's Medication Changes          These changes are accurate as of 5/21/18 11:22 AM.  If you have any questions, ask your nurse or doctor.               Start taking these medicines.        Dose/Directions    lisinopril 2.5 MG tablet   Commonly known as:  PRINIVIL/Zestril   Used for:  Benign essential hypertension   Started by:  Elizabeth Richardson APRN CNP        Dose:  2.5 mg   Take 1 tablet (2.5 mg) by mouth daily   Quantity:  30 tablet   Refills:  11            Where to get your medicines      These medications were sent to [id number] - [place] - [address] AT [facility]  [address], [place] [address]     Phone:  [phone]     lisinopril 2.5 MG  tablet                Primary Care Provider Office Phone # Fax #    Ric Villela [phone] [address] [address]        Equal Access to Services     ANAYELI ERIC : Hadgilmar rena ku hadmacoo Sonancyali, waaxda luqadaha, qaybta kaalmada adeegyada, lorri tolliverbasil quigleydanielle stanleymartha dillon. So [facility] [phone].    ATENCIÓN: Si habla español, tiene a das disposición servicios gratuitos de asistencia lingüística. Llame al [phone].    We comply with applicable federal civil rights laws and [place] laws. We do not discriminate on the basis of race, color, national origin, age, disability, sex, sexual orientation, or gender identity.            Thank you!     Thank you for choosing [facility]  for your care. Our goal is always to provide you with excellent care. Hearing back from our patients is one way we can continue to improve our services. Please take a few minutes to complete the written survey that you may receive in the mail after your visit with us. Thank you!             Your Updated Medication List - Protect others around you: Learn how to safely use, store and throw away your medicines at www.disposemymeds.org.          This list is accurate as of 5/21/18 11:22 AM.  Always use your most recent med list.                   Brand Name Dispense Instructions for use Diagnosis    ASPIRIN PO      Take 325 mg by mouth.        atorvastatin 40 MG tablet    LIPITOR    90 tablet    Take 1 tablet (40 mg) by mouth daily    Hyperlipidemia LDL goal <70       ibuprofen 600 MG tablet    ADVIL/MOTRIN    30 tablet    Take 1 tablet (600 mg) by mouth every 6 hours as needed for moderate pain        lisinopril 2.5 MG tablet    PRINIVIL/Zestril    30 tablet    Take 1 tablet (2.5 mg) by mouth daily    Benign essential hypertension       metoprolol succinate 50 MG 24 hr tablet    TOPROL-XL    90 tablet    Take 1 tablet (50 mg) by mouth daily     Coronary artery disease involving native coronary artery of native heart without angina pectoris       MULTI-VITAMIN PO      Take by mouth daily        nitroGLYcerin 0.4 MG sublingual tablet    NITROSTAT    25 tablet    Place 1 tablet (0.4 mg) under the tongue every 5 minutes as needed for chest pain    Coronary artery disease due to calcified coronary lesion       omeprazole 20 MG CR capsule    priLOSEC    90 capsule    Take 1 capsule (20 mg) by mouth daily    Gastroesophageal reflux disease without esophagitis       TYLENOL PO      Take 1,000 mg by mouth every 4 hours as needed for mild pain or fever        VITAMIN D PO      Take 1,000 mg by mouth daily

## 2018-05-21 NOTE — PROGRESS NOTES
Medtronic Reveal Linq Loop Recorder   Symptom: 0 Tachy: 0 Pause: 0 Abraham: 0  AT: 0 AF: 0  Time in AT/AF: 0 %  Heart rate: not available Battery: ok  Careplan: next remote on 9/17. Letter sent. SP

## 2018-05-21 NOTE — PATIENT INSTRUCTIONS
Today's Recommendations    1. Decrease Lisinopril to 2.5 mg once a day  2. Continue monitor BP and symptoms  3. Lab work today  4. Continue all other medications without changes.  5. Please follow up with Dr. Corley in next availble.    Please send a [employer] message or call [phone] with questions or concerns.     Scheduling number [phone].

## 2018-05-21 NOTE — TELEPHONE ENCOUNTER
Pended labs requested by pt.  Routing to PCP to review -as requested CBC with diff.  Bernadette Marcial RN

## 2018-05-21 NOTE — LETTER
Manan Bryant    [address] [facility]  [place] [address]    May 21, 2018      Dear Manan Bryant,     Your transmission sent on 5/21 has been reviewed. It was determined the results are normal. No events were detected.     __X___ Your next scheduled date for you to send a transmission is on 9/17.     Please call Eduardo at [phone] to change your appointment if needed. You may call and leave a message for a device RN if you have any questions at [phone] and they will return your call. Device clinic hours: Monday - Friday  8:00am-4:00pm   Sincerely,   PURNIMA

## 2018-06-01 ENCOUNTER — OFFICE VISIT (OUTPATIENT)
Dept: FAMILY MEDICINE | Facility: CLINIC | Age: 63
End: 2018-06-01
Payer: COMMERCIAL

## 2018-06-01 VITALS
HEIGHT: 72 IN | WEIGHT: 214 LBS | SYSTOLIC BLOOD PRESSURE: 116 MMHG | TEMPERATURE: 97.8 F | DIASTOLIC BLOOD PRESSURE: 68 MMHG | RESPIRATION RATE: 16 BRPM | HEART RATE: 50 BPM | OXYGEN SATURATION: 98 % | BODY MASS INDEX: 28.99 KG/M2

## 2018-06-01 DIAGNOSIS — E78.5 HYPERLIPIDEMIA LDL GOAL <70: Chronic | ICD-10-CM

## 2018-06-01 DIAGNOSIS — M62.89 HAMSTRING TIGHTNESS: ICD-10-CM

## 2018-06-01 DIAGNOSIS — I47.10 SVT (SUPRAVENTRICULAR TACHYCARDIA) (H): ICD-10-CM

## 2018-06-01 DIAGNOSIS — I47.20 VT (VENTRICULAR TACHYCARDIA) (H): ICD-10-CM

## 2018-06-01 DIAGNOSIS — Z00.00 ROUTINE GENERAL MEDICAL EXAMINATION AT A HEALTH CARE FACILITY: Primary | ICD-10-CM

## 2018-06-01 DIAGNOSIS — I10 BENIGN ESSENTIAL HYPERTENSION: ICD-10-CM

## 2018-06-01 DIAGNOSIS — R73.01 IFG (IMPAIRED FASTING GLUCOSE): ICD-10-CM

## 2018-06-01 DIAGNOSIS — I25.10 CORONARY ARTERY DISEASE INVOLVING NATIVE CORONARY ARTERY OF NATIVE HEART WITHOUT ANGINA PECTORIS: Chronic | ICD-10-CM

## 2018-06-01 PROCEDURE — 99396 PREV VISIT EST AGE 40-64: CPT | Performed by: INTERNAL MEDICINE

## 2018-06-01 RX ORDER — LOSARTAN POTASSIUM 25 MG/1
25 TABLET ORAL DAILY
Qty: 30 TABLET | Refills: 11 | Status: SHIPPED | OUTPATIENT
Start: 2018-06-01 | End: 2018-08-20

## 2018-06-01 NOTE — PATIENT INSTRUCTIONS
Preventive Health Recommendations  Male Ages 50 - 64    Yearly exam:             See your health care provider every year in order to  o   Review health changes.   o   Discuss preventive care.    o   Review your medicines if your doctor has prescribed any.     Have a cholesterol test every 5 years, or more frequently if you are at risk for high cholesterol/heart disease.     Have a diabetes test (fasting glucose) every three years. If you are at risk for diabetes, you should have this test more often.     Have a colonoscopy at age 50, or have a yearly FIT test (stool test). These exams will check for colon cancer.      Talk with your health care provider about whether or not a prostate cancer screening test (PSA) is right for you.    You should be tested each year for STDs (sexually transmitted diseases), if you re at risk.     Shots: Get a flu shot each year. Get a tetanus shot every 10 years.     Nutrition:    Eat at least 5 servings of fruits and vegetables daily.     Eat whole-grain bread, whole-wheat pasta and brown rice instead of white grains and rice.     Talk to your provider about Calcium and Vitamin D.     Lifestyle    Exercise for at least 150 minutes a week (30 minutes a day, 5 days a week). This will help you control your weight and prevent disease.     Limit alcohol to one drink per day.     No smoking.     Wear sunscreen to prevent skin cancer.     See your dentist every six months for an exam and cleaning.     See your eye doctor every 1 to 2 years.    (I47.1) SVT (supraventricular tachycardia) (H)  (primary encounter diagnosis)  Comment: Continue beta blocker and follow up in cardiology   Plan:     (E78.5) Hyperlipidemia LDL goal <70  Comment: checked fasting lipid panel recently and continue staitn  Plan:     (R73.01) IFG (impaired fasting glucose)  Comment: last hemoglobin A1c was stable and continue to lose weight which will improve your blood pressure   Plan:     (I25.10) Coronary artery  disease involving native coronary artery of native heart without angina pectoris  Comment: Continue atorvastatin and aspirin as well as beta blocker   Plan:     (I47.2) VT (ventricular tachycardia) (H)  Comment: as above - follow up in cardiology   Plan:     (I10) Benign essential hypertension  Comment: given dry cough with lisinopril we will adjust medications to stop lisinopril and start losartan and follow up in 2-3 weeks for lab only - basic metabolic panel   Plan: losartan (COZAAR) 25 MG tablet          Shingrix vaccine is now available.  I would call your insurance to see if a shingles vaccine is covered and get this at your pharmacy    Left leg stiffness, concern for radiculopathy  Comment; We will refer to physical therapy and if not improving consider lumbar spine imaging

## 2018-06-01 NOTE — PROGRESS NOTES
SUBJECTIVE:   CC: Manan Bryant is an 62 year old male who presents for preventative health visit.     Healthy Habits:    Do you get at least three servings of calcium containing foods daily (dairy, green leafy vegetables, etc.)? yes and no, taking calcium and/or vitamin D supplement:     Amount of exercise or daily activities, outside of work: 2 day(s) per week    Problems taking medications regularly No    Medication side effects: lightheaded on standing    Have you had an eye exam in the past two years? yes    Do you see a dentist twice per year? 1-2    Do you have sleep apnea, excessive snoring or daytime drowsiness?no       Pinched nerve left leg?    Today's PHQ-2 Score:   PHQ-2 ( 1999 [employer]) 6/1/2018 5/13/2016   Q1: Little interest or pleasure in doing things 0 0   Q2: Feeling down, depressed or hopeless 0 0   PHQ-2 Score 0 0       Abuse: Current or Past(Physical, Sexual or Emotional)- No  Do you feel safe in your environment - Yes    Social History   Substance Use Topics     Smoking status: Never Smoker     Smokeless tobacco: Never Used     Alcohol use No      If you drink alcohol do you typically have >3 drinks per day or >7 drinks per week? No                      Last PSA:   PSA   Date Value Ref Range Status   05/21/2018 0.83 0 - 4 ug/L Final     Comment:     Assay Method:  Chemiluminescence using Siemens Vista analyzer       Reviewed orders with patient. Reviewed health maintenance and updated orders accordingly - Yes  Labs reviewed in EPIC    Reviewed and updated as needed this visit by clinical staff  Tobacco  Allergies  Meds         Reviewed and updated as needed this visit by Provider        Past Medical History:   Diagnosis Date     CAD (coronary artery disease)     cardiac cath 8/5/2010: [facility] to LAD     Family history of early CAD      History of acute anterior wall MI 2010 2010     History of colonic polyps      Hyperlipidemia LDL goal <70      Myocardial infarction      Paroxysmal  "supraventricular tachycardia (H)      PVC (premature ventricular contraction)     Hx ventricular tachycardia during cardiac rehab 2010     SVT (supraventricular tachycardia) (H)          IFG (impaired fasting glucose)   Last hemoglobin A1c was a bit higher, but lower than usual  Coronary artery disease involving native coronary artery of native heart without angina pectoris   Stable - no issues  VT (ventricular tachycardia) (H)   He has continued to control heart rate with metoprolol  Hypertension   Manan Bryant did have a spike in blood pressure about two weeks ago with reading 160/100 and headache.  He was recommended to start lisinopril 5 mg daily and did have improvement in his blood pressure, but some initial light-headedness.  This has dissipated. And blood pressure is excellent today.  He is having a side effect of dry cough.     ROS:  CONSTITUTIONAL: NEGATIVE for fever, chills, change in weight  INTEGUMENTARY/SKIN: NEGATIVE for worrisome rashes, moles or lesions  EYES: NEGATIVE for vision changes or irritation  ENT: NEGATIVE for ear, mouth and throat problems  RESP: NEGATIVE for significant cough or SOB  CV: NEGATIVE for chest pain, palpitations or peripheral edema  GI: NEGATIVE for nausea, abdominal pain, heartburn, or change in bowel habits   male: negative for dysuria, hematuria, decreased urinary stream, erectile dysfunction, urethral discharge  MUSCULOSKELETAL: feels a \"stiffness\" in the back of the left leg and comes from the pelvic bone -notes worse when sitting for long periods of time.  Feels like may have loosened as day goes on.  NEURO: NEGATIVE for weakness, dizzines   PSYCHIATRIC: NEGATIVE for changes in mood or affect    OBJECTIVE:   /68  Pulse 50  Temp 97.8  F (36.6  C) (Oral)  Resp 16  Ht 5' 11.5\" (1.816 m)  Wt 214 lb (97.1 kg)  SpO2 98%  BMI 29.43 kg/m2  EXAM:  GENERAL: healthy, alert and no distress  EYES: Eyes grossly normal to inspection, PERRL and conjunctivae and " sclerae normal  HENT: ear canals and TM's normal, nose and mouth without ulcers or lesions  NECK: no adenopathy, no asymmetry, masses, or scars and thyroid normal to palpation  RESP: lungs clear to auscultation - no rales, rhonchi or wheezes  CV: regular rate and rhythm, normal S1 S2, no S3 or S4, no murmur, click or rub, no peripheral edema and peripheral pulses strong  ABDOMEN: soft, nontender, no hepatosplenomegaly, no masses and bowel sounds normal  RECTAL: normal sphincter tone, no rectal masses, prostate normal size, smooth, nontender without nodules or masses  MS: no gross musculoskeletal defects noted, no edema  SKIN: no suspicious lesions or rashes  NEURO: Normal strength and tone, mentation intact and speech normal, normal leg strength and sensation bilaterally   PSYCH: mentation appears normal, affect normal/bright    ASSESSMENT/PLAN:     (I47.1) SVT (supraventricular tachycardia) (H)  (primary encounter diagnosis)  Comment: Continue beta blocker and follow up in cardiology   Plan:     (E78.5) Hyperlipidemia LDL goal <70  Comment: checked fasting lipid panel recently and continue staitn  Plan:     (R73.01) IFG (impaired fasting glucose)  Comment: last hemoglobin A1c was stable and continue to lose weight which will improve your blood pressure   Plan:     (I25.10) Coronary artery disease involving native coronary artery of native heart without angina pectoris  Comment: Continue atorvastatin and aspirin as well as beta blocker   Plan:     (I47.2) VT (ventricular tachycardia) (H)  Comment: as above - follow up in cardiology   Plan:     (I10) Benign essential hypertension  Comment: given dry cough with lisinopril we will adjust medications to stop lisinopril and start losartan and follow up in 2-3 weeks for lab only - basic metabolic panel   Plan: losartan (COZAAR) 25 MG tablet          Shingrix vaccine is now available.  I would call your insurance to see if a shingles vaccine is covered and get this at your  "pharmacy    Left leg stiffness, concern for radiculopathy  Comment; We will refer to physical therapy and if not improving consider lumbar spine imaging        COUNSELING:  Reviewed preventive health counseling, as reflected in patient instructions       reports that he has never smoked. He has never used smokeless tobacco.    Estimated body mass index is 29.43 kg/(m^2) as calculated from the following:    Height as of this encounter: 5' 11.5\" (1.816 m).    Weight as of this encounter: 214 lb (97.1 kg).       Counseling Resources:  ATP IV Guidelines  Pooled Cohorts Equation Calculator  FRAX Risk Assessment  ICSI Preventive Guidelines  Dietary Guidelines for Americans, 2010  USDA's MyPlate  ASA Prophylaxis  Lung CA Screening    Ric Villela MD, MD  [facility]  "

## 2018-06-01 NOTE — MR AVS SNAPSHOT
After Visit Summary   6/1/2018    Manan Bryant    MRN: [id number]           Patient Information     Date Of Birth          1955        Visit Information        Provider Department      6/1/2018 9:00 AM Ric Villela MD [facility]        Today's Diagnoses     SVT (supraventricular tachycardia) (H)    -  1    Hyperlipidemia LDL goal <70        IFG (impaired fasting glucose)        Coronary artery disease involving native coronary artery of native heart without angina pectoris        VT (ventricular tachycardia) (H)        Benign essential hypertension        Hamstring tightness          Care Instructions      Preventive Health Recommendations  Male Ages 50 - 64    Yearly exam:             See your health care provider every year in order to  o   Review health changes.   o   Discuss preventive care.    o   Review your medicines if your doctor has prescribed any.     Have a cholesterol test every 5 years, or more frequently if you are at risk for high cholesterol/heart disease.     Have a diabetes test (fasting glucose) every three years. If you are at risk for diabetes, you should have this test more often.     Have a colonoscopy at age 50, or have a yearly FIT test (stool test). These exams will check for colon cancer.      Talk with your health care provider about whether or not a prostate cancer screening test (PSA) is right for you.    You should be tested each year for STDs (sexually transmitted diseases), if you re at risk.     Shots: Get a flu shot each year. Get a tetanus shot every 10 years.     Nutrition:    Eat at least 5 servings of fruits and vegetables daily.     Eat whole-grain bread, whole-wheat pasta and brown rice instead of white grains and rice.     Talk to your provider about Calcium and Vitamin D.     Lifestyle    Exercise for at least 150 minutes a week (30 minutes a day, 5 days a week). This will help you control your weight and prevent disease.      Limit alcohol to one drink per day.     No smoking.     Wear sunscreen to prevent skin cancer.     See your dentist every six months for an exam and cleaning.     See your eye doctor every 1 to 2 years.    (I47.1) SVT (supraventricular tachycardia) (H)  (primary encounter diagnosis)  Comment: Continue beta blocker and follow up in cardiology   Plan:     (E78.5) Hyperlipidemia LDL goal <70  Comment: checked fasting lipid panel recently and continue staitn  Plan:     (R73.01) IFG (impaired fasting glucose)  Comment: last hemoglobin A1c was stable and continue to lose weight which will improve your blood pressure   Plan:     (I25.10) Coronary artery disease involving native coronary artery of native heart without angina pectoris  Comment: Continue atorvastatin and aspirin as well as beta blocker   Plan:     (I47.2) VT (ventricular tachycardia) (H)  Comment: as above - follow up in cardiology   Plan:     (I10) Benign essential hypertension  Comment: given dry cough with lisinopril we will adjust medications to stop lisinopril and start losartan and follow up in 2-3 weeks for lab only - basic metabolic panel   Plan: losartan (COZAAR) 25 MG tablet          Shingrix vaccine is now available.  I would call your insurance to see if a shingles vaccine is covered and get this at your pharmacy    Left leg stiffness, concern for radiculopathy  Comment; We will refer to physical therapy and if not improving consider lumbar spine imaging          Follow-ups after your visit        Additional Services     [facility] PT, HAND, AND CHIROPRACTIC REFERRAL       **This order will print in the [facility] Scheduling Office**    Physical Therapy, Hand Therapy and Chiropractic Care are available through:    *[facility]  *[facility]  *[facility]    Call one number to schedule at any of the above locations: [phone].    Your provider has referred you to: Physical Therapy at [facility] or  [facility]    Indication/Reason for Referral: Low Back Pain and left leg muscle tightness  Onset of Illness: chronic  Therapy Orders: Evaluate and Treat  Special Programs: None  Special Request: None    Hubert Gaspar      Additional Comments for the Therapist or Chiropractor:     Please be aware that coverage of these services is subject to the terms and limitations of your health insurance plan.  Call member services at your health plan with any benefit or coverage questions.      Please bring the following to your appointment:    *Your personal calendar for scheduling future appointments  *Comfortable clothing                  Your next 10 appointments already scheduled     Aug 20, 2018  7:45 AM CDT   Return Visit with Abbe Corley MD   [facility] ([facility])    [address]  [facility] [id number] [phone] OPT 2            Sep 17, 2018  4:30 PM CDT   Remote ICD Check with PEDERSON DCR2   [facility] ([facility])    [id number]  [facility] [id number] [phone] OPT 2           This appointment is for a remote check of your debrillator.  This is not an appointment at the office.              Future tests that were ordered for you today     Open Future Orders        Priority Expected Expires Ordered    Basic metabolic panel Routine  11/28/2018 6/1/2018            Who to contact     If you have questions or need follow up information about today's clinic visit or your schedule please contact [facility] directly at [phone].  Normal or non-critical lab and imaging results will be communicated to you by MyChart, letter or phone within 4 business days after the clinic has received the results. If you do not hear from us within 7 days, please contact the clinic through MyChart or phone. If you have a critical or abnormal lab result, we will notify you by phone as soon as  "possible.  Submit refill requests through [employer] or call your pharmacy and they will forward the refill request to us. Please allow 3 business days for your refill to be completed.          Additional Information About Your Visit        [employer] Information     [employer] gives you secure access to your electronic health record. If you see a primary care provider, you can also send messages to your care team and make appointments. If you have questions, please call your primary care clinic.  If you do not have a primary care provider, please call [phone] and they will assist you.        Care EveryWhere ID     This is your Care EveryWhere ID. This could be used by other organizations to access your [facility] medical records  [id number]        Your Vitals Were     Pulse Temperature Respirations Height Pulse Oximetry BMI (Body Mass Index)    50 97.8  F (36.6  C) (Oral) 16 5' 11.5\" (1.816 m) 98% 29.43 kg/m2       Blood Pressure from Last 3 Encounters:   06/01/18 116/68   05/21/18 128/80   07/12/17 138/76    Weight from Last 3 Encounters:   06/01/18 214 lb (97.1 kg)   05/21/18 218 lb (98.9 kg)   07/12/17 222 lb 1.6 oz (100.7 kg)              We Performed the Following     [facility] PT, HAND, AND CHIROPRACTIC REFERRAL          Today's Medication Changes          These changes are accurate as of 6/1/18  9:37 AM.  If you have any questions, ask your nurse or doctor.               Start taking these medicines.        Dose/Directions    losartan 25 MG tablet   Commonly known as:  COZAAR   Used for:  Benign essential hypertension   Started by:  Ric Villela MD        Dose:  25 mg   Take 1 tablet (25 mg) by mouth daily   Quantity:  30 tablet   Refills:  11         Stop taking these medicines if you haven't already. Please contact your care team if you have questions.     lisinopril 2.5 MG tablet   Commonly known as:  PRINIVIL/Zestril   Stopped by:  Ric Villela MD                Where to get your medicines "      These medications were sent to [id number] - [place], [place] - [address]  [address] [place] [address]     Phone:  [phone]     losartan 25 MG tablet                Primary Care Provider Office Phone # Fax #    Ric Villela [phone] [address]  [place] [address]        Equal Access to Services     [facility] : Hadii aad ku hadasho Soomaali, waaxda luqadaha, qaybta kaalmada adeegyada, waxay idiin hayaan adeeg kharash la'aabasil . So [facility] [phone].    ATENCIÓN: Si habla español, tiene a das disposición servicios gratuitos de asistencia lingüística. [facility] [phone].    We comply with applicable federal civil rights laws and [place] laws. We do not discriminate on the basis of race, color, national origin, age, disability, sex, sexual orientation, or gender identity.            Thank you!     Thank you for choosing [facility]  for your care. Our goal is always to provide you with excellent care. Hearing back from our patients is one way we can continue to improve our services. Please take a few minutes to complete the written survey that you may receive in the mail after your visit with us. Thank you!             Your Updated Medication List - Protect others around you: Learn how to safely use, store and throw away your medicines at www.disposemymeds.org.          This list is accurate as of 6/1/18  9:37 AM.  Always use your most recent med list.                   Brand Name Dispense Instructions for use Diagnosis    ASPIRIN PO      Take 325 mg by mouth.        atorvastatin 40 MG tablet    LIPITOR    90 tablet    Take 1 tablet (40 mg) by mouth daily    Hyperlipidemia LDL goal <70       ibuprofen 600 MG tablet    ADVIL/MOTRIN    30 tablet    Take 1 tablet (600 mg) by mouth every 6 hours as needed for moderate pain        losartan 25 MG tablet    COZAAR    30 tablet    Take 1  tablet (25 mg) by mouth daily    Benign essential hypertension       metoprolol succinate 50 MG 24 hr tablet    TOPROL-XL    90 tablet    Take 1 tablet (50 mg) by mouth daily    Coronary artery disease involving native coronary artery of native heart without angina pectoris       MULTI-VITAMIN PO      Take by mouth daily        nitroGLYcerin 0.4 MG sublingual tablet    NITROSTAT    25 tablet    Place 1 tablet (0.4 mg) under the tongue every 5 minutes as needed for chest pain    Coronary artery disease due to calcified coronary lesion       omeprazole 20 MG CR capsule    priLOSEC    90 capsule    Take 1 capsule (20 mg) by mouth daily    Gastroesophageal reflux disease without esophagitis       TYLENOL PO      Take 1,000 mg by mouth every 4 hours as needed for mild pain or fever        VITAMIN D PO      Take 1,000 mg by mouth daily

## 2018-06-14 ENCOUNTER — THERAPY VISIT (OUTPATIENT)
Dept: PHYSICAL THERAPY | Facility: CLINIC | Age: 63
End: 2018-06-14
Attending: INTERNAL MEDICINE
Payer: COMMERCIAL

## 2018-06-14 DIAGNOSIS — M25.552 HIP PAIN, LEFT: Primary | ICD-10-CM

## 2018-06-14 PROCEDURE — 97161 PT EVAL LOW COMPLEX 20 MIN: CPT | Mod: GP | Performed by: PHYSICAL THERAPIST

## 2018-06-14 PROCEDURE — 97110 THERAPEUTIC EXERCISES: CPT | Mod: GP | Performed by: PHYSICAL THERAPIST

## 2018-06-14 NOTE — MR AVS SNAPSHOT
After Visit Summary   6/14/2018    Manan Bryant    MRN: [id number]           Patient Information     Date Of Birth          1955        Visit Information        Provider Department      6/14/2018 4:00 PM Ric Nair PT [facility]        Today's Diagnoses     Hip pain, left    -  1       Follow-ups after your visit        Your next 10 appointments already scheduled     Jun 21, 2018  4:40 PM CDT   [facility] Spine with Ric Nair PT   [facility] ([facility]  )    [address]  [address]  [place] [address]   [phone]            Aug 20, 2018  7:45 AM CDT   Return Visit with Abbe Corley MD   [facility] ([facility])    [address] [place] [address] [address]  [facility] [id number] [phone] OPT 2            Sep 17, 2018  4:30 PM CDT   Remote ICD Check with PEDERSON DCR2   [facility] ([facility])    [address] [place] [address] [address]  [facility] [id number] [phone] OPT 2           This appointment is for a remote check of your debrillator.  This is not an appointment at the office.              Who to contact     If you have questions or need follow up information about today's clinic visit or your schedule please contact [facility] directly at [phone].  Normal or non-critical lab and imaging results will be communicated to you by MyChart, letter or phone within 4 business days after the clinic has received the results. If you do not hear from us within 7 days, please contact the clinic through MyChart or phone. If you have a critical or abnormal lab result, we will notify you by phone as soon as possible.  Submit refill requests through [employer] or call your pharmacy and they will forward the refill request to us. Please allow 3 business days for your refill to be completed.          Additional Information  About Your Visit        [employer] Information     [employer] gives you secure access to your electronic health record. If you see a primary care provider, you can also send messages to your care team and make appointments. If you have questions, please call your primary care clinic.  If you do not have a primary care provider, please call [phone] and they will assist you.        Care EveryWhere ID     This is your Care EveryWhere ID. This could be used by other organizations to access your [facility] medical records  [id number]         Blood Pressure from Last 3 Encounters:   06/01/18 116/68   05/21/18 128/80   07/12/17 138/76    Weight from Last 3 Encounters:   06/01/18 97.1 kg (214 lb)   05/21/18 98.9 kg (218 lb)   07/12/17 100.7 kg (222 lb 1.6 oz)              We Performed the Following     HC PT EVAL, LOW COMPLEXITY     [facility] INITIAL EVAL REPORT     THERAPEUTIC EXERCISES        Primary Care Provider Office Phone # Fax #    Ric Villela [phone] [address]  [place] [address]        Equal Access to Services     [facility] : Hadii aad ku hadasho Sosebas, waaxda luqadaha, qaybta kaalmada lane, lorri quiles . So [facility] [phone].    ATENCIÓN: Si habla español, tiene a das disposición servicios gratciarraos de asistencia lingüística. [facility] [phone].    We comply with applicable federal civil rights laws and [place] laws. We do not discriminate on the basis of race, color, national origin, age, disability, sex, sexual orientation, or gender identity.            Thank you!     Thank you for choosing [facility] [place]  for your care. Our goal is always to provide you with excellent care. Hearing back from our patients is one way we can continue to improve our services. Please take a few minutes to complete the written survey that you may receive in the mail after your visit with us. Thank you!             Your  Updated Medication List - Protect others around you: Learn how to safely use, store and throw away your medicines at www.disposemymeds.org.          This list is accurate as of 6/14/18  4:59 PM.  Always use your most recent med list.                   Brand Name Dispense Instructions for use Diagnosis    ASPIRIN PO      Take 325 mg by mouth.        atorvastatin 40 MG tablet    LIPITOR    90 tablet    Take 1 tablet (40 mg) by mouth daily    Hyperlipidemia LDL goal <70       ibuprofen 600 MG tablet    ADVIL/MOTRIN    30 tablet    Take 1 tablet (600 mg) by mouth every 6 hours as needed for moderate pain        losartan 25 MG tablet    COZAAR    30 tablet    Take 1 tablet (25 mg) by mouth daily    Benign essential hypertension       metoprolol succinate 50 MG 24 hr tablet    TOPROL-XL    90 tablet    Take 1 tablet (50 mg) by mouth daily    Coronary artery disease involving native coronary artery of native heart without angina pectoris       MULTI-VITAMIN PO      Take by mouth daily        nitroGLYcerin 0.4 MG sublingual tablet    NITROSTAT    25 tablet    Place 1 tablet (0.4 mg) under the tongue every 5 minutes as needed for chest pain    Coronary artery disease due to calcified coronary lesion       omeprazole 20 MG CR capsule    priLOSEC    90 capsule    Take 1 capsule (20 mg) by mouth daily    Gastroesophageal reflux disease without esophagitis       TYLENOL PO      Take 1,000 mg by mouth every 4 hours as needed for mild pain or fever        VITAMIN D PO      Take 1,000 mg by mouth daily

## 2018-06-14 NOTE — PROGRESS NOTES
[facility] Initial Evaluation  Subjective:  Patient is a 62 year old male presenting with rehab left hip hpi.   Manan Bryant is a 62 year old male with a left hip (low back) condition.  Condition occurred with:  Insidious onset.  Condition occurred: for unknown reasons.  This is a chronic condition  Patient reports that he started to have left gluteus pain for about 20 years.  Recently worse since about Feb 2018.    No injury.   Pain is from the left gluteus.  Sometimes to the knee.     1.5 years ago had very severe symtpoms (spasms that needed to go to the ER for). .        Pain is described as aching and is intermittent and reported as 1/10.   Pain is worse in the A.M..  Symptoms are exacerbated by sitting Relieved by: walking.  Since onset symptoms are unchanged.        General health as reported by patient is good.  Pertinent medical history includes:  Heart problems, high blood pressure, implanted device, osteoarthritis and overweight.    Other surgeries include:  Heart surgery (stent).  Current medications:  Anti-inflammatory and cardiac medication.  Current occupation is Executive.  Patient is currently not working due to present treatment problem.  Primary job tasks include:  Prolonged sitting.    Barriers include:  None as reported by the patient.    Red flags:  None as reported by the patient.                        Objective:        Flexibility/Screens:       Lower Extremity:  Decreased left lower extremity flexibility:Piriformis; Hip Flexors; IT Band; Quadriceps and Hamstrings    Decreased right lower extremity flexibility:  Piriformis; Hip Flexors; IT Band; Quadriceps and Hamstrings               Lumbar/SI Evaluation  ROM:    AROM Lumbar:   Flexion:        Min restriction  Ext:                    Min restriction   Side Bend:        Left:     Right:   Rotation:           Left:     Right:   Side Glide:        Left:     Right:                                                                Hip Evaluation  HIP AROM:  AROM:    Left Hip:     Normal    Right Hip:   Normal                  Hip PROM:  Hip PROM:  Left Hip:    Normal  Right Hip:  Normal                          Hip Strength:    Flexion:   Left: 4+/5   Pain:  Right: 5/5   Pain:                    Extension:  Left: 4-/5  Pain:Right: 4+/5    Pain:    Abduction:  Left: 4-/5     Pain:Right: 4+/5    Pain:  Adduction:  Left: 5/5    Pain:Right: 5/5   Pain:  Internal Rotation:  Left: 5/5    Pain:Right: 5/5   Pain:  External Rotation:  Left: 5/5   Pain:  Right: 5/5   Pain:                           General     ROS    Assessment/Plan:    Patient is a 62 year old male with left side hip complaints.    Patient has the following significant findings with corresponding treatment plan.                Diagnosis 1:  Left post hip pain  Pain -  US, manual therapy, self management, education, directional preference exercise and home program  Decreased ROM/flexibility - manual therapy, therapeutic exercise and home program  Decreased strength - therapeutic exercise, therapeutic activities and home program  Impaired muscle performance - neuro re-education and home program  Decreased function - therapeutic activities and home program    Therapy Evaluation Codes:   1) History comprised of:   Personal factors that impact the plan of care:      Time since onset of symptoms.    Comorbidity factors that impact the plan of care are:      High blood pressure and Overweight.     Medications impacting care: Cardiac and High blood pressure.  2) Examination of Body Systems comprised of:   Body structures and functions that impact the plan of care:      Hip and Lumbar spine.   Activity limitations that impact the plan of care are:      Sitting.  3) Clinical presentation characteristics are:   Stable/Uncomplicated.  4) Decision-Making    Low complexity using standardized patient assessment instrument and/or measureable assessment of functional outcome.  Cumulative  Therapy Evaluation is: Low complexity.    Previous and current functional limitations:  (See Goal Flow Sheet for this information)    Short term and Long term goals: (See Goal Flow Sheet for this information)     Communication ability:  Patient appears to be able to clearly communicate and understand verbal and written communication and follow directions correctly.  Treatment Explanation - The following has been discussed with the patient:   RX ordered/plan of care  Anticipated outcomes  Possible risks and side effects  This patient would benefit from PT intervention to resume normal activities.   Rehab potential is excellent.    Frequency:  1 X week, once daily  Duration:  for 6 weeks  Discharge Plan:  Achieve all LTG.  Independent in home treatment program.  Reach maximal therapeutic benefit.    Please refer to the daily flowsheet for treatment today, total treatment time and time spent performing 1:1 timed codes.

## 2018-07-25 DIAGNOSIS — E78.5 HYPERLIPIDEMIA LDL GOAL <70: ICD-10-CM

## 2018-07-25 DIAGNOSIS — K21.9 GASTROESOPHAGEAL REFLUX DISEASE WITHOUT ESOPHAGITIS: ICD-10-CM

## 2018-07-25 RX ORDER — ATORVASTATIN CALCIUM 40 MG/1
40 TABLET, FILM COATED ORAL DAILY
Qty: 90 TABLET | Refills: 1 | Status: SHIPPED | OUTPATIENT
Start: 2018-07-25 | End: 2018-08-20

## 2018-08-01 ENCOUNTER — PRE VISIT (OUTPATIENT)
Dept: CARDIOLOGY | Facility: CLINIC | Age: 63
End: 2018-08-01

## 2018-08-01 DIAGNOSIS — E78.2 MIXED HYPERLIPIDEMIA: ICD-10-CM

## 2018-08-01 DIAGNOSIS — I47.10 PAROXYSMAL SUPRAVENTRICULAR TACHYCARDIA (H): ICD-10-CM

## 2018-08-20 ENCOUNTER — OFFICE VISIT (OUTPATIENT)
Dept: CARDIOLOGY | Facility: CLINIC | Age: 63
End: 2018-08-20
Payer: COMMERCIAL

## 2018-08-20 VITALS
BODY MASS INDEX: 28.88 KG/M2 | SYSTOLIC BLOOD PRESSURE: 113 MMHG | WEIGHT: 206.3 LBS | DIASTOLIC BLOOD PRESSURE: 73 MMHG | HEIGHT: 71 IN | HEART RATE: 60 BPM

## 2018-08-20 DIAGNOSIS — I10 BENIGN ESSENTIAL HYPERTENSION: ICD-10-CM

## 2018-08-20 DIAGNOSIS — E78.5 HYPERLIPIDEMIA LDL GOAL <70: Chronic | ICD-10-CM

## 2018-08-20 DIAGNOSIS — I49.3 PVC (PREMATURE VENTRICULAR CONTRACTION): ICD-10-CM

## 2018-08-20 DIAGNOSIS — I47.20 VT (VENTRICULAR TACHYCARDIA) (H): ICD-10-CM

## 2018-08-20 DIAGNOSIS — I25.10 CORONARY ARTERY DISEASE INVOLVING NATIVE CORONARY ARTERY OF NATIVE HEART WITHOUT ANGINA PECTORIS: Primary | Chronic | ICD-10-CM

## 2018-08-20 DIAGNOSIS — I47.10 SVT (SUPRAVENTRICULAR TACHYCARDIA) (H): ICD-10-CM

## 2018-08-20 DIAGNOSIS — E78.6 HDL DEFICIENCY: Chronic | ICD-10-CM

## 2018-08-20 PROCEDURE — 99214 OFFICE O/P EST MOD 30 MIN: CPT | Performed by: INTERNAL MEDICINE

## 2018-08-20 RX ORDER — LOSARTAN POTASSIUM 25 MG/1
25 TABLET ORAL DAILY
Qty: 90 TABLET | Refills: 3 | Status: SHIPPED | OUTPATIENT
Start: 2018-08-20 | End: 2019-06-01

## 2018-08-20 RX ORDER — ATORVASTATIN CALCIUM 40 MG/1
40 TABLET, FILM COATED ORAL DAILY
Qty: 90 TABLET | Refills: 3 | Status: SHIPPED | OUTPATIENT
Start: 2018-08-20 | End: 2018-12-18 | Stop reason: ALTCHOICE

## 2018-08-20 RX ORDER — METOPROLOL SUCCINATE 50 MG/1
50 TABLET, EXTENDED RELEASE ORAL DAILY
Qty: 90 TABLET | Refills: 3 | Status: SHIPPED | OUTPATIENT
Start: 2018-08-20 | End: 2019-07-12

## 2018-08-20 NOTE — PROGRESS NOTES
HPI and Plan:   See dictation    No orders of the defined types were placed in this encounter.      Orders Placed This Encounter   Medications     metoprolol succinate (TOPROL-XL) 50 MG 24 hr tablet     Sig: Take 1 tablet (50 mg) by mouth daily     Dispense:  90 tablet     Refill:  3     losartan (COZAAR) 25 MG tablet     Sig: Take 1 tablet (25 mg) by mouth daily     Dispense:  90 tablet     Refill:  3     atorvastatin (LIPITOR) 40 MG tablet     Sig: Take 1 tablet (40 mg) by mouth daily     Dispense:  90 tablet     Refill:  3       Medications Discontinued During This Encounter   Medication Reason     metoprolol (TOPROL-XL) 50 MG 24 hr tablet Reorder     losartan (COZAAR) 25 MG tablet Reorder     atorvastatin (LIPITOR) 40 MG tablet Reorder         Encounter Diagnoses   Name Primary?     Coronary artery disease involving native coronary artery of native heart without angina pectoris Yes     Hyperlipidemia LDL goal <70      PVC (premature ventricular contraction)      Benign essential hypertension      SVT (supraventricular tachycardia) (H)      VT (ventricular tachycardia) (H)      HDL deficiency        CURRENT MEDICATIONS:  Current Outpatient Prescriptions   Medication Sig Dispense Refill     Acetaminophen (TYLENOL PO) Take 1,000 mg by mouth every 4 hours as needed for mild pain or fever       ASPIRIN PO Take 325 mg by mouth daily        atorvastatin (LIPITOR) 40 MG tablet Take 1 tablet (40 mg) by mouth daily 90 tablet 3     Cholecalciferol (VITAMIN D PO) Take 1,000 mg by mouth daily        ibuprofen (ADVIL,MOTRIN) 600 MG tablet Take 1 tablet (600 mg) by mouth every 6 hours as needed for moderate pain 30 tablet 1     losartan (COZAAR) 25 MG tablet Take 1 tablet (25 mg) by mouth daily 90 tablet 3     metoprolol succinate (TOPROL-XL) 50 MG 24 hr tablet Take 1 tablet (50 mg) by mouth daily 90 tablet 3     Multiple Vitamin (MULTI-VITAMIN PO) Take by mouth daily        nitroGLYcerin (NITROSTAT) 0.4 MG sublingual tablet  Place 1 tablet (0.4 mg) under the tongue every 5 minutes as needed for chest pain 25 tablet 3     omeprazole (PRILOSEC) 20 MG CR capsule Take 1 capsule (20 mg) by mouth daily 90 capsule 0     [DISCONTINUED] atorvastatin (LIPITOR) 40 MG tablet Take 1 tablet (40 mg) by mouth daily 90 tablet 1     [DISCONTINUED] losartan (COZAAR) 25 MG tablet Take 1 tablet (25 mg) by mouth daily 30 tablet 11     [DISCONTINUED] metoprolol (TOPROL-XL) 50 MG 24 hr tablet Take 1 tablet (50 mg) by mouth daily 90 tablet 3       ALLERGIES   No Known Allergies    PAST MEDICAL HISTORY:  Past Medical History:   Diagnosis Date     CAD (coronary artery disease)     cardiac cath 8/5/2010: ELLIOTT to LAD     Family history of early CAD      History of acute anterior wall MI 2010 2010     History of colonic polyps      Mixed hyperlipidemia      Myocardial infarction      Paroxysmal supraventricular tachycardia (H)      PVC (premature ventricular contraction)     Hx ventricular tachycardia during cardiac rehab 2010     SVT (supraventricular tachycardia) (H)        PAST SURGICAL HISTORY:  Past Surgical History:   Procedure Laterality Date     STENT, CORONARY, EMEKA  8/2010    LAD       FAMILY HISTORY:  Family History   Problem Relation Age of Onset     Coronary Artery Disease Father      Myocardial Infarction Father      Coronary Artery Disease Brother      Myocardial Infarction Brother      Heart Surgery Brother      bypass     Coronary Artery Disease Paternal Grandfather      Myocardial Infarction Paternal Grandfather      Sleep Apnea Sister      Family History Negative Mother      Family History Negative Maternal Grandmother      Heart Failure Maternal Grandfather      Family History Negative Paternal Grandmother      Myocardial Infarction Brother      Coronary Artery Disease Brother      Heart Surgery Brother      bypass       SOCIAL HISTORY:  Social History     Social History     Marital status:      Spouse name: N/A     Number of children:  "N/A     Years of education: N/A     Social History Main Topics     Smoking status: Never Smoker     Smokeless tobacco: Never Used     Alcohol use No     Drug use: No     Sexual activity: No     Other Topics Concern     Caffeine Concern No     decaf: 1-2 cups a day. Diek coke: 4-5 cans a day     Sleep Concern No     Stress Concern No     Weight Concern No     Special Diet Yes     limited, heart healthy     Exercise Yes     bike outside, 1-3x a week     Bike Helmet Yes     Seat Belt Yes     Social History Narrative       Review of Systems:  Skin:  Negative       Eyes:  Positive for glasses    ENT:  Negative      Respiratory:  Negative       Cardiovascular:  Negative;chest pain;palpitations;syncope or near-syncope;cyanosis;fatigue;edema;exercise intolerance Positive for;lightheadedness    Gastroenterology: Negative      Genitourinary:  Negative      Musculoskeletal:  Positive for joint pain;arthritis    Neurologic:  Negative      Psychiatric:  Negative      Heme/Lymph/Imm:  Negative      Endocrine:  Positive for   states has elevated liver test, recent US abdomen    Physical Exam:  Vitals: /73 (BP Location: Left arm, Cuff Size: Adult Large)  Pulse 60  Ht 1.803 m (5' 11\")  Wt 93.6 kg (206 lb 4.8 oz)  BMI 28.77 kg/m2    Constitutional:  cooperative, alert and oriented, well developed, well nourished, in no acute distress        Skin:  warm and dry to the touch, no apparent skin lesions or masses noted          Head:  normocephalic, no masses or lesions        Eyes:  pupils equal and round, conjunctivae and lids unremarkable, sclera white, no xanthalasma, EOMS intact, no nystagmus        Lymph:      ENT:  no pallor or cyanosis, dentition good        Neck:  carotid pulses are full and equal bilaterally;no carotid bruit        Respiratory:  normal breath sounds, clear to auscultation, normal A-P diameter, normal symmetry, normal respiratory excursion, no use of accessory muscles         Cardiac: regular " rhythm;normal S1 and S2;no S3 or S4;no murmurs, gallops or rubs detected                pulses full and equal                                        GI:  not assessed this visit        Extremities and Muscular Skeletal:  no edema;no spinal abnormalities noted;normal muscle strength and tone              Neurological:  no gross motor deficits        Psych:  affect appropriate, oriented to time, person and place        CC  No referring provider defined for this encounter.

## 2018-08-20 NOTE — MR AVS SNAPSHOT
After Visit Summary   8/20/2018    Manan Bryant    MRN: [id number]           Patient Information     Date Of Birth          1955        Visit Information        Provider Department      8/20/2018 7:45 AM Abbe Corley MD [facility]        Today's Diagnoses     Coronary artery disease involving native coronary artery of native heart without angina pectoris    -  1    Hyperlipidemia LDL goal <70        PVC (premature ventricular contraction)        Benign essential hypertension        SVT (supraventricular tachycardia) (H)        VT (ventricular tachycardia) (H)        HDL deficiency           Follow-ups after your visit        Additional Services     Follow-Up with Cardiac Advanced Practice Provider           Follow-Up with Cardiologist       BMP/FLP/ALT                  Your next 10 appointments already scheduled     Sep 17, 2018  4:30 PM CDT   Remote ICD Check with PEDERSON DCR2   [facility] ([facility])    [address] [place] [id number] [phone] OPT 2           This appointment is for a remote check of your debrillator.  This is not an appointment at the office.              Future tests that were ordered for you today     Open Future Orders        Priority Expected Expires Ordered    ALT Routine 8/20/2019 8/20/2019 8/20/2018    Basic metabolic panel Routine 8/20/2019 8/21/2019 8/20/2018    Follow-Up with Cardiac Advanced Practice Provider Routine 8/20/2019 8/21/2019 8/20/2018    Follow-Up with Cardiologist Routine 8/19/2020 9/8/2020 8/20/2018    Lipid Profile Routine 8/20/2019 8/20/2019 8/20/2018            Who to contact     If you have questions or need follow up information about today's clinic visit or your schedule please contact [facility] directly at [phone].  Normal or non-critical lab and imaging results will be communicated  "to you by MyChart, letter or phone within 4 business days after the clinic has received the results. If you do not hear from us within 7 days, please contact the clinic through [employer] or phone. If you have a critical or abnormal lab result, we will notify you by phone as soon as possible.  Submit refill requests through [employer] or call your pharmacy and they will forward the refill request to us. Please allow 3 business days for your refill to be completed.          Additional Information About Your Visit        [employer] Information     [facility] gives you secure access to your electronic health record. If you see a primary care provider, you can also send messages to your care team and make appointments. If you have questions, please call your primary care clinic.  If you do not have a primary care provider, please call [phone] and they will assist you.        Care EveryWhere ID     This is your Care EveryWhere ID. This could be used by other organizations to access your [facility] medical records  [id number]        Your Vitals Were     Pulse Height BMI (Body Mass Index)             60 1.803 m (5' 11\") 28.77 kg/m2          Blood Pressure from Last 3 Encounters:   08/20/18 113/73   06/01/18 116/68   05/21/18 128/80    Weight from Last 3 Encounters:   08/20/18 93.6 kg (206 lb 4.8 oz)   06/01/18 97.1 kg (214 lb)   05/21/18 98.9 kg (218 lb)                 Where to get your medicines      These medications were sent to [id number]  [address] [facility] - [address] AT [facility]  [address], [place] [address]     Phone:  [phone]     atorvastatin 40 MG tablet    losartan 25 MG tablet    metoprolol succinate 50 MG 24 hr tablet          Primary Care Provider Office Phone # Fax #    Ric Villela [phone] [address]  [place] [id number]        Equal Access to Services     ANAYELI ERIC AH: Hadii aad rae Bernstein " demetrius ocampojacquesalbaro quigleylorri lucero. So [facility] [phone].    ATENCIÓN: Si eddy cotton, tiene a das disposición servicios gratuitos de asistencia lingüística. Sandra al [phone].    We comply with applicable federal civil rights laws and [place] laws. We do not discriminate on the basis of race, color, national origin, age, disability, sex, sexual orientation, or gender identity.            Thank you!     Thank you for choosing [facility]  for your care. Our goal is always to provide you with excellent care. Hearing back from our patients is one way we can continue to improve our services. Please take a few minutes to complete the written survey that you may receive in the mail after your visit with us. Thank you!             Your Updated Medication List - Protect others around you: Learn how to safely use, store and throw away your medicines at www.disposemymeds.org.          This list is accurate as of 8/20/18  8:22 AM.  Always use your most recent med list.                   Brand Name Dispense Instructions for use Diagnosis    ASPIRIN PO      Take 325 mg by mouth daily        atorvastatin 40 MG tablet    LIPITOR    90 tablet    Take 1 tablet (40 mg) by mouth daily    Hyperlipidemia LDL goal <70       ibuprofen 600 MG tablet    ADVIL/MOTRIN    30 tablet    Take 1 tablet (600 mg) by mouth every 6 hours as needed for moderate pain        losartan 25 MG tablet    COZAAR    90 tablet    Take 1 tablet (25 mg) by mouth daily    Benign essential hypertension       metoprolol succinate 50 MG 24 hr tablet    TOPROL-XL    90 tablet    Take 1 tablet (50 mg) by mouth daily    Coronary artery disease involving native coronary artery of native heart without angina pectoris       MULTI-VITAMIN PO      Take by mouth daily        nitroGLYcerin 0.4 MG sublingual tablet    NITROSTAT    25 tablet    Place 1 tablet (0.4 mg) under the tongue every  5 minutes as needed for chest pain    Coronary artery disease due to calcified coronary lesion       omeprazole 20 MG CR capsule    priLOSEC    90 capsule    Take 1 capsule (20 mg) by mouth daily    Gastroesophageal reflux disease without esophagitis       TYLENOL PO      Take 1,000 mg by mouth every 4 hours as needed for mild pain or fever        VITAMIN D PO      Take 1,000 mg by mouth daily

## 2018-08-20 NOTE — LETTER
8/20/2018      Ric Villela MD, MD  [address]  [facility] [address]      RE: Manan Bryant       Dear Colleague,    I had the pleasure of seeing Manan Bryant in the [facility].    Service Date: 08/20/2018      HISTORY OF PRESENT ILLNESS:  Mr. Bryant is a very nice 63-year-old gentleman who I originally met in 2010 when he presented with an acute inferior wall myocardial infarction.  We performed successful aspiration thrombectomy and stenting of an occluded left anterior descending artery.  Troponin rise was only 7.  His sister-in-law is Dr. Shavon Langley.  Post-infarct in Cardiac Rehab, he was noted to have nonsustained runs of ventricular tachycardia and was seen by Dr. Wooten.  Ejection fraction was 50%.  Subsequent Holter monitors demonstrated no significant arrhythmias other than PVCs which date back many years and appear to be effectively treated by beta blockers.      Manan initially had problems with beta blocker side effects including fatigue and tiredness.  He noted if he missed his beta blocker, he had more energy and spark; however, his PVCs would increase.  A trial of Bystolic resulted in no significant improvement.  He also has cool extremities with cool hands and cool feet.        Last evaluation was coronary anatomy was a stress nuclear scan in 2015 demonstrating 2 tiny defects, a small, mostly reversible apical defect consistent with minimal triston-infarct ischemia and a mostly reversible inferolateral defect at the base that was consistent with mild ischemia but possibly diaphragmatic attenuation.  Ejection fraction again was estimated to be 59%.      A spell in 2016 led to another trip to the EP Lab where he was found to be noninducible and a Reveal was implanted.      More recently he has been undergoing evaluation for elevated liver function tests including transaminases and alkaline phosphatase.  Ultimately a recent ultrasound showed  that he has fatty liver and he has not followed up on that yet.  With this workup, he has been exercising more diligently of watching his weight and has managed to lose 16 pounds since last year.  He is an avid cyclist and states he did a long ride yesterday and this resulted in no chest, arm, neck, jaw or shoulder discomfort.  He has no lightheadedness, dizziness, syncope or near-syncope.  He states he is tolerating his metoprolol quite well.  He was having some problems with hypertension for which lisinopril was initiated.  He initially had a cough and now has switched to losartan.  He states his palpitations are doing well.      ASSESSMENT AND PLAN:  Manan appears to be doing quite well from a cardiac standpoint without clinical evidence of ischemia.  At this time, I do not think we need to do any formal stress testing.      There is no evidence of heart failure or significant arrhythmia.  The EP study is reassuring and interrogation of his Reveal shows no significant ectopy.      Blood pressure is ideal at 113/73 with a pulse of 68.      I congratulated him on his weight loss down to 206 pounds with clothes on.  This gives him a body mass index of 28.8, moving out of the obese category.      Fasting lipid profile is okay.  Total cholesterol is 136, HDL 43, LDL 75, triglycerides are 88.  Ideally, I would like to switch him to rosuvastatin, but I do want to muddy the moore with his workup of his fatty liver and elevated liver function tests.  I have reinforced the importance of continued exercise and weight loss.  He also has eliminated diet pop of which he states he was drinking a great deal.  He states the plan right now he is to repeat his liver function tests in 3 months.  His most recent lab work was through the [facility] and showed AST of 62, ALT of 73 and alkaline phosphatase of 119.  At this time, I do not think we need to stop the statins, but I will defer to Dr. Villela if feels he wants to try a  statin holiday, but it does appear to be more of a cholestatic process.      Electrolytes are normal with a creatinine of 1.16, BUN of 17, potassium of 4.4.      He will continue to follow in our Device Clinic quarterly.  I will have him see my TYREL in 1 year.  I will see him back in 2 years.  If he should have any problems, I would see him sooner.         DIANDRA JACOBSON MD, [facility]             D: 2018   T: 2018   MT: CHELSEA      Name:     HENRY MEJIA   MRN:      [id number]        Account:      [id number]   :      1955           Service Date: 2018      Document: [id number]         Outpatient Encounter Prescriptions as of 2018   Medication Sig Dispense Refill     Acetaminophen (TYLENOL PO) Take 1,000 mg by mouth every 4 hours as needed for mild pain or fever       ASPIRIN PO Take 325 mg by mouth daily        atorvastatin (LIPITOR) 40 MG tablet Take 1 tablet (40 mg) by mouth daily 90 tablet 3     Cholecalciferol (VITAMIN D PO) Take 1,000 mg by mouth daily        ibuprofen (ADVIL,MOTRIN) 600 MG tablet Take 1 tablet (600 mg) by mouth every 6 hours as needed for moderate pain 30 tablet 1     losartan (COZAAR) 25 MG tablet Take 1 tablet (25 mg) by mouth daily 90 tablet 3     metoprolol succinate (TOPROL-XL) 50 MG 24 hr tablet Take 1 tablet (50 mg) by mouth daily 90 tablet 3     Multiple Vitamin (MULTI-VITAMIN PO) Take by mouth daily        nitroGLYcerin (NITROSTAT) 0.4 MG sublingual tablet Place 1 tablet (0.4 mg) under the tongue every 5 minutes as needed for chest pain 25 tablet 3     omeprazole (PRILOSEC) 20 MG CR capsule Take 1 capsule (20 mg) by mouth daily 90 capsule 0     [DISCONTINUED] atorvastatin (LIPITOR) 40 MG tablet Take 1 tablet (40 mg) by mouth daily 90 tablet 1     [DISCONTINUED] losartan (COZAAR) 25 MG tablet Take 1 tablet (25 mg) by mouth daily 30 tablet 11     [DISCONTINUED] metoprolol (TOPROL-XL) 50 MG 24 hr tablet Take 1 tablet (50 mg) by mouth daily 90 tablet 3      No facility-administered encounter medications on file as of 8/20/2018.        Again, thank you for allowing me to participate in the care of your patient.      Sincerely,    Abbe Corley MD     [facility]

## 2018-08-20 NOTE — LETTER
8/20/2018    Ric Villela MD, MD  [address] [facility] [address]  [facility] [address]    RE: Manan Bryant       Dear Colleague,    I had the pleasure of seeing Manan Bryant in the [facility].    HPI and Plan:   See dictation    No orders of the defined types were placed in this encounter.      Orders Placed This Encounter   Medications     metoprolol succinate (TOPROL-XL) 50 MG 24 hr tablet     Sig: Take 1 tablet (50 mg) by mouth daily     Dispense:  90 tablet     Refill:  3     losartan (COZAAR) 25 MG tablet     Sig: Take 1 tablet (25 mg) by mouth daily     Dispense:  90 tablet     Refill:  3     atorvastatin (LIPITOR) 40 MG tablet     Sig: Take 1 tablet (40 mg) by mouth daily     Dispense:  90 tablet     Refill:  3       Medications Discontinued During This Encounter   Medication Reason     metoprolol (TOPROL-XL) 50 MG 24 hr tablet Reorder     losartan (COZAAR) 25 MG tablet Reorder     atorvastatin (LIPITOR) 40 MG tablet Reorder         Encounter Diagnoses   Name Primary?     Coronary artery disease involving native coronary artery of native heart without angina pectoris Yes     Hyperlipidemia LDL goal <70      PVC (premature ventricular contraction)      Benign essential hypertension      SVT (supraventricular tachycardia) (H)      VT (ventricular tachycardia) (H)      HDL deficiency        CURRENT MEDICATIONS:  Current Outpatient Prescriptions   Medication Sig Dispense Refill     Acetaminophen (TYLENOL PO) Take 1,000 mg by mouth every 4 hours as needed for mild pain or fever       ASPIRIN PO Take 325 mg by mouth daily        atorvastatin (LIPITOR) 40 MG tablet Take 1 tablet (40 mg) by mouth daily 90 tablet 3     Cholecalciferol (VITAMIN D PO) Take 1,000 mg by mouth daily        ibuprofen (ADVIL,MOTRIN) 600 MG tablet Take 1 tablet (600 mg) by mouth every 6 hours as needed for moderate pain 30 tablet 1     losartan (COZAAR) 25 MG tablet Take 1 tablet (25 mg) by mouth daily 90  tablet 3     metoprolol succinate (TOPROL-XL) 50 MG 24 hr tablet Take 1 tablet (50 mg) by mouth daily 90 tablet 3     Multiple Vitamin (MULTI-VITAMIN PO) Take by mouth daily        nitroGLYcerin (NITROSTAT) 0.4 MG sublingual tablet Place 1 tablet (0.4 mg) under the tongue every 5 minutes as needed for chest pain 25 tablet 3     omeprazole (PRILOSEC) 20 MG CR capsule Take 1 capsule (20 mg) by mouth daily 90 capsule 0     [DISCONTINUED] atorvastatin (LIPITOR) 40 MG tablet Take 1 tablet (40 mg) by mouth daily 90 tablet 1     [DISCONTINUED] losartan (COZAAR) 25 MG tablet Take 1 tablet (25 mg) by mouth daily 30 tablet 11     [DISCONTINUED] metoprolol (TOPROL-XL) 50 MG 24 hr tablet Take 1 tablet (50 mg) by mouth daily 90 tablet 3       ALLERGIES   No Known Allergies    PAST MEDICAL HISTORY:  Past Medical History:   Diagnosis Date     CAD (coronary artery disease)     cardiac cath 8/5/2010: ELLIOTT to LAD     Family history of early CAD      History of acute anterior wall MI 2010 2010     History of colonic polyps      Mixed hyperlipidemia      Myocardial infarction      Paroxysmal supraventricular tachycardia (H)      PVC (premature ventricular contraction)     Hx ventricular tachycardia during cardiac rehab 2010     SVT (supraventricular tachycardia) (H)        PAST SURGICAL HISTORY:  Past Surgical History:   Procedure Laterality Date     STENT, CORONARY, EMEKA  8/2010    LAD       FAMILY HISTORY:  Family History   Problem Relation Age of Onset     Coronary Artery Disease Father      Myocardial Infarction Father      Coronary Artery Disease Brother      Myocardial Infarction Brother      Heart Surgery Brother      bypass     Coronary Artery Disease Paternal Grandfather      Myocardial Infarction Paternal Grandfather      Sleep Apnea Sister      Family History Negative Mother      Family History Negative Maternal Grandmother      Heart Failure Maternal Grandfather      Family History Negative Paternal Grandmother       "Myocardial Infarction Brother      Coronary Artery Disease Brother      Heart Surgery Brother      bypass       SOCIAL HISTORY:  Social History     Social History     Marital status:      Spouse name: N/A     Number of children: N/A     Years of education: N/A     Social History Main Topics     Smoking status: Never Smoker     Smokeless tobacco: Never Used     Alcohol use No     Drug use: No     Sexual activity: No     Other Topics Concern     Caffeine Concern No     decaf: 1-2 cups a day. Diek coke: 4-5 cans a day     Sleep Concern No     Stress Concern No     Weight Concern No     Special Diet Yes     limited, heart healthy     Exercise Yes     bike outside, 1-3x a week     Bike Helmet Yes     Seat Belt Yes     Social History Narrative       Review of Systems:  Skin:  Negative       Eyes:  Positive for glasses    ENT:  Negative      Respiratory:  Negative       Cardiovascular:  Negative;chest pain;palpitations;syncope or near-syncope;cyanosis;fatigue;edema;exercise intolerance Positive for;lightheadedness    Gastroenterology: Negative      Genitourinary:  Negative      Musculoskeletal:  Positive for joint pain;arthritis    Neurologic:  Negative      Psychiatric:  Negative      Heme/Lymph/Imm:  Negative      Endocrine:  Positive for   states has elevated liver test, recent US abdomen    Physical Exam:  Vitals: /73 (BP Location: Left arm, Cuff Size: Adult Large)  Pulse 60  Ht 1.803 m (5' 11\")  Wt 93.6 kg (206 lb 4.8 oz)  BMI 28.77 kg/m2    Constitutional:  cooperative, alert and oriented, well developed, well nourished, in no acute distress        Skin:  warm and dry to the touch, no apparent skin lesions or masses noted          Head:  normocephalic, no masses or lesions        Eyes:  pupils equal and round, conjunctivae and lids unremarkable, sclera white, no xanthalasma, EOMS intact, no nystagmus        Lymph:      ENT:  no pallor or cyanosis, dentition good        Neck:  carotid pulses are full " and equal bilaterally;no carotid bruit        Respiratory:  normal breath sounds, clear to auscultation, normal A-P diameter, normal symmetry, normal respiratory excursion, no use of accessory muscles         Cardiac: regular rhythm;normal S1 and S2;no S3 or S4;no murmurs, gallops or rubs detected                pulses full and equal                                        GI:  not assessed this visit        Extremities and Muscular Skeletal:  no edema;no spinal abnormalities noted;normal muscle strength and tone              Neurological:  no gross motor deficits        Psych:  affect appropriate, oriented to time, person and place        CC  No referring provider defined for this encounter.                Thank you for allowing me to participate in the care of your patient.      Sincerely,     Abbe Corley MD     [facility]    cc:   No referring provider defined for this encounter.

## 2018-08-20 NOTE — PROGRESS NOTES
Service Date: 08/20/2018      HISTORY OF PRESENT ILLNESS:  Mr. Bryant is a very nice 63-year-old gentleman who I originally met in 2010 when he presented with an acute inferior wall myocardial infarction.  We performed successful aspiration thrombectomy and stenting of an occluded left anterior descending artery.  Troponin rise was only 7.  His sister-in-law is Dr. Shavon Langley.  Post-infarct in Cardiac Rehab, he was noted to have nonsustained runs of ventricular tachycardia and was seen by Dr. Wooten.  Ejection fraction was 50%.  Subsequent Holter monitors demonstrated no significant arrhythmias other than PVCs which date back many years and appear to be effectively treated by beta blockers.      Manan initially had problems with beta blocker side effects including fatigue and tiredness.  He noted if he missed his beta blocker, he had more energy and spark; however, his PVCs would increase.  A trial of Bystolic resulted in no significant improvement.  He also has cool extremities with cool hands and cool feet.        Last evaluation was coronary anatomy was a stress nuclear scan in 2015 demonstrating 2 tiny defects, a small, mostly reversible apical defect consistent with minimal triston-infarct ischemia and a mostly reversible inferolateral defect at the base that was consistent with mild ischemia but possibly diaphragmatic attenuation.  Ejection fraction again was estimated to be 59%.      A spell in 2016 led to another trip to the EP Lab where he was found to be noninducible and a Reveal was implanted.      More recently he has been undergoing evaluation for elevated liver function tests including transaminases and alkaline phosphatase.  Ultimately a recent ultrasound showed that he has fatty liver and he has not followed up on that yet.  With this workup, he has been exercising more diligently of watching his weight and has managed to lose 16 pounds since last year.  He is an avid cyclist and states he did a  long ride yesterday and this resulted in no chest, arm, neck, jaw or shoulder discomfort.  He has no lightheadedness, dizziness, syncope or near-syncope.  He states he is tolerating his metoprolol quite well.  He was having some problems with hypertension for which lisinopril was initiated.  He initially had a cough and now has switched to losartan.  He states his palpitations are doing well.      ASSESSMENT AND PLAN:  aMnan appears to be doing quite well from a cardiac standpoint without clinical evidence of ischemia.  At this time, I do not think we need to do any formal stress testing.      There is no evidence of heart failure or significant arrhythmia.  The EP study is reassuring and interrogation of his Reveal shows no significant ectopy.      Blood pressure is ideal at 113/73 with a pulse of 68.      I congratulated him on his weight loss down to 206 pounds with clothes on.  This gives him a body mass index of 28.8, moving out of the obese category.      Fasting lipid profile is okay.  Total cholesterol is 136, HDL 43, LDL 75, triglycerides are 88.  Ideally, I would like to switch him to rosuvastatin, but I do want to muddy the moore with his workup of his fatty liver and elevated liver function tests.  I have reinforced the importance of continued exercise and weight loss.  He also has eliminated diet pop of which he states he was drinking a great deal.  He states the plan right now he is to repeat his liver function tests in 3 months.  His most recent lab work was through the [facility] and showed AST of 62, ALT of 73 and alkaline phosphatase of 119.  At this time, I do not think we need to stop the statins, but I will defer to Dr. Villela if feels he wants to try a statin holiday, but it does appear to be more of a cholestatic process.      Electrolytes are normal with a creatinine of 1.16, BUN of 17, potassium of 4.4.      He will continue to follow in our [facility] quarterly.  I will have him see  my TYREL in 1 year.  I will see him back in 2 years.  If he should have any problems, I would see him sooner.         DIANDRA JACOBSON MD, [facility]             D: 2018   T: 2018   MT: CHELSEA      Name:     HENRY MEJIA   MRN:      [id number]        Account:      [id number]   :      1955           Service Date: 2018      Document: [id number]

## 2018-09-17 ENCOUNTER — ALLIED HEALTH/NURSE VISIT (OUTPATIENT)
Dept: CARDIOLOGY | Facility: CLINIC | Age: 63
End: 2018-09-17
Payer: COMMERCIAL

## 2018-09-17 DIAGNOSIS — R55 SYNCOPE: ICD-10-CM

## 2018-09-17 DIAGNOSIS — Z45.09 ENCOUNTER FOR LOOP RECORDER CHECK: Primary | ICD-10-CM

## 2018-09-17 PROCEDURE — 93297 REM INTERROG DEV EVAL ICPMS: CPT | Performed by: INTERNAL MEDICINE

## 2018-09-17 PROCEDURE — 93299 C ICM/ILR REMOTE TECH SERV: CPT | Performed by: INTERNAL MEDICINE

## 2018-09-17 NOTE — MR AVS SNAPSHOT
After Visit Summary   9/17/2018    Manan Bryant    MRN: [id number]           Patient Information     Date Of Birth          1955        Visit Information        Provider Department      9/17/2018 4:30 PM PEDERSON DCR2 [facility]        Today's Diagnoses     Encounter for loop recorder check    -  1    Syncope           Follow-ups after your visit        Your next 10 appointments already scheduled     Art 10, 2019  4:30 PM CST   Remote ICD Check with PEDERSON DCR2   [facility] ([facility])    [address]  [place] [address] [phone] OPT 2           This appointment is for a remote check of your debrillator.  This is not an appointment at the office.              Who to contact     If you have questions or need follow up information about today's clinic visit or your schedule please contact [facility] directly at [phone].  Normal or non-critical lab and imaging results will be communicated to you by [employer]ac[employer], letter or phone within 4 business days after the clinic has received the results. If you do not hear from us within 7 days, please contact the clinic through [employer]ac[employer] or phone. If you have a critical or abnormal lab result, we will notify you by phone as soon as possible.  Submit refill requests through [employer] or call your pharmacy and they will forward the refill request to us. Please allow 3 business days for your refill to be completed.          Additional Information About Your Visit        Aracahart Information     [employer] gives you secure access to your electronic health record. If you see a primary care provider, you can also send messages to your care team and make appointments. If you have questions, please call your primary care clinic.  If you do not have a primary care provider, please call [phone] and they will assist you.         Care EveryWhere ID     This is your Care EveryWhere ID. This could be used by other organizations to access your [facility] medical records  [id number]         Blood Pressure from Last 3 Encounters:   08/20/18 113/73   06/01/18 116/68   05/21/18 128/80    Weight from Last 3 Encounters:   08/20/18 93.6 kg (206 lb 4.8 oz)   06/01/18 97.1 kg (214 lb)   05/21/18 98.9 kg (218 lb)              We Performed the Following     C ICM DEVICE INTERROGAT REMOTE (38044)     ILR REMOTE TECH SERV (26207)        Primary Care Provider Office Phone # Fax #    Ric Marcos Villela [phone] [address] [address]        Equal Access to Services     [facility]: Hadii aad ku hadasho Soomaali, waaxda luqadaha, qaybta kaalmada adeegyada, waxay migue haylico quiles . So [facility] [phone].    ATENCIÓN: Si habla español, tiene a das disposición servicios gratuitos de asistencia lingüística. Sandra al [phone].    We comply with applicable federal civil rights laws and [place] laws. We do not discriminate on the basis of race, color, national origin, age, disability, sex, sexual orientation, or gender identity.            Thank you!     Thank you for choosing [facility]  for your care. Our goal is always to provide you with excellent care. Hearing back from our patients is one way we can continue to improve our services. Please take a few minutes to complete the written survey that you may receive in the mail after your visit with us. Thank you!             Your Updated Medication List - Protect others around you: Learn how to safely use, store and throw away your medicines at www.disposemymeds.org.          This list is accurate as of 9/17/18 11:59 PM.  Always use your most recent med list.                   Brand Name Dispense Instructions for use Diagnosis    ASPIRIN PO      Take 325 mg by mouth daily        atorvastatin 40 MG tablet     LIPITOR    90 tablet    Take 1 tablet (40 mg) by mouth daily    Hyperlipidemia LDL goal <70       ibuprofen 600 MG tablet    ADVIL/MOTRIN    30 tablet    Take 1 tablet (600 mg) by mouth every 6 hours as needed for moderate pain        losartan 25 MG tablet    COZAAR    90 tablet    Take 1 tablet (25 mg) by mouth daily    Benign essential hypertension       metoprolol succinate 50 MG 24 hr tablet    TOPROL-XL    90 tablet    Take 1 tablet (50 mg) by mouth daily    Coronary artery disease involving native coronary artery of native heart without angina pectoris       MULTI-VITAMIN PO      Take by mouth daily        nitroGLYcerin 0.4 MG sublingual tablet    NITROSTAT    25 tablet    Place 1 tablet (0.4 mg) under the tongue every 5 minutes as needed for chest pain    Coronary artery disease due to calcified coronary lesion       omeprazole 20 MG CR capsule    priLOSEC    90 capsule    Take 1 capsule (20 mg) by mouth daily    Gastroesophageal reflux disease without esophagitis       TYLENOL PO      Take 1,000 mg by mouth every 4 hours as needed for mild pain or fever        VITAMIN D PO      Take 1,000 mg by mouth daily

## 2018-09-18 NOTE — PROGRESS NOTES
Medtronic Reveal Linq Loop Recorder   Symptom: 0 Tachy: 0 Pause: 0 Abraham: 0  AT: 0 AF: 0  Time in AT/AF: 0 %  Heart rate: Stable with good variability Battery: OK  Careplan: Next remote in 3 months. INDER Gast[employer]

## 2018-11-13 DIAGNOSIS — K21.9 GASTROESOPHAGEAL REFLUX DISEASE WITHOUT ESOPHAGITIS: ICD-10-CM

## 2018-12-16 ENCOUNTER — MYC MEDICAL ADVICE (OUTPATIENT)
Dept: CARDIOLOGY | Facility: CLINIC | Age: 63
End: 2018-12-16

## 2018-12-16 DIAGNOSIS — E78.5 HYPERLIPIDEMIA LDL GOAL <70: Primary | Chronic | ICD-10-CM

## 2018-12-17 NOTE — TELEPHONE ENCOUNTER
My Chart message -   At my last appointment (August?), you had thought that there was a better statin drug for me to consider.  As we discussed, you would have changed the medication at that time, except that my [place] appointment had indicated some of my liver tests were not in the normal ran  ge, and they were following up on any potential liver issues.  As a result, you felt it was best not to introduce a change in medication as it might result in a new variable to consider.    As of last week, the issues with the liver tests were resolved, and the liver panel came back normal.    As a result, I am wondering if the statin prescription should be changed now to the better medicine.    Thank you!

## 2018-12-18 RX ORDER — ROSUVASTATIN CALCIUM 40 MG/1
40 TABLET, COATED ORAL DAILY
Qty: 90 TABLET | Refills: 2 | Status: SHIPPED | OUTPATIENT
Start: 2018-12-18 | End: 2019-07-12

## 2018-12-18 NOTE — TELEPHONE ENCOUNTER
Per Dr. Corley's reply Patient called.   Patient agrees with stopping atorvastatin and changing to Rosuvastatin 40 mg one tablet daily.  New prescription e scribed to pharmacy. FLP/ALT orders for 1 month recheck entered.

## 2019-01-18 ENCOUNTER — DOCUMENTATION ONLY (OUTPATIENT)
Dept: CARDIOLOGY | Facility: CLINIC | Age: 64
End: 2019-01-18

## 2019-01-18 NOTE — PROGRESS NOTES
Patient missed Carelink transmission on 1/10/19. Sent letter 1/11, no response. Sent 1 week letter today

## 2019-02-05 PROBLEM — M25.552 HIP PAIN, LEFT: Status: RESOLVED | Noted: 2018-06-14 | Resolved: 2019-02-05

## 2019-03-16 DIAGNOSIS — K21.9 GASTROESOPHAGEAL REFLUX DISEASE WITHOUT ESOPHAGITIS: ICD-10-CM

## 2019-05-16 ENCOUNTER — HEALTH MAINTENANCE LETTER (OUTPATIENT)
Age: 64
End: 2019-05-16

## 2019-06-01 ENCOUNTER — OFFICE VISIT (OUTPATIENT)
Dept: URGENT CARE | Facility: URGENT CARE | Age: 64
End: 2019-06-01
Payer: COMMERCIAL

## 2019-06-01 VITALS
BODY MASS INDEX: 31.94 KG/M2 | SYSTOLIC BLOOD PRESSURE: 132 MMHG | TEMPERATURE: 96.3 F | WEIGHT: 229 LBS | OXYGEN SATURATION: 96 % | DIASTOLIC BLOOD PRESSURE: 88 MMHG | HEART RATE: 51 BPM
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
## 2019-06-01 DIAGNOSIS — I10 BENIGN ESSENTIAL HYPERTENSION: ICD-10-CM

## 2019-06-01 PROCEDURE — 99214 OFFICE O/P EST MOD 30 MIN: CPT | Performed by: FAMILY MEDICINE

## 2019-06-01 RX ORDER — LOSARTAN POTASSIUM 25 MG/1
25 TABLET ORAL DAILY
Qty: 30 TABLET | Refills: 0 | Status: SHIPPED | OUTPATIENT
Start: 2019-06-01 | End: 2019-07-12

## 2019-06-01 RX ORDER — DOXYCYCLINE HYCLATE 100 MG
200 TABLET ORAL ONCE
Qty: 2 TABLET | Refills: 0 | Status: SHIPPED | OUTPATIENT
Start: 2019-06-01 | End: 2019-07-12

## 2019-06-01 NOTE — PROGRESS NOTES
Subjective:   Manan Bryant is a 63 year old male who presents for   Chief Complaint   Patient presents with     Insect Bites     Location lower L back- x 2 days, discomfort, swelling, and pain. 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 He does try to wear protection for tick (gaiters and big boots) - he noticed a lesion on his back over the last couple days that has become swollen and discomforting.     He denies any fevers. Did not visibly see a tick.     Losartan ran out of medication 5 days ago    Patient Active Problem List    Diagnosis Date Noted     HDL deficiency 08/20/2018     Priority: Medium     Paroxysmal supraventricular tachycardia (H)      Priority: Medium     VT (ventricular tachycardia) (H) 07/07/2016     Priority: Medium     SVT (supraventricular tachycardia) (H)      Priority: Medium     IFG (impaired fasting glucose) 05/13/2016     Priority: Medium     History of acute anterior wall MI      Priority: Medium     2010       Coronary artery disease involving native coronary artery of native heart without angina pectoris      Priority: Medium     cardiac cath 8/5/2010: ELLIOTT to LAD       Hyperlipidemia LDL goal <70      Priority: Medium     PVC (premature ventricular contraction)      Priority: Medium     Hx ventricular tachycardia during cardiac rehab 2010       Family history of early CAD      Priority: Medium       Current Outpatient Medications   Medication     Acetaminophen (TYLENOL PO)     ASPIRIN PO     Cholecalciferol (VITAMIN D PO)     doxycycline hyclate (VIBRA-TABS) 100 MG tablet     ibuprofen (ADVIL,MOTRIN) 600 MG tablet     losartan (COZAAR) 25 MG tablet     metoprolol succinate (TOPROL-XL) 50 MG 24 hr tablet     Multiple Vitamin (MULTI-VITAMIN PO)     omeprazole (PRILOSEC) 20 MG DR capsule     rosuvastatin (CRESTOR) 40 MG tablet     nitroGLYcerin (NITROSTAT) 0.4 MG sublingual tablet     No current  facility-administered medications for this visit.        ROS:  As above per HPI    Objective:   /88   Pulse 51   Temp 96.3  F (35.7  C)   Wt 103.9 kg (229 lb)   SpO2 96%   BMI 31.94 kg/m  , Body mass index is 31.94 kg/m .  Gen:  NAD, well-nourished, sitting in chair comfortably  HEENT: EOMI, sclera anicteric, Head normocephalic, ; nares patent; moist mucous membranes  Neck: trachea midline, no thyromegaly  CV:  Hemodynamically stable  Pulm:  no increased work of breathing  Extrem: no cyanosis, edema or clubbing  Skin: left lower torso area there is a 1.2cm in diameter area of redness with a central punctate lesion that is well healed.   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 We reviewed CDC guidelines on when to treat, he still would like a dose today which I honored. Recommended he observe for signs of worsening skin infection that would require re-evaluation.   - doxycycline hyclate (VIBRA-TABS) 100 MG tablet  Dispense: 2 tablet; Refill: 0    Benign essential hypertension  Normotensive despite missing dose. Hx of MI and is on medication for this reason. Discussed future refills should come from his prescribing doctors, he understands this.   - losartan (COZAAR) 25 MG tablet  Dispense: 30 tablet; Refill: 0      Fito Vines MD   [place] [facility]    The use of Dragon/KeyView dictation services may have been used to construct the content in this note; any grammatical or spelling errors are non-intentional. Please contact the author of this note directly if you are in need of any clarification.

## 2019-06-01 NOTE — PATIENT INSTRUCTIONS
Take dose of doxycline (1 dose)       Observe the area for increasing redness/swelling/pain if so please reach out to us so we can re-evaluate      If you develop fevers, joint pain, headache, significant fatigue return immediately for re-evaluation    --    Take losartan once a day as prescribed.   -- refills must be received from your primary care doctor going forward

## 2019-06-18 DIAGNOSIS — K21.9 GASTROESOPHAGEAL REFLUX DISEASE WITHOUT ESOPHAGITIS: ICD-10-CM

## 2019-06-18 NOTE — TELEPHONE ENCOUNTER
"omeprazole (PRILOSEC) 20 MG DR capsule  Last Written Prescription Date:  3/18/19  Last Fill Quantity: 90,  # refills: 0   Last office visit: 9/17/2018 with prescribing provider:  Tika    Future Office Visit:        Requested Prescriptions   Pending Prescriptions Disp Refills     omeprazole (PRILOSEC) 20 MG DR capsule [Pharmacy Med Name: OMEPRAZOLE 20MG CAPSULES] 90 capsule 0     Sig: TAKE 1 CAPSULE BY MOUTH DAILY       PPI Protocol Failed - 6/18/2019  6:57 AM        Failed - Recent (12 mo) or future (30 days) visit within the authorizing provider's specialty     Patient had office visit in the last 12 months or has a visit in the next 30 days with authorizing provider or within the authorizing provider's specialty.  See \"Patient Info\" tab in inbasket, or \"Choose Columns\" in Meds & Orders section of the refill encounter.              Passed - Not on Clopidogrel (unless Pantoprazole ordered)        Passed - No diagnosis of osteoporosis on record        Passed - Medication is active on med list        Passed - Patient is age 18 or older         "

## 2019-06-19 ENCOUNTER — TELEPHONE (OUTPATIENT)
Dept: FAMILY MEDICINE | Facility: CLINIC | Age: 64
End: 2019-06-19

## 2019-06-19 DIAGNOSIS — Z00.00 ROUTINE GENERAL MEDICAL EXAMINATION AT A HEALTH CARE FACILITY: Primary | ICD-10-CM

## 2019-06-19 NOTE — TELEPHONE ENCOUNTER
*Correction to below: last OV with PCP 6/1/18    Medication is being filled for 1 time refill only due to:  Patient needs to be seen because it has been more than one year since last visit.   [employer] message advising overdue for OV   Nadine HERNANDEZ RN

## 2019-06-19 NOTE — TELEPHONE ENCOUNTER
Reason for Call: Request for an order or referral:    Order or referral being requested: Physical labs    Date needed: as soon as possible    Has the patient been seen by the PCP for this problem? NO    Additional comments: Patient has a physical scheduled on 7/12 but would like to get his lab work done ahead of time at the [facility]    Phone number Patient can be reached at:  Cell number on file:    Telephone Information:   Mobile [phone]       Best Time:  any    Can we leave a detailed message on this number?  YES    Call taken on 6/19/2019 at 8:57 AM by Shameka Amaro

## 2019-06-21 DIAGNOSIS — E78.5 HYPERLIPIDEMIA LDL GOAL <70: Chronic | ICD-10-CM

## 2019-06-21 DIAGNOSIS — Z00.00 ROUTINE GENERAL MEDICAL EXAMINATION AT A HEALTH CARE FACILITY: ICD-10-CM

## 2019-06-21 LAB
ALBUMIN SERPL-MCNC: 3.8 G/DL (ref 3.4–5)
ALP SERPL-CCNC: 103 U/L (ref 40–150)
ALT SERPL W P-5'-P-CCNC: 31 U/L (ref 0–70)
ANION GAP SERPL CALCULATED.3IONS-SCNC: 9 MMOL/L (ref 3–14)
AST SERPL W P-5'-P-CCNC: 26 U/L (ref 0–45)
BILIRUB SERPL-MCNC: 0.5 MG/DL (ref 0.2–1.3)
BUN SERPL-MCNC: 17 MG/DL (ref 7–30)
CALCIUM SERPL-MCNC: 8.6 MG/DL (ref 8.5–10.1)
CHLORIDE SERPL-SCNC: 110 MMOL/L (ref 94–109)
CHOLEST SERPL-MCNC: 116 MG/DL
CO2 SERPL-SCNC: 23 MMOL/L (ref 20–32)
CREAT SERPL-MCNC: 1.13 MG/DL (ref 0.66–1.25)
ERYTHROCYTE [DISTWIDTH] IN BLOOD BY AUTOMATED COUNT: 12.7 % (ref 10–15)
GFR SERPL CREATININE-BSD FRML MDRD: 69 ML/MIN/{1.73_M2}
GLUCOSE SERPL-MCNC: 110 MG/DL (ref 70–99)
HBA1C MFR BLD: 5.7 % (ref 0–5.6)
HCT VFR BLD AUTO: 44.6 % (ref 40–53)
HDLC SERPL-MCNC: 44 MG/DL
HGB BLD-MCNC: 15.5 G/DL (ref 13.3–17.7)
LDLC SERPL CALC-MCNC: 48 MG/DL
MCH RBC QN AUTO: 31.1 PG (ref 26.5–33)
MCHC RBC AUTO-ENTMCNC: 34.8 G/DL (ref 31.5–36.5)
MCV RBC AUTO: 90 FL (ref 78–100)
NONHDLC SERPL-MCNC: 72 MG/DL
PLATELET # BLD AUTO: 178 10E9/L (ref 150–450)
POTASSIUM SERPL-SCNC: 4.3 MMOL/L (ref 3.4–5.3)
PROT SERPL-MCNC: 6.7 G/DL (ref 6.8–8.8)
PSA SERPL-ACNC: 0.64 UG/L (ref 0–4)
RBC # BLD AUTO: 4.98 10E12/L (ref 4.4–5.9)
SODIUM SERPL-SCNC: 142 MMOL/L (ref 133–144)
TRIGL SERPL-MCNC: 122 MG/DL
WBC # BLD AUTO: 5.7 10E9/L (ref 4–11)

## 2019-06-21 PROCEDURE — 80061 LIPID PANEL: CPT | Performed by: INTERNAL MEDICINE

## 2019-06-21 PROCEDURE — 36415 COLL VENOUS BLD VENIPUNCTURE: CPT | Performed by: INTERNAL MEDICINE

## 2019-06-21 PROCEDURE — G0103 PSA SCREENING: HCPCS | Performed by: INTERNAL MEDICINE

## 2019-06-21 PROCEDURE — 83036 HEMOGLOBIN GLYCOSYLATED A1C: CPT | Performed by: INTERNAL MEDICINE

## 2019-06-21 PROCEDURE — 85027 COMPLETE CBC AUTOMATED: CPT | Performed by: INTERNAL MEDICINE

## 2019-06-21 PROCEDURE — 80053 COMPREHEN METABOLIC PANEL: CPT | Performed by: INTERNAL MEDICINE

## 2019-07-12 ENCOUNTER — OFFICE VISIT (OUTPATIENT)
Dept: FAMILY MEDICINE | Facility: CLINIC | Age: 64
End: 2019-07-12
Payer: COMMERCIAL

## 2019-07-12 DIAGNOSIS — I25.10 CORONARY ARTERY DISEASE INVOLVING NATIVE CORONARY ARTERY OF NATIVE HEART WITHOUT ANGINA PECTORIS: Chronic | ICD-10-CM

## 2019-07-12 DIAGNOSIS — K76.0 NAFLD (NONALCOHOLIC FATTY LIVER DISEASE): ICD-10-CM

## 2019-07-12 DIAGNOSIS — K22.2 ESOPHAGEAL STRICTURE: ICD-10-CM

## 2019-07-12 DIAGNOSIS — R73.01 IFG (IMPAIRED FASTING GLUCOSE): ICD-10-CM

## 2019-07-12 DIAGNOSIS — I10 BENIGN ESSENTIAL HYPERTENSION: ICD-10-CM

## 2019-07-12 DIAGNOSIS — E78.5 HYPERLIPIDEMIA LDL GOAL <70: Chronic | ICD-10-CM

## 2019-07-12 DIAGNOSIS — Z00.00 ROUTINE GENERAL MEDICAL EXAMINATION AT A HEALTH CARE FACILITY: Primary | ICD-10-CM

## 2019-07-12 DIAGNOSIS — K21.9 GASTROESOPHAGEAL REFLUX DISEASE WITHOUT ESOPHAGITIS: ICD-10-CM

## 2019-07-12 PROCEDURE — 90714 TD VACC NO PRESV 7 YRS+ IM: CPT | Performed by: INTERNAL MEDICINE

## 2019-07-12 PROCEDURE — 99396 PREV VISIT EST AGE 40-64: CPT | Mod: 25 | Performed by: INTERNAL MEDICINE

## 2019-07-12 PROCEDURE — 90471 IMMUNIZATION ADMIN: CPT | Performed by: INTERNAL MEDICINE

## 2019-07-12 PROCEDURE — 99213 OFFICE O/P EST LOW 20 MIN: CPT | Mod: 25 | Performed by: INTERNAL MEDICINE

## 2019-07-12 RX ORDER — LOSARTAN POTASSIUM 25 MG/1
25 TABLET ORAL DAILY
Qty: 90 TABLET | Refills: 3 | Status: SHIPPED | OUTPATIENT
Start: 2019-07-12 | End: 2019-10-04

## 2019-07-12 RX ORDER — METOPROLOL SUCCINATE 50 MG/1
50 TABLET, EXTENDED RELEASE ORAL DAILY
Qty: 90 TABLET | Refills: 3 | Status: SHIPPED | OUTPATIENT
Start: 2019-07-12 | End: 2019-10-04

## 2019-07-12 RX ORDER — ROSUVASTATIN CALCIUM 40 MG/1
40 TABLET, COATED ORAL DAILY
Qty: 90 TABLET | Refills: 3 | Status: SHIPPED | OUTPATIENT
Start: 2019-07-12 | End: 2019-10-04

## 2019-07-12 ASSESSMENT — MIFFLIN-ST. JEOR: SCORE: 1837.06

## 2019-07-12 NOTE — PATIENT INSTRUCTIONS
(Z00.00) Routine general medical examination at a health care facility  (primary encounter diagnosis)  Comment: For routine exam, we reviewed labs as ordered, cholesterol, diabetes mellitus check, liver function, renal function, PSA We will also update vaccination history.  Plan:     (I25.10) Coronary artery disease involving native coronary artery of native heart without angina pectoris  Comment: Recommend continued exercise   Plan:     (E78.5) Hyperlipidemia LDL goal <70  Comment: checked fasting lipid panel and was excellent  Plan:     (R73.01) IFG (impaired fasting glucose)  Comment: hemoglobin A1c was stable  Plan:     (K76.0) NAFLD (nonalcoholic fatty liver disease)  Comment: liver function tests looked better today.  Keep up the diet and try to focus on getting all of the  Plan:     Esophageal Stricture  Comment: continue omeprazole 20 mg daily and we can repeat upper endoscopy [facility] [phone]

## 2019-07-12 NOTE — NURSING NOTE
Screening Questionnaire for Adult Immunization    Are you sick today?   No   Do you have allergies to medications, food, a vaccine component or latex?   No   Have you ever had a serious reaction after receiving a vaccination?   No   Do you have a long-term health problem with heart disease, lung disease, asthma, kidney disease, metabolic disease (e.g. diabetes), anemia, or other blood disorder?   Yes   Do you have cancer, leukemia, HIV/AIDS, or any other immune system problem?   No   In the past 3 months, have you taken medications that affect  your immune system, such as prednisone, other steroids, or anticancer drugs; drugs for the treatment of rheumatoid arthritis, Crohn s disease, or psoriasis; or have you had radiation treatments?   No   Have you had a seizure, or a brain or other nervous system problem?   No   During the past year, have you received a transfusion of blood or blood     products, or been given immune (gamma) globulin or antiviral drug?   No   For women: Are you pregnant or is there a chance you could become        pregnant during the next month?   No   Have you received any vaccinations in the past 4 weeks?   No     Immunization questionnaire was positive for at least one answer.  Notified Tika .        Per orders of Dr. Villela , injection of TD given by Marjorie Ojeda. Patient instructed to remain in clinic for 15 minutes afterwards, and to report any adverse reaction to me immediately.       Screening performed by Marjorie Ojeda on 7/12/2019 at 9:15 AM.

## 2019-07-12 NOTE — PROGRESS NOTES
SUBJECTIVE:   CC: Manan Bryant is an 63 year old male who presents for preventative health visit.     Healthy Habits:     Getting at least 3 servings of Calcium per day:  NO    Bi-annual eye exam:  Yes    Dental care twice a year:  Yes    Sleep apnea or symptoms of sleep apnea:  None    Diet:  Regular (no restrictions)    Frequency of exercise:  1 day/week    Duration of exercise:  15-30 minutes    Taking medications regularly:  Yes    Medication side effects:  Lightheadedness    PHQ-2 Total Score: 0    Additional concerns today:  No        Today's PHQ-2 Score:   PHQ-2 ( 1999 Pfizer) 7/9/2019   Q1: Little interest or pleasure in doing things 0   Q2: Feeling down, depressed or hopeless 0   PHQ-2 Score 0   Q1: Little interest or pleasure in doing things Not at all   Q2: Feeling down, depressed or hopeless Not at all   PHQ-2 Score 0       Abuse: Current or Past(Physical, Sexual or Emotional)- No  Do you feel safe in your environment? Yes    Social History     Tobacco Use     Smoking status: Never Smoker     Smokeless tobacco: Never Used   Substance Use Topics     Alcohol use: No     Alcohol/week: 0.0 oz     If you drink alcohol do you typically have >3 drinks per day or >7 drinks per week? No    Alcohol Use 7/9/2019   Prescreen: >3 drinks/day or >7 drinks/week? Not Applicable   Prescreen: >3 drinks/day or >7 drinks/week? -       Last PSA:   PSA   Date Value Ref Range Status   06/21/2019 0.64 0 - 4 ug/L Final     Comment:     Assay Method:  Chemiluminescence using Siemens Vista analyzer       Reviewed orders with patient. Reviewed health maintenance and updated orders accordingly - Yes  Labs reviewed in EPIC    Reviewed and updated as needed this visit by clinical staff         Reviewed and updated as needed this visit by Provider        Past Medical History:   Diagnosis Date     CAD (coronary artery disease)     cardiac cath 8/5/2010: ELLIOTT to LAD     Family history of early CAD      History of acute anterior wall MI  "2010 2010     History of colonic polyps      Mixed hyperlipidemia      Myocardial infarction (H)      Paroxysmal supraventricular tachycardia (H)      PVC (premature ventricular contraction)     Hx ventricular tachycardia during cardiac rehab 2010     SVT (supraventricular tachycardia) (H)         Review of Systems  CONSTITUTIONAL: NEGATIVE for fever, chills, change in weight  INTEGUMENTARY/SKIN: NEGATIVE for worrisome rashes, moles or lesions  EYES: NEGATIVE for vision changes or irritation  ENT: NEGATIVE for ear, mouth and throat problems  RESP: NEGATIVE for significant cough or SOB  CV: NEGATIVE for chest pain, palpitations or peripheral edema  GI: history of esophageal stricture, occasional dysphagia   male: negative for dysuria, hematuria, decreased urinary stream, erectile dysfunction, urethral discharge  MUSCULOSKELETAL: NEGATIVE for significant arthralgias or myalgia  NEURO: NEGATIVE for weakness, dizziness or paresthesias  PSYCHIATRIC: NEGATIVE for changes in mood or affect      OBJECTIVE:   BP (P) 123/75 (BP Location: Left arm, Cuff Size: Adult Large)   Pulse (P) 52   Temp (P) 97.5  F (36.4  C) (Oral)   Ht (P) 1.816 m (5' 11.5\")   Wt (P) 100.2 kg (220 lb 14.4 oz)   SpO2 (P) 96%   BMI (P) 30.38 kg/m      Physical Exam  GENERAL: healthy, alert and no distress  EYES: Eyes grossly normal to inspection, PERRL and conjunctivae and sclerae normal  HENT: ear canals and TM's normal, nose and mouth without ulcers or lesions  NECK: no adenopathy, no asymmetry, masses, or scars and thyroid normal to palpation  RESP: lungs clear to auscultation - no rales, rhonchi or wheezes  CV: regular rate and rhythm, normal S1 S2, no S3 or S4, no murmur, click or rub, no peripheral edema and peripheral pulses strong  ABDOMEN: soft, nontender, no hepatosplenomegaly, no masses and bowel sounds normal  : normal prostate, no nodules  MS: no gross musculoskeletal defects noted, no edema  SKIN: no suspicious lesions or " "rashes  NEURO: Normal strength and tone, mentation intact and speech normal  PSYCH: mentation appears normal, affect normal/bright    Diagnostic Test Results:  Labs reviewed in Epic    ASSESSMENT/PLAN:     Patient Instructions   (Z00.00) Routine general medical examination at a health care facility  (primary encounter diagnosis)  Comment: For routine exam, we reviewed labs as ordered, cholesterol, diabetes mellitus check, liver function, renal function, PSA We will also update vaccination history.  Plan:     (I25.10) Coronary artery disease involving native coronary artery of native heart without angina pectoris  Comment: Recommend continued exercise   Plan:     (E78.5) Hyperlipidemia LDL goal <70  Comment: checked fasting lipid panel and was excellent  Plan:     (R73.01) IFG (impaired fasting glucose)  Comment: hemoglobin A1c was stable  Plan:     (K76.0) NAFLD (nonalcoholic fatty liver disease)  Comment: liver function tests looked better today.  Keep up the diet and try to focus on getting all of the  Plan:     Esophageal Stricture  Comment: continue omeprazole 20 mg daily and we can repeat upper endoscopy [facility] [phone]           COUNSELING:   Reviewed preventive health counseling, as reflected in patient instructions    Estimated body mass index is 30.38 kg/m  (pended) as calculated from the following:    Height as of this encounter: (P) 1.816 m (5' 11.5\").    Weight as of this encounter: (P) 100.2 kg (220 lb 14.4 oz).          reports that he has never smoked. He has never used smokeless tobacco.      Counseling Resources:  ATP IV Guidelines  Pooled Cohorts Equation Calculator  FRAX Risk Assessment  ICSI Preventive Guidelines  Dietary Guidelines for Americans, 2010  USDA's MyPlate  ASA Prophylaxis  Lung CA Screening    Ric Villela MD, MD  [facility]  "

## 2019-08-02 ENCOUNTER — TELEPHONE (OUTPATIENT)
Dept: CARDIOLOGY | Facility: CLINIC | Age: 64
End: 2019-08-02

## 2019-08-05 DIAGNOSIS — K21.9 GASTROESOPHAGEAL REFLUX DISEASE WITHOUT ESOPHAGITIS: ICD-10-CM

## 2019-08-06 ENCOUNTER — TRANSFERRED RECORDS (OUTPATIENT)
Dept: HEALTH INFORMATION MANAGEMENT | Facility: CLINIC | Age: 64
End: 2019-08-06

## 2019-08-06 NOTE — TELEPHONE ENCOUNTER
"Last Written Prescription Date:  7/12/19  Last Fill Quantity: 30 capsule,  # refills: 3   Last office visit: 7/12/2019 with prescribing provider:  Tika   Future Office Visit:      Requested Prescriptions   Pending Prescriptions Disp Refills     omeprazole (PRILOSEC) 20 MG DR capsule [Pharmacy Med Name: OMEPRAZOLE 20MG CAPSULES] 30 capsule 0     Sig: TAKE 1 CAPSULE(20 MG) BY MOUTH DAILY       PPI Protocol Passed - 8/5/2019  8:49 PM        Passed - Not on Clopidogrel (unless Pantoprazole ordered)        Passed - No diagnosis of osteoporosis on record        Passed - Recent (12 mo) or future (30 days) visit within the authorizing provider's specialty     Patient had office visit in the last 12 months or has a visit in the next 30 days with authorizing provider or within the authorizing provider's specialty.  See \"Patient Info\" tab in inbasket, or \"Choose Columns\" in Meds & Orders section of the refill encounter.              Passed - Medication is active on med list        Passed - Patient is age 18 or older          "

## 2019-08-30 ENCOUNTER — DOCUMENTATION ONLY (OUTPATIENT)
Dept: CARDIOLOGY | Facility: CLINIC | Age: 64
End: 2019-08-30

## 2019-08-30 NOTE — LETTER
August 30, 2019       TO: Manan Bryant  [address] [place] [address]       Dear Manan Bryant,    We are reviewing our outstanding orders.    Dr. Corley has ordered an office visit with his nurse practitioner and lab work for your annual follow up. Your last visit was in August, 2018. TYREL Elizabeth Richardson has some openings in September.     Please contact the scheduling desk at [phone] to arrange for an appointment.     If you have any questions, please call the Team 2 nurse phone @ [phone]. If you had your lab work done at another facility outside of the [facility] system, please give us a call so we can locate the results.     Thank you  Team 2 nurses    [facility] [place]

## 2019-09-26 DIAGNOSIS — I25.10 CORONARY ARTERY DISEASE INVOLVING NATIVE CORONARY ARTERY OF NATIVE HEART WITHOUT ANGINA PECTORIS: Chronic | ICD-10-CM

## 2019-09-26 LAB
ALT SERPL W P-5'-P-CCNC: 32 U/L (ref 0–70)
ANION GAP SERPL CALCULATED.3IONS-SCNC: 8 MMOL/L (ref 3–14)
BUN SERPL-MCNC: 18 MG/DL (ref 7–30)
CALCIUM SERPL-MCNC: 9.2 MG/DL (ref 8.5–10.1)
CHLORIDE SERPL-SCNC: 107 MMOL/L (ref 94–109)
CHOLEST SERPL-MCNC: 121 MG/DL
CO2 SERPL-SCNC: 23 MMOL/L (ref 20–32)
CREAT SERPL-MCNC: 1.14 MG/DL (ref 0.66–1.25)
GFR SERPL CREATININE-BSD FRML MDRD: 68 ML/MIN/{1.73_M2}
GLUCOSE SERPL-MCNC: 93 MG/DL (ref 70–99)
HDLC SERPL-MCNC: 46 MG/DL
LDLC SERPL CALC-MCNC: 59 MG/DL
NONHDLC SERPL-MCNC: 75 MG/DL
POTASSIUM SERPL-SCNC: 4.1 MMOL/L (ref 3.4–5.3)
SODIUM SERPL-SCNC: 138 MMOL/L (ref 133–144)
TRIGL SERPL-MCNC: 81 MG/DL

## 2019-09-26 PROCEDURE — 80061 LIPID PANEL: CPT | Performed by: INTERNAL MEDICINE

## 2019-09-26 PROCEDURE — 80048 BASIC METABOLIC PNL TOTAL CA: CPT | Performed by: INTERNAL MEDICINE

## 2019-09-26 PROCEDURE — 84460 ALANINE AMINO (ALT) (SGPT): CPT | Performed by: INTERNAL MEDICINE

## 2019-09-26 PROCEDURE — 36415 COLL VENOUS BLD VENIPUNCTURE: CPT | Performed by: INTERNAL MEDICINE

## 2019-10-02 ENCOUNTER — HEALTH MAINTENANCE LETTER (OUTPATIENT)
Age: 64
End: 2019-10-02

## 2019-10-04 ENCOUNTER — OFFICE VISIT (OUTPATIENT)
Dept: CARDIOLOGY | Facility: CLINIC | Age: 64
End: 2019-10-04
Payer: COMMERCIAL

## 2019-10-04 ENCOUNTER — ANCILLARY PROCEDURE (OUTPATIENT)
Dept: CARDIOLOGY | Facility: CLINIC | Age: 64
End: 2019-10-04
Attending: INTERNAL MEDICINE
Payer: COMMERCIAL

## 2019-10-04 VITALS
HEIGHT: 72 IN | BODY MASS INDEX: 29.12 KG/M2 | WEIGHT: 215 LBS | SYSTOLIC BLOOD PRESSURE: 130 MMHG | HEART RATE: 61 BPM | DIASTOLIC BLOOD PRESSURE: 82 MMHG

## 2019-10-04 DIAGNOSIS — I25.10 CORONARY ARTERY DISEASE DUE TO CALCIFIED CORONARY LESION: ICD-10-CM

## 2019-10-04 DIAGNOSIS — I25.10 CORONARY ARTERY DISEASE INVOLVING NATIVE CORONARY ARTERY OF NATIVE HEART WITHOUT ANGINA PECTORIS: Chronic | ICD-10-CM

## 2019-10-04 DIAGNOSIS — I25.84 CORONARY ARTERY DISEASE DUE TO CALCIFIED CORONARY LESION: ICD-10-CM

## 2019-10-04 DIAGNOSIS — R55 SYNCOPE: Primary | ICD-10-CM

## 2019-10-04 DIAGNOSIS — Z95.0 CARDIAC PACEMAKER IN SITU: ICD-10-CM

## 2019-10-04 DIAGNOSIS — E78.5 HYPERLIPIDEMIA LDL GOAL <70: Chronic | ICD-10-CM

## 2019-10-04 DIAGNOSIS — I10 BENIGN ESSENTIAL HYPERTENSION: ICD-10-CM

## 2019-10-04 PROCEDURE — 93298 REM INTERROG DEV EVAL SCRMS: CPT | Performed by: INTERNAL MEDICINE

## 2019-10-04 PROCEDURE — 93299 CARDIAC DEVICE CHECK - REMOTE: CPT | Performed by: INTERNAL MEDICINE

## 2019-10-04 PROCEDURE — 99214 OFFICE O/P EST MOD 30 MIN: CPT | Performed by: NURSE PRACTITIONER

## 2019-10-04 RX ORDER — NITROGLYCERIN 0.4 MG/1
0.4 TABLET SUBLINGUAL EVERY 5 MIN PRN
Qty: 25 TABLET | Refills: 3 | Status: SHIPPED | OUTPATIENT
Start: 2019-10-04 | End: 2021-09-02

## 2019-10-04 RX ORDER — METOPROLOL SUCCINATE 50 MG/1
50 TABLET, EXTENDED RELEASE ORAL DAILY
Qty: 90 TABLET | Refills: 3 | Status: SHIPPED | OUTPATIENT
Start: 2019-10-04 | End: 2020-07-28

## 2019-10-04 RX ORDER — LOSARTAN POTASSIUM 25 MG/1
25 TABLET ORAL DAILY
Qty: 90 TABLET | Refills: 3 | Status: SHIPPED | OUTPATIENT
Start: 2019-10-04 | End: 2020-08-18

## 2019-10-04 RX ORDER — ROSUVASTATIN CALCIUM 40 MG/1
40 TABLET, COATED ORAL DAILY
Qty: 90 TABLET | Refills: 3 | Status: SHIPPED | OUTPATIENT
Start: 2019-10-04 | End: 2020-07-28

## 2019-10-04 ASSESSMENT — MIFFLIN-ST. JEOR: SCORE: 1805.29
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
## 2019-10-04 NOTE — PATIENT INSTRUCTIONS
Today's Recommendations    1. Will discuss with Dr. Corley regarding full strength vs baby aspirin  2. Continue all other medications without changes.  3. Please follow up with Dr. Corley in 1 year.    Please send a goAct message or call [phone] with questions or concerns.     Scheduling number [phone].

## 2019-10-07 ENCOUNTER — TELEPHONE (OUTPATIENT)
Dept: CARDIOLOGY | Facility: CLINIC | Age: 64
End: 2019-10-07

## 2019-10-07 NOTE — TELEPHONE ENCOUNTER
RN called patient with TYREL Elizabeth and Dr. Corley's recommendation regarding ASA 81mg. Patient verbalized understanding and has no further questions. RN updated patient's med list to reflect changes.         ----- Message from ROWAN Vail CNP sent at 10/7/2019  7:36 AM CDT -----  Please update the patient that I spoke to Dr. Corley and he said 81 mg daily of aspirin is preferred over 325 mg daily of aspirin.     ROWAN Guo CNP  ----- Message -----  From: Abbe Corley MD  Sent: 10/4/2019   6:49 PM CDT  To: ROWAN Vail CNP    Yes 81 Is enough  ----- Message -----  From: Elizabeth Richardson APRN CNP  Sent: 10/4/2019   2:19 PM CDT  To: MD Dr. Jory Cedillo-    The patient states that he previous had a conversation that he should be on a full-strength aspirin versus a baby aspirin.  He knows questioning whether he can decrease to 81 mg daily.  Appreciate your help. Are you ok with this change?     ROWAN Guo CNP

## 2019-10-16 LAB
MDC_IDC_MSMT_BATTERY_STATUS: NORMAL
MDC_IDC_PG_IMPLANT_DTM: NORMAL
MDC_IDC_PG_MFG: NORMAL
MDC_IDC_PG_MODEL: NORMAL
MDC_IDC_PG_SERIAL: NORMAL
MDC_IDC_PG_TYPE: NORMAL
MDC_IDC_SESS_CLINIC_NAME: NORMAL
MDC_IDC_SESS_DTM: NORMAL
MDC_IDC_SESS_TYPE: NORMAL
MDC_IDC_SET_ZONE_DETECTION_INTERVAL: 2000 MS
MDC_IDC_SET_ZONE_DETECTION_INTERVAL: 3000 MS
MDC_IDC_SET_ZONE_DETECTION_INTERVAL: 350 MS
MDC_IDC_SET_ZONE_TYPE: NORMAL
MDC_IDC_STAT_AT_BURDEN_PERCENT: 0 %
MDC_IDC_STAT_AT_DTM_END: NORMAL
MDC_IDC_STAT_AT_DTM_START: NORMAL
MDC_IDC_STAT_EPISODE_RECENT_COUNT: 0
MDC_IDC_STAT_EPISODE_RECENT_COUNT: 1
MDC_IDC_STAT_EPISODE_RECENT_COUNT_DTM_END: NORMAL
MDC_IDC_STAT_EPISODE_RECENT_COUNT_DTM_START: NORMAL
MDC_IDC_STAT_EPISODE_TOTAL_COUNT: 0
MDC_IDC_STAT_EPISODE_TOTAL_COUNT: 8
MDC_IDC_STAT_EPISODE_TOTAL_COUNT_DTM_END: NORMAL
MDC_IDC_STAT_EPISODE_TOTAL_COUNT_DTM_START: NORMAL
MDC_IDC_STAT_EPISODE_TYPE: NORMAL

## 2019-12-04 ENCOUNTER — OFFICE VISIT (OUTPATIENT)
Dept: FAMILY MEDICINE | Facility: CLINIC | Age: 64
End: 2019-12-04
Payer: COMMERCIAL

## 2019-12-04 VITALS
SYSTOLIC BLOOD PRESSURE: 138 MMHG | HEART RATE: 52 BPM | DIASTOLIC BLOOD PRESSURE: 71 MMHG | OXYGEN SATURATION: 98 % | TEMPERATURE: 97 F | HEIGHT: 72 IN | WEIGHT: 210 LBS | BODY MASS INDEX: 28.44 KG/M2

## 2019-12-04 DIAGNOSIS — S22.000D COMPRESSION FRACTURE OF THORACIC VERTEBRA WITH ROUTINE HEALING, UNSPECIFIED THORACIC VERTEBRAL LEVEL, SUBSEQUENT ENCOUNTER: Primary | ICD-10-CM

## 2019-12-04 PROCEDURE — 36415 COLL VENOUS BLD VENIPUNCTURE: CPT | Performed by: INTERNAL MEDICINE

## 2019-12-04 PROCEDURE — G0103 PSA SCREENING: HCPCS | Performed by: INTERNAL MEDICINE

## 2019-12-04 PROCEDURE — 99213 OFFICE O/P EST LOW 20 MIN: CPT | Performed by: INTERNAL MEDICINE

## 2019-12-04 RX ORDER — AMOXICILLIN 500 MG
2000 CAPSULE ORAL DAILY
COMMUNITY
End: 2020-08-18

## 2019-12-04 ASSESSMENT — MIFFLIN-ST. JEOR: SCORE: 1782.61

## 2019-12-04 NOTE — PATIENT INSTRUCTIONS
(S22.000D) Compression fracture of thoracic vertebra with routine healing, unspecified thoracic vertebral level, subsequent encounter  (primary encounter diagnosis)  Comment: We will check MRI thoracic and lumbar spine.  You'll have to clear this with cardiology team to ensure that you have no magnetic implanted metal in your body.  Also, check PSA today.  [facility] phone #[phone]   Plan: MR Thoracic Spine w/o & w Contrast, MR Lumbar         Spine w/o & w Contrast, PSA, screen

## 2019-12-04 NOTE — PROGRESS NOTES
"[facility]  CLINIC PROGRESS NOTE    Subjective:  Compression fracture thoracic spine   Manan Bryant did have recent X-ray findings at [facility] showing multiple thoracic compression fracture.   He has no recent history of trauma.  He has no history of malignancy and no history or suspicion for osteoporosis.    Past medical history, medications, allergies, social history, family history reviewed and updated in EPIC as of 12/4/2019 .    ROS  CONSTITUTIONAL: no fatigue, no unexpected change in weight  SKIN: no worrisome rashes, no worrisome moles, no worrisome lesions  EYES: no acute vision problems or changes  ENT: no ear problems, no mouth problems, no throat problems  RESP: no significant cough, no shortness of breath  CV: no chest pain, no palpitations, no new or worsening peripheral edema  GI: no nausea, no vomiting, no constipation, no diarrhea  : no frequency, no dysuria, no hematuria  MS: no claudication, no myalgias, no joint aches  PSYCHIATRIC: no changes in mood or affect      Objective:  Vitals  /71 (BP Location: Left arm, Patient Position: Chair, Cuff Size: Adult Large)   Pulse 52   Temp 97  F (36.1  C) (Oral)   Ht 1.816 m (5' 11.5\")   Wt 95.3 kg (210 lb)   SpO2 98%   BMI 28.88 kg/m    GEN: Alert Oriented x3 NAD  HEENT: Atraumatic, normocephalic, neck supple  EXT:  , no edema bilateral lower extremities  NEURO: Gait and station deferred, No focal neurologic deficits  PSYCH: Mood good, affect mood congruent    No images are attached to the encounter.    No results found for this or any previous visit (from the past 24 hour(s)).    Assessment/Plan:  Patient Instructions   (S22.000D) Compression fracture of thoracic vertebra with routine healing, unspecified thoracic vertebral level, subsequent encounter  (primary encounter diagnosis)  Comment: We will check MRI thoracic and lumbar spine.  You'll have to clear this with cardiology team to ensure that you have no magnetic " implanted metal in your body.  Also, check PSA today.  [facility] phone #[phone]   Plan: MR Thoracic Spine w/o & w Contrast, MR Lumbar         Spine w/o & w Contrast, PSA, screen               Follow up pending results    15 minutes spent with patient.  Over 50% of time counseling     Disclaimer: This note consists of symbols derived from keyboarding, dictation and/or voice recognition software. As a result, there may be errors in the script that have gone undetected. Please consider this when interpreting information found in this chart.    Ric Villela MD  [phone]

## 2019-12-05 LAB — PSA SERPL-ACNC: 0.63 UG/L (ref 0–4)

## 2019-12-06 NOTE — RESULT ENCOUNTER NOTE
Ismael Hinkle,    I have had the opportunity to review your recent results and an interpretation is as follows:  Your follow up PSA again returned stable.  We will await the results of your upcoming MRI.     Sincerely,  Ric Villela MD

## 2019-12-16 ENCOUNTER — HOSPITAL ENCOUNTER (OUTPATIENT)
Dept: MRI IMAGING | Facility: CLINIC | Age: 64
End: 2019-12-16
Attending: INTERNAL MEDICINE
Payer: COMMERCIAL

## 2019-12-16 ENCOUNTER — HOSPITAL ENCOUNTER (OUTPATIENT)
Dept: MRI IMAGING | Facility: CLINIC | Age: 64
Discharge: HOME | End: 2019-12-16
Attending: INTERNAL MEDICINE | Admitting: INTERNAL MEDICINE
Payer: COMMERCIAL

## 2019-12-16 DIAGNOSIS — S22.000D COMPRESSION FRACTURE OF THORACIC VERTEBRA WITH ROUTINE HEALING, UNSPECIFIED THORACIC VERTEBRAL LEVEL, SUBSEQUENT ENCOUNTER: ICD-10-CM

## 2019-12-16 PROCEDURE — 72146 MRI CHEST SPINE W/O DYE: CPT

## 2019-12-16 PROCEDURE — 72148 MRI LUMBAR SPINE W/O DYE: CPT

## 2019-12-21 NOTE — RESULT ENCOUNTER NOTE
Ismael Hinkle,    I have had the opportunity to review your recent results and an interpretation is as follows:  Your MRI does confirm lumbar and thoracic degenerative disc disease with mild-moderate spinal stenosis, but no evidence of fracture    We can follow up to review this in the next few weeks to review the findings and your current symptoms    Sincerely,  Ric Villela MD

## 2019-12-21 NOTE — RESULT ENCOUNTER NOTE
Ismael Hinkle,    I have had the opportunity to review your recent results and an interpretation is as follows:  Your MRI does confirm lumbar and thoracic degenerative disc disease with mild-moderate spinal stenosis, but no evidence of fracture    Sincerely,  Ric Villela MD

## 2020-01-15 ENCOUNTER — ANCILLARY PROCEDURE (OUTPATIENT)
Dept: CARDIOLOGY | Facility: CLINIC | Age: 65
End: 2020-01-15
Attending: INTERNAL MEDICINE

## 2020-01-15 DIAGNOSIS — Z45.09 ENCOUNTER FOR LOOP RECORDER CHECK: Primary | ICD-10-CM

## 2020-01-15 DIAGNOSIS — R55 SYNCOPE: ICD-10-CM

## 2020-01-15 PROCEDURE — 93298 REM INTERROG DEV EVAL SCRMS: CPT | Performed by: INTERNAL MEDICINE

## 2020-01-15 PROCEDURE — G2066 INTER DEVC REMOTE 30D: HCPCS | Performed by: INTERNAL MEDICINE

## 2020-01-20 LAB
MDC_IDC_MSMT_BATTERY_STATUS: NORMAL
MDC_IDC_PG_IMPLANT_DTM: NORMAL
MDC_IDC_PG_MFG: NORMAL
MDC_IDC_PG_MODEL: NORMAL
MDC_IDC_PG_SERIAL: NORMAL
MDC_IDC_PG_TYPE: NORMAL
MDC_IDC_SESS_CLINIC_NAME: NORMAL
MDC_IDC_SESS_DTM: NORMAL
MDC_IDC_SESS_TYPE: NORMAL
MDC_IDC_SET_ZONE_DETECTION_INTERVAL: 2000 MS
MDC_IDC_SET_ZONE_DETECTION_INTERVAL: 3000 MS
MDC_IDC_SET_ZONE_DETECTION_INTERVAL: 350 MS
MDC_IDC_SET_ZONE_TYPE: NORMAL
MDC_IDC_STAT_AT_BURDEN_PERCENT: 0 %
MDC_IDC_STAT_AT_DTM_END: NORMAL
MDC_IDC_STAT_AT_DTM_START: NORMAL
MDC_IDC_STAT_EPISODE_RECENT_COUNT: 0
MDC_IDC_STAT_EPISODE_RECENT_COUNT_DTM_END: NORMAL
MDC_IDC_STAT_EPISODE_RECENT_COUNT_DTM_START: NORMAL
MDC_IDC_STAT_EPISODE_TOTAL_COUNT: 0
MDC_IDC_STAT_EPISODE_TOTAL_COUNT: 8
MDC_IDC_STAT_EPISODE_TOTAL_COUNT_DTM_END: NORMAL
MDC_IDC_STAT_EPISODE_TOTAL_COUNT_DTM_START: NORMAL
MDC_IDC_STAT_EPISODE_TYPE: NORMAL

## 2020-02-19 ENCOUNTER — TRANSFERRED RECORDS (OUTPATIENT)
Dept: HEALTH INFORMATION MANAGEMENT | Facility: CLINIC | Age: 65
End: 2020-02-19

## 2020-03-13 ENCOUNTER — E-VISIT (OUTPATIENT)
Dept: FAMILY MEDICINE | Facility: CLINIC | Age: 65
End: 2020-03-13
Payer: COMMERCIAL

## 2020-03-13 DIAGNOSIS — J01.01 ACUTE RECURRENT MAXILLARY SINUSITIS: Primary | ICD-10-CM

## 2020-03-13 PROCEDURE — 99421 OL DIG E/M SVC 5-10 MIN: CPT | Performed by: INTERNAL MEDICINE

## 2020-03-13 RX ORDER — AMOXICILLIN 875 MG
875 TABLET ORAL 2 TIMES DAILY
Qty: 20 TABLET | Refills: 0 | Status: SHIPPED | OUTPATIENT
Start: 2020-03-13 | End: 2020-10-27

## 2020-04-15 ENCOUNTER — ANCILLARY PROCEDURE (OUTPATIENT)
Dept: CARDIOLOGY | Facility: CLINIC | Age: 65
End: 2020-04-15
Attending: INTERNAL MEDICINE
Payer: COMMERCIAL

## 2020-04-15 DIAGNOSIS — R55 SYNCOPE: ICD-10-CM

## 2020-04-15 DIAGNOSIS — Z45.09 ENCOUNTER FOR LOOP RECORDER CHECK: ICD-10-CM

## 2020-04-15 PROCEDURE — 93297 REM INTERROG DEV EVAL ICPMS: CPT | Performed by: INTERNAL MEDICINE

## 2020-04-15 PROCEDURE — G2066 INTER DEVC REMOTE 30D: HCPCS | Performed by: INTERNAL MEDICINE

## 2020-04-16 ENCOUNTER — VIRTUAL VISIT (OUTPATIENT)
Dept: FAMILY MEDICINE | Facility: CLINIC | Age: 65
End: 2020-04-16
Payer: COMMERCIAL
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
## 2020-04-16 DIAGNOSIS — S30.860A TICK BITE OF BACK, INITIAL ENCOUNTER: Primary | ICD-10-CM

## 2020-04-16 LAB
MDC_IDC_MSMT_BATTERY_DTM: NORMAL
MDC_IDC_MSMT_BATTERY_STATUS: NORMAL
MDC_IDC_PG_IMPLANT_DTM: NORMAL
MDC_IDC_PG_MFG: NORMAL
MDC_IDC_PG_MODEL: NORMAL
MDC_IDC_PG_SERIAL: NORMAL
MDC_IDC_PG_TYPE: NORMAL
MDC_IDC_SESS_CLINIC_NAME: NORMAL
MDC_IDC_SESS_DTM: NORMAL
MDC_IDC_SESS_TYPE: NORMAL
MDC_IDC_SET_ZONE_DETECTION_INTERVAL: 2000 MS
MDC_IDC_SET_ZONE_DETECTION_INTERVAL: 3000 MS
MDC_IDC_SET_ZONE_DETECTION_INTERVAL: 350 MS
MDC_IDC_SET_ZONE_TYPE: NORMAL
MDC_IDC_STAT_AT_BURDEN_PERCENT: 0 %
MDC_IDC_STAT_AT_DTM_END: NORMAL
MDC_IDC_STAT_AT_DTM_START: NORMAL
MDC_IDC_STAT_EPISODE_RECENT_COUNT: 0
MDC_IDC_STAT_EPISODE_RECENT_COUNT_DTM_END: NORMAL
MDC_IDC_STAT_EPISODE_RECENT_COUNT_DTM_START: NORMAL
MDC_IDC_STAT_EPISODE_TOTAL_COUNT: 0
MDC_IDC_STAT_EPISODE_TOTAL_COUNT: 8
MDC_IDC_STAT_EPISODE_TOTAL_COUNT_DTM_END: NORMAL
MDC_IDC_STAT_EPISODE_TOTAL_COUNT_DTM_START: NORMAL
MDC_IDC_STAT_EPISODE_TYPE: NORMAL

## 2020-04-16 PROCEDURE — 99213 OFFICE O/P EST LOW 20 MIN: CPT | Mod: TEL | Performed by: NURSE PRACTITIONER

## 2020-04-16 RX ORDER — DOXYCYCLINE 100 MG/1
100 CAPSULE ORAL 2 TIMES DAILY
Qty: 28 CAPSULE | Refills: 0 | Status: SHIPPED | OUTPATIENT
Start: 2020-04-16 | End: 2020-08-18

## 2020-04-16 NOTE — PROGRESS NOTES
"Manan Bryant is a 64 year old male who is being evaluated via a billable telephone visit.      The patient has been notified of following:     \"This telephone visit will be conducted via a call between you and your physician/provider. We have found that certain health care needs can be provided without the need for a physical exam.  This service lets us provide the care you need with a short phone conversation.  If a prescription is necessary we can send it directly to your pharmacy.  If lab work is needed we can place an order for that and you can then stop by our lab to have the test done at a later time.    Telephone visits are billed at different rates depending on your insurance coverage. During this emergency period, for some insurers they may be billed the same as an in-person visit.  Please reach out to your insurance provider with any questions.    If during the course of the call the physician/provider feels a telephone visit is not appropriate, you will not be charged for this service.\"    Patient has given verbal consent for Telephone visit?  Yes    How would you like to obtain your AVS? no    Subjective     Manan Bryant is a 64 year old male who presents to clinic today for the following health issues:    Tick bite      Duration: Friday or sat -about a week ago [place], discovered imbedded tick in right posterior shoulder today and \"dug \" it out.  He identifies it as a deer tick.     He has had no rash , fever, chilling , body or joint pain.      In past his immediate family members have developed Lyme disease.  He has never had it ; has had prophylaxis multiple times.  Sometimes the 2 dose treatment and on occasion 2 weeks      Description (location/character/radiation): right shoulder, back    Intensity: NA    Accompanying signs and symptoms: redness    History (similar episodes/previous evaluation): yes    Precipitating or alleviating factors: " None    Therapies tried and outcome: none       Patient Active Problem List   Diagnosis     History of acute anterior wall MI     Coronary artery disease involving native coronary artery of native heart without angina pectoris     Hyperlipidemia LDL goal <70     PVC (premature ventricular contraction)     Family history of early CAD     IFG (impaired fasting glucose)     SVT (supraventricular tachycardia) (H)     VT (ventricular tachycardia) (H)     Paroxysmal supraventricular tachycardia (H)     HDL deficiency     NAFLD (nonalcoholic fatty liver disease)     Esophageal stricture     Past Surgical History:   Procedure Laterality Date     STENT, CORONARY, EMEKA  8/2010    LAD       Social History     Tobacco Use     Smoking status: Never Smoker     Smokeless tobacco: Never Used   Substance Use Topics     Alcohol use: No     Alcohol/week: 0.0 standard drinks     Family History   Problem Relation Age of Onset     Coronary Artery Disease Father      Myocardial Infarction Father      Coronary Artery Disease Brother      Myocardial Infarction Brother      Heart Surgery Brother         bypass     Coronary Artery Disease Paternal Grandfather      Myocardial Infarction Paternal Grandfather      Sleep Apnea Sister      Family History Negative Mother      Family History Negative Maternal Grandmother      Heart Failure Maternal Grandfather      Family History Negative Paternal Grandmother      Myocardial Infarction Brother      Coronary Artery Disease Brother      Heart Surgery Brother         bypass         Current Outpatient Medications   Medication Sig Dispense Refill     amoxicillin (AMOXIL) 875 MG tablet Take 1 tablet (875 mg) by mouth 2 times daily 20 tablet 0     ASPIRIN PO Take 81 mg by mouth daily       Cholecalciferol (VITAMIN D PO) Take 1,000 mg by mouth daily        doxycycline hyclate (VIBRAMYCIN) 100 MG capsule Take 1 capsule (100 mg) by mouth 2 times daily 28 capsule 0     losartan (COZAAR) 25 MG tablet Take 1  tablet (25 mg) by mouth daily 90 tablet 3     metoprolol succinate ER (TOPROL-XL) 50 MG 24 hr tablet Take 1 tablet (50 mg) by mouth daily 90 tablet 3     Multiple Vitamin (MULTI-VITAMIN PO) Take by mouth daily        nitroGLYcerin (NITROSTAT) 0.4 MG sublingual tablet Place 1 tablet (0.4 mg) under the tongue every 5 minutes as needed for chest pain 25 tablet 3     Omega-3 Fatty Acids (FISH OIL) 1200 MG capsule Take 2,000 mg by mouth daily       omeprazole (PRILOSEC) 20 MG DR capsule TAKE 1 CAPSULE(20 MG) BY MOUTH DAILY 90 capsule 3     rosuvastatin (CRESTOR) 40 MG tablet Take 1 tablet (40 mg) by mouth daily 90 tablet 3     No Known Allergies    Reviewed and updated as needed this visit by Provider         Review of Systems   ROS COMP: Constitutional, HEENT, cardiovascular, pulmonary, gi and gu systems are negative, except as otherwise noted.       Objective   Reported vitals:  There were no vitals taken for this visit. due to this being covid mandated shelter in home virtual  telephone visit     PSYCH: Alert and oriented times 3; coherent speech, normal   rate and volume, able to articulate logical thoughts, able   to abstract reason, no tangential thoughts, no hallucinations   or delusions  His affect is normal and pleasant  RESP: No cough, no audible wheezing, able to talk in full sentences  Remainder of exam unable to be completed due to telephone visits  SKIN:  A my chart photo of the site of the tick bite has appearance of a punch biopsy, round , 3-4 mm deep , mild inflammation and no infection  Diagnostic Test Results:  Labs reviewed in Epic        Assessment/Plan:  1. Tick bite of back, initial encounter    - doxycycline hyclate (VIBRAMYCIN) 100 MG capsule; Take 1 capsule (100 mg) by mouth 2 times daily  Dispense: 28 capsule; Refill: 0    He will follow up by phone if he develops symptoms of Lyme disease which are reviewed with him today    Phone call duration: 8  minutes    ROWAN Jensen CNP

## 2020-07-27 DIAGNOSIS — E78.5 HYPERLIPIDEMIA LDL GOAL <70: Chronic | ICD-10-CM

## 2020-07-27 DIAGNOSIS — I25.10 CORONARY ARTERY DISEASE INVOLVING NATIVE CORONARY ARTERY OF NATIVE HEART WITHOUT ANGINA PECTORIS: Chronic | ICD-10-CM

## 2020-07-28 RX ORDER — ROSUVASTATIN CALCIUM 40 MG/1
TABLET, COATED ORAL
Qty: 90 TABLET | Refills: 3 | Status: SHIPPED | OUTPATIENT
Start: 2020-07-28 | End: 2020-08-18

## 2020-07-28 RX ORDER — METOPROLOL SUCCINATE 50 MG/1
TABLET, EXTENDED RELEASE ORAL
Qty: 90 TABLET | Refills: 3 | Status: SHIPPED | OUTPATIENT
Start: 2020-07-28 | End: 2020-08-18

## 2020-07-28 NOTE — TELEPHONE ENCOUNTER
Routing refill request to provider for review/approval because:  [facility]- agree to refill?     Pended       Please review and authorize if appropriate,     Thank you,   Marcy SMART RN

## 2020-08-06 ENCOUNTER — PRE VISIT (OUTPATIENT)
Dept: CARDIOLOGY | Facility: CLINIC | Age: 65
End: 2020-08-06

## 2020-08-06 ENCOUNTER — TELEPHONE (OUTPATIENT)
Dept: CARDIOLOGY | Facility: CLINIC | Age: 65
End: 2020-08-06

## 2020-08-06 DIAGNOSIS — R06.09 DYSPNEA ON EXERTION: Primary | ICD-10-CM

## 2020-08-06 NOTE — TELEPHONE ENCOUNTER
Voicemail received from patient calling to report increased dyspnea and feelings of being deconditioned. He states it is not urgent but he is wondering if he should see an TYREL.     Spoke to patient, states he has become more winded going up a flight of stairs than previous. He does feel more deconditioned overall. He had a stress echo at [facility] Nov/Dec of 2019 which he reports was negative. He denies chest pain or anginal symptoms. He does report a 10lb weight gain but relates it to dietary indescretion. No swelling. He denies lightheadedness/dizziness. Palpitations are present but not increased from normal. Offered patient TYREL visit to discuss, pt agreeable to plan. Message to scheduling to arrange.

## 2020-08-14 ENCOUNTER — TELEPHONE (OUTPATIENT)
Dept: CARDIOLOGY | Facility: CLINIC | Age: 65
End: 2020-08-14

## 2020-08-14 ENCOUNTER — MYC MEDICAL ADVICE (OUTPATIENT)
Dept: FAMILY MEDICINE | Facility: CLINIC | Age: 65
End: 2020-08-14

## 2020-08-14 DIAGNOSIS — E78.6 HDL DEFICIENCY: Chronic | ICD-10-CM

## 2020-08-14 DIAGNOSIS — I25.10 CORONARY ARTERY DISEASE INVOLVING NATIVE CORONARY ARTERY OF NATIVE HEART WITHOUT ANGINA PECTORIS: Chronic | ICD-10-CM

## 2020-08-14 DIAGNOSIS — E78.5 HYPERLIPIDEMIA LDL GOAL <70: Primary | Chronic | ICD-10-CM

## 2020-08-14 DIAGNOSIS — R73.01 IFG (IMPAIRED FASTING GLUCOSE): Primary | ICD-10-CM

## 2020-08-14 NOTE — TELEPHONE ENCOUNTER
Dr Villela ,    Please see [employer] message and reply to patient with advise, or route advise back to triage for follow up with patient.      Pended A1C lab order .  Please sign if OK        Thank you,  Melanie CONWAY RN,BSN

## 2020-08-17 ENCOUNTER — TELEPHONE (OUTPATIENT)
Dept: CARDIOLOGY | Facility: CLINIC | Age: 65
End: 2020-08-17

## 2020-08-17 DIAGNOSIS — R73.01 IFG (IMPAIRED FASTING GLUCOSE): ICD-10-CM

## 2020-08-17 DIAGNOSIS — E78.6 HDL DEFICIENCY: Chronic | ICD-10-CM

## 2020-08-17 DIAGNOSIS — I25.10 CORONARY ARTERY DISEASE INVOLVING NATIVE CORONARY ARTERY OF NATIVE HEART WITHOUT ANGINA PECTORIS: Chronic | ICD-10-CM

## 2020-08-17 DIAGNOSIS — E78.5 HYPERLIPIDEMIA LDL GOAL <70: Chronic | ICD-10-CM

## 2020-08-17 LAB
ALT SERPL W P-5'-P-CCNC: 33 U/L (ref 0–70)
ANION GAP SERPL CALCULATED.3IONS-SCNC: 7 MMOL/L (ref 3–14)
BUN SERPL-MCNC: 17 MG/DL (ref 7–30)
CALCIUM SERPL-MCNC: 9.4 MG/DL (ref 8.5–10.1)
CHLORIDE SERPL-SCNC: 108 MMOL/L (ref 94–109)
CHOLEST SERPL-MCNC: 114 MG/DL
CO2 SERPL-SCNC: 25 MMOL/L (ref 20–32)
CREAT SERPL-MCNC: 1.21 MG/DL (ref 0.66–1.25)
GFR SERPL CREATININE-BSD FRML MDRD: 63 ML/MIN/{1.73_M2}
GLUCOSE SERPL-MCNC: 103 MG/DL (ref 70–99)
HBA1C MFR BLD: 5.7 % (ref 0–5.6)
HDLC SERPL-MCNC: 38 MG/DL
LDLC SERPL CALC-MCNC: 54 MG/DL
NONHDLC SERPL-MCNC: 76 MG/DL
POTASSIUM SERPL-SCNC: 4.1 MMOL/L (ref 3.4–5.3)
SODIUM SERPL-SCNC: 140 MMOL/L (ref 133–144)
TRIGL SERPL-MCNC: 112 MG/DL

## 2020-08-17 PROCEDURE — 80048 BASIC METABOLIC PNL TOTAL CA: CPT | Performed by: INTERNAL MEDICINE

## 2020-08-17 PROCEDURE — 36415 COLL VENOUS BLD VENIPUNCTURE: CPT | Performed by: INTERNAL MEDICINE

## 2020-08-17 PROCEDURE — 84460 ALANINE AMINO (ALT) (SGPT): CPT | Performed by: INTERNAL MEDICINE

## 2020-08-17 PROCEDURE — 80061 LIPID PANEL: CPT | Performed by: INTERNAL MEDICINE

## 2020-08-17 PROCEDURE — 83036 HEMOGLOBIN GLYCOSYLATED A1C: CPT | Performed by: INTERNAL MEDICINE

## 2020-08-17 NOTE — TELEPHONE ENCOUNTER

## 2020-08-18 ENCOUNTER — OFFICE VISIT (OUTPATIENT)
Dept: CARDIOLOGY | Facility: CLINIC | Age: 65
End: 2020-08-18
Attending: INTERNAL MEDICINE
Payer: COMMERCIAL

## 2020-08-18 VITALS
DIASTOLIC BLOOD PRESSURE: 83 MMHG | BODY MASS INDEX: 29.96 KG/M2 | TEMPERATURE: 96.6 F | SYSTOLIC BLOOD PRESSURE: 142 MMHG | HEIGHT: 72 IN | HEART RATE: 59 BPM | WEIGHT: 221.2 LBS

## 2020-08-18 DIAGNOSIS — I49.3 PVC (PREMATURE VENTRICULAR CONTRACTION): ICD-10-CM

## 2020-08-18 DIAGNOSIS — E78.6 HDL DEFICIENCY: Chronic | ICD-10-CM

## 2020-08-18 DIAGNOSIS — E78.5 HYPERLIPIDEMIA LDL GOAL <70: Primary | Chronic | ICD-10-CM

## 2020-08-18 DIAGNOSIS — I25.10 CORONARY ARTERY DISEASE INVOLVING NATIVE CORONARY ARTERY OF NATIVE HEART WITHOUT ANGINA PECTORIS: Chronic | ICD-10-CM

## 2020-08-18 DIAGNOSIS — I10 BENIGN ESSENTIAL HYPERTENSION: ICD-10-CM

## 2020-08-18 PROCEDURE — 99214 OFFICE O/P EST MOD 30 MIN: CPT | Performed by: INTERNAL MEDICINE

## 2020-08-18 RX ORDER — ROSUVASTATIN CALCIUM 40 MG/1
40 TABLET, COATED ORAL DAILY
Qty: 30 TABLET | Refills: 11 | Status: SHIPPED | OUTPATIENT
Start: 2020-08-18 | End: 2021-08-03

## 2020-08-18 RX ORDER — LOSARTAN POTASSIUM 50 MG/1
50 TABLET ORAL DAILY
Qty: 30 TABLET | Refills: 11 | Status: SHIPPED | OUTPATIENT
Start: 2020-08-18 | End: 2020-10-27

## 2020-08-18 RX ORDER — METOPROLOL SUCCINATE 50 MG/1
50 TABLET, EXTENDED RELEASE ORAL DAILY
Qty: 30 TABLET | Refills: 11 | Status: SHIPPED | OUTPATIENT
Start: 2020-08-18 | End: 2021-08-02

## 2020-08-18 ASSESSMENT — MIFFLIN-ST. JEOR: SCORE: 1833.42

## 2020-08-18 NOTE — LETTER
8/18/2020    Ric Villela MD, MD  [address]  [facility] [address]    RE: Manan Bryant       Dear Colleague,    I had the pleasure of seeing Manan Bryant in the [facility].    HPI and Plan:   HISTORY OF PRESENT ILLNESS:  Mr. Bryant is a very nice 64-year-old gentleman who I originally met in 2010 when he presented with an acute inferior wall myocardial infarction.  We performed successful aspiration thrombectomy and stenting of an occluded left anterior descending artery.  Troponin rise was only 7.  His sister-in-law is Dr. Shavon Langley.  Post-infarct in Cardiac Rehab, he was noted to have nonsustained runs of ventricular tachycardia and was seen by Dr. Wooten.  Ejection fraction was 50%.  Subsequent Holter monitors demonstrated no significant arrhythmias other than PVCs which date back many years and appear to be effectively treated by beta blockers.      Manan initially had problems with beta blocker side effects including fatigue and tiredness. He noted if he missed his beta blocker, he had more energy and spark; however, his PVCs would increase.  A trial of Bystolic resulted in no significant improvement.  He also has cool extremities with cool hands and cool feet.       A spell in 2016 led to another trip to the [facility] Lab where he was found to be noninducible and a Reveal was implanted.      Due to a sudden decrease in exercise tolerance he underwent a stress echocardiogram and December 2019 for which he was able to exercise 9 minutes, he states he clearly could have gone longer.  It had no evidence of ischemia by EKG or echo.  He achieved 87% of his target heart rate and was off beta-blockers.     Work-up of elevated transaminases demonstrated that he had fatty liver.  He changed his diet, exercised and lost 15 pounds of weight    He now returns post Covid and states unfortunately he is gained weight and is not exercising at all as he and his wife are  concerned even with biking they may pass close to other bicyclist.  He states his exercise tolerance was starting to improve but is deteriorated again with the Covid pandemic.  He has no chest arm neck jaw or shoulder discomfort.  No lightheadedness dizziness syncope or near syncope.  He notes no side effects or problems with his current medical regiment.     ASSESSMENT AND PLAN:  Manan appears to be doing quite well from a cardiac standpoint without clinical evidence of ischemia.    This is supported by his stress test of December.     There is no evidence of heart failure or significant arrhythmia.  The EP study is reassuring and interrogation of his Reveal shows no significant ectopy.     I suspect his exercise intolerance is deconditioning and weight gain.  I have encouraged him to get back to bicycling.  May be bicycling and streets and not on heavily travel trails.  Of also encouraged him to get tighter with his diet and lose the weight he is gained.     Blood pressure is high today.  He suspects this is really not whitecoat hypertension.  I have increased his losartan to 50 mg daily I will have him follow-up with my TYREL for blood pressure check and a recheck of a basic metabolic profile in 1 to 2 months.     Fasting lipid profile is excellent with total cholesterol 114, HDL 38, LDL 54 and triglycerides of 112.  This is on rosuvastatin 40.  Liver function test ALT is 33.    Creatinine is 1.2 giving him a GFR of 63.  Electrolytes are normal.     He will continue to follow in our [facility] quarterly.  I will have him see my TYREL in 1 year.  I will see him back in 2 years.  If he should have any problems, I would see him sooner.         DIANDRA JACOBSON MD, [facility]           Orders Placed This Encounter   Procedures     Basic metabolic panel     Lipid Profile     Basic metabolic panel     Follow-Up with Cardiac Advanced Practice Provider     Follow-Up with Cardiac Advanced Practice Provider     Follow-Up with  Cardiologist       Orders Placed This Encounter   Medications     losartan (COZAAR) 50 MG tablet     Sig: Take 1 tablet (50 mg) by mouth daily     Dispense:  30 tablet     Refill:  11     metoprolol succinate ER (TOPROL-XL) 50 MG 24 hr tablet     Sig: Take 1 tablet (50 mg) by mouth daily     Dispense:  30 tablet     Refill:  11     rosuvastatin (CRESTOR) 40 MG tablet     Sig: Take 1 tablet (40 mg) by mouth daily     Dispense:  30 tablet     Refill:  11       Medications Discontinued During This Encounter   Medication Reason     losartan (COZAAR) 25 MG tablet      metoprolol succinate ER (TOPROL-XL) 50 MG 24 hr tablet Reorder     rosuvastatin (CRESTOR) 40 MG tablet Reorder         Encounter Diagnoses   Name Primary?     Coronary artery disease involving native coronary artery of native heart without angina pectoris      Hyperlipidemia LDL goal <70 Yes     HDL deficiency      PVC (premature ventricular contraction)      Benign essential hypertension        CURRENT MEDICATIONS:  Current Outpatient Medications   Medication Sig Dispense Refill     amoxicillin (AMOXIL) 875 MG tablet Take 1 tablet (875 mg) by mouth 2 times daily 20 tablet 0     ASPIRIN PO Take 81 mg by mouth daily       Cholecalciferol (VITAMIN D PO) Take 1,000 mg by mouth daily        losartan (COZAAR) 50 MG tablet Take 1 tablet (50 mg) by mouth daily 30 tablet 11     metoprolol succinate ER (TOPROL-XL) 50 MG 24 hr tablet Take 1 tablet (50 mg) by mouth daily 30 tablet 11     Multiple Vitamin (MULTI-VITAMIN PO) Take by mouth daily        nitroGLYcerin (NITROSTAT) 0.4 MG sublingual tablet Place 1 tablet (0.4 mg) under the tongue every 5 minutes as needed for chest pain 25 tablet 3     omeprazole (PRILOSEC) 20 MG DR capsule TAKE 1 CAPSULE(20 MG) BY MOUTH DAILY 90 capsule 3     rosuvastatin (CRESTOR) 40 MG tablet Take 1 tablet (40 mg) by mouth daily 30 tablet 11     doxycycline hyclate (VIBRAMYCIN) 100 MG capsule Take 1 capsule (100 mg) by mouth 2 times daily  28 capsule 0     Omega-3 Fatty Acids (FISH OIL) 1200 MG capsule Take 2,000 mg by mouth daily         ALLERGIES   No Known Allergies    PAST MEDICAL HISTORY:  Past Medical History:   Diagnosis Date     CAD (coronary artery disease)     cardiac cath 8/5/2010: ELLIOTT to LAD     Family history of early CAD      History of acute anterior wall MI 2010 2010     History of colonic polyps      Mixed hyperlipidemia      Myocardial infarction (H)      Paroxysmal supraventricular tachycardia (H)      PVC (premature ventricular contraction)     Hx ventricular tachycardia during cardiac rehab 2010     SVT (supraventricular tachycardia) (H)        PAST SURGICAL HISTORY:  Past Surgical History:   Procedure Laterality Date     STENT, CORONARY, EMEKA  8/2010    LAD       FAMILY HISTORY:  Family History   Problem Relation Age of Onset     Coronary Artery Disease Father      Myocardial Infarction Father      Coronary Artery Disease Brother      Myocardial Infarction Brother      Heart Surgery Brother         bypass     Coronary Artery Disease Paternal Grandfather      Myocardial Infarction Paternal Grandfather      Sleep Apnea Sister      Family History Negative Mother      Family History Negative Maternal Grandmother      Heart Failure Maternal Grandfather      Family History Negative Paternal Grandmother      Myocardial Infarction Brother      Coronary Artery Disease Brother      Heart Surgery Brother         bypass       SOCIAL HISTORY:  Social History     Socioeconomic History     Marital status:      Spouse name: None     Number of children: None     Years of education: None     Highest education level: None   Occupational History     None   Social Needs     Financial resource strain: None     Food insecurity     Worry: None     Inability: None     Transportation needs     Medical: None     Non-medical: None   Tobacco Use     Smoking status: Never Smoker     Smokeless tobacco: Never Used   Substance and Sexual Activity      "Alcohol use: No     Alcohol/week: 0.0 standard drinks     Drug use: No     Sexual activity: Never   Lifestyle     Physical activity     Days per week: None     Minutes per session: None     Stress: None   Relationships     Social connections     Talks on phone: None     Gets together: None     Attends Gnosticist service: None     Active member of club or organization: None     Attends meetings of clubs or organizations: None     Relationship status: None     Intimate partner violence     Fear of current or ex partner: None     Emotionally abused: None     Physically abused: None     Forced sexual activity: None   Other Topics Concern     Parent/sibling w/ CABG, MI or angioplasty before 65F 55M? Not Asked      Service Not Asked     Blood Transfusions Not Asked     Caffeine Concern No     Comment: decaf: 1-2 cups a day. Diek coke: 4-5 cans a day     Occupational Exposure Not Asked     Hobby Hazards Not Asked     Sleep Concern No     Stress Concern No     Weight Concern No     Special Diet Yes     Comment: limited, heart healthy     Back Care Not Asked     Exercise Yes     Comment: bike outside, 1-3x a week     Bike Helmet Yes     Seat Belt Yes     Self-Exams Not Asked   Social History Narrative    ,    Retired - continues        Review of Systems:  Skin:  Negative       Eyes:  Positive for glasses    ENT:  Negative      Respiratory:  Positive for shortness of breath     Cardiovascular:    Positive for;palpitations    Gastroenterology: Positive for reflux    Genitourinary:  Negative      Musculoskeletal:  Positive for arthritis(Hands, Toes)    Neurologic:  Negative      Psychiatric:  Negative      Heme/Lymph/Imm:  Negative      Endocrine:  Negative        Physical Exam:  Vitals: BP (!) 142/83   Pulse 59   Temp 96.6  F (35.9  C)   Ht 1.816 m (5' 11.5\")   Wt 100.3 kg (221 lb 3.2 oz)   BMI 30.42 kg/m      Constitutional:  cooperative, alert and oriented, well developed, well " nourished, in no acute distress overweight      Skin:  warm and dry to the touch, no apparent skin lesions or masses noted          Head:  normocephalic, no masses or lesions        Eyes:  pupils equal and round, conjunctivae and lids unremarkable, sclera white, no xanthalasma, EOMS intact, no nystagmus        Lymph:      ENT:  no pallor or cyanosis, dentition good        Neck:  carotid pulses are full and equal bilaterally;no carotid bruit        Respiratory:  normal breath sounds, clear to auscultation, normal A-P diameter, normal symmetry, normal respiratory excursion, no use of accessory muscles         Cardiac: regular rhythm;normal S1 and S2;no S3 or S4;no murmurs, gallops or rubs detected                pulses full and equal                                        GI:           Extremities and Muscular Skeletal:  no edema;no spinal abnormalities noted;normal muscle strength and tone              Neurological:  no gross motor deficits        Psych:  affect appropriate, oriented to time, person and place        CC  Abbe Corley MD  [address]  [place] [address]                Thank you for allowing me to participate in the care of your patient.      Sincerely,     Abbe Corley MD     [facility]    cc:   Abbe Corley MD  [address]  [place] [address]

## 2020-08-18 NOTE — LETTER
8/18/2020    Ric Villela MD, MD  [address]  [facility] [address]    RE: Manan Bryant       Dear Colleague,    I had the pleasure of seeing Manan Bryant in the [facility].    HPI and Plan:   HISTORY OF PRESENT ILLNESS:  Mr. Bryant is a very nice 64-year-old gentleman who I originally met in 2010 when he presented with an acute inferior wall myocardial infarction.  We performed successful aspiration thrombectomy and stenting of an occluded left anterior descending artery.  Troponin rise was only 7.  His sister-in-law is Dr. Shavon Langley.  Post-infarct in Cardiac Rehab, he was noted to have nonsustained runs of ventricular tachycardia and was seen by Dr. Wooten.  Ejection fraction was 50%.  Subsequent Holter monitors demonstrated no significant arrhythmias other than PVCs which date back many years and appear to be effectively treated by beta blockers.      Manan initially had problems with beta blocker side effects including fatigue and tiredness. He noted if he missed his beta blocker, he had more energy and spark; however, his PVCs would increase.  A trial of Bystolic resulted in no significant improvement.  He also has cool extremities with cool hands and cool feet.       A spell in 2016 led to another trip to the EP Lab where he was found to be noninducible and a Reveal was implanted.      Due to a sudden decrease in exercise tolerance he underwent a stress echocardiogram and December 2019 for which he was able to exercise 9 minutes, he states he clearly could have gone longer.  It had no evidence of ischemia by EKG or echo.  He achieved 87% of his target heart rate and was off beta-blockers.     Work-up of elevated transaminases demonstrated that he had fatty liver.  He changed his diet, exercised and lost 15 pounds of weight    He now returns post Covid and states unfortunately he is gained weight and is not exercising at all as he and his wife are  concerned even with biking they may pass close to other bicyclist.  He states his exercise tolerance was starting to improve but is deteriorated again with the Covid pandemic.  He has no chest arm neck jaw or shoulder discomfort.  No lightheadedness dizziness syncope or near syncope.  He notes no side effects or problems with his current medical regiment.     ASSESSMENT AND PLAN:  Manan appears to be doing quite well from a cardiac standpoint without clinical evidence of ischemia.    This is supported by his stress test of December.     There is no evidence of heart failure or significant arrhythmia.  The EP study is reassuring and interrogation of his Reveal shows no significant ectopy.     I suspect his exercise intolerance is deconditioning and weight gain.  I have encouraged him to get back to bicycling.  May be bicycling and streets and not on heavily travel trails.  Of also encouraged him to get tighter with his diet and lose the weight he is gained.     Blood pressure is high today.  He suspects this is really not whitecoat hypertension.  I have increased his losartan to 50 mg daily I will have him follow-up with my TYREL for blood pressure check and a recheck of a basic metabolic profile in 1 to 2 months.     Fasting lipid profile is excellent with total cholesterol 114, HDL 38, LDL 54 and triglycerides of 112.  This is on rosuvastatin 40.  Liver function test ALT is 33.    Creatinine is 1.2 giving him a GFR of 63.  Electrolytes are normal.     He will continue to follow in our Device Clinic quarterly.  I will have him see my TYREL in 1 year.  I will see him back in 2 years.  If he should have any problems, I would see him sooner.         DIANDRA JACOBSON MD, [facility]           Orders Placed This Encounter   Procedures     Basic metabolic panel     Lipid Profile     Basic metabolic panel     Follow-Up with Cardiac Advanced Practice Provider     Follow-Up with Cardiac Advanced Practice Provider     Follow-Up with  Cardiologist       Orders Placed This Encounter   Medications     losartan (COZAAR) 50 MG tablet     Sig: Take 1 tablet (50 mg) by mouth daily     Dispense:  30 tablet     Refill:  11     metoprolol succinate ER (TOPROL-XL) 50 MG 24 hr tablet     Sig: Take 1 tablet (50 mg) by mouth daily     Dispense:  30 tablet     Refill:  11     rosuvastatin (CRESTOR) 40 MG tablet     Sig: Take 1 tablet (40 mg) by mouth daily     Dispense:  30 tablet     Refill:  11       Medications Discontinued During This Encounter   Medication Reason     losartan (COZAAR) 25 MG tablet      metoprolol succinate ER (TOPROL-XL) 50 MG 24 hr tablet Reorder     rosuvastatin (CRESTOR) 40 MG tablet Reorder         Encounter Diagnoses   Name Primary?     Coronary artery disease involving native coronary artery of native heart without angina pectoris      Hyperlipidemia LDL goal <70 Yes     HDL deficiency      PVC (premature ventricular contraction)      Benign essential hypertension        CURRENT MEDICATIONS:  Current Outpatient Medications   Medication Sig Dispense Refill     amoxicillin (AMOXIL) 875 MG tablet Take 1 tablet (875 mg) by mouth 2 times daily 20 tablet 0     ASPIRIN PO Take 81 mg by mouth daily       Cholecalciferol (VITAMIN D PO) Take 1,000 mg by mouth daily        losartan (COZAAR) 50 MG tablet Take 1 tablet (50 mg) by mouth daily 30 tablet 11     metoprolol succinate ER (TOPROL-XL) 50 MG 24 hr tablet Take 1 tablet (50 mg) by mouth daily 30 tablet 11     Multiple Vitamin (MULTI-VITAMIN PO) Take by mouth daily        nitroGLYcerin (NITROSTAT) 0.4 MG sublingual tablet Place 1 tablet (0.4 mg) under the tongue every 5 minutes as needed for chest pain 25 tablet 3     omeprazole (PRILOSEC) 20 MG DR capsule TAKE 1 CAPSULE(20 MG) BY MOUTH DAILY 90 capsule 3     rosuvastatin (CRESTOR) 40 MG tablet Take 1 tablet (40 mg) by mouth daily 30 tablet 11     doxycycline hyclate (VIBRAMYCIN) 100 MG capsule Take 1 capsule (100 mg) by mouth 2 times daily  28 capsule 0     Omega-3 Fatty Acids (FISH OIL) 1200 MG capsule Take 2,000 mg by mouth daily         ALLERGIES   No Known Allergies    PAST MEDICAL HISTORY:  Past Medical History:   Diagnosis Date     CAD (coronary artery disease)     cardiac cath 8/5/2010: ELLIOTT to LAD     Family history of early CAD      History of acute anterior wall MI 2010 2010     History of colonic polyps      Mixed hyperlipidemia      Myocardial infarction (H)      Paroxysmal supraventricular tachycardia (H)      PVC (premature ventricular contraction)     Hx ventricular tachycardia during cardiac rehab 2010     SVT (supraventricular tachycardia) (H)        PAST SURGICAL HISTORY:  Past Surgical History:   Procedure Laterality Date     STENT, CORONARY, EMEKA  8/2010    LAD       FAMILY HISTORY:  Family History   Problem Relation Age of Onset     Coronary Artery Disease Father      Myocardial Infarction Father      Coronary Artery Disease Brother      Myocardial Infarction Brother      Heart Surgery Brother         bypass     Coronary Artery Disease Paternal Grandfather      Myocardial Infarction Paternal Grandfather      Sleep Apnea Sister      Family History Negative Mother      Family History Negative Maternal Grandmother      Heart Failure Maternal Grandfather      Family History Negative Paternal Grandmother      Myocardial Infarction Brother      Coronary Artery Disease Brother      Heart Surgery Brother         bypass       SOCIAL HISTORY:  Social History     Socioeconomic History     Marital status:      Spouse name: None     Number of children: None     Years of education: None     Highest education level: None   Occupational History     None   Social Needs     Financial resource strain: None     Food insecurity     Worry: None     Inability: None     Transportation needs     Medical: None     Non-medical: None   Tobacco Use     Smoking status: Never Smoker     Smokeless tobacco: Never Used   Substance and Sexual Activity      "Alcohol use: No     Alcohol/week: 0.0 standard drinks     Drug use: No     Sexual activity: Never   Lifestyle     Physical activity     Days per week: None     Minutes per session: None     Stress: None   Relationships     Social connections     Talks on phone: None     Gets together: None     Attends Nondenominational service: None     Active member of club or organization: None     Attends meetings of clubs or organizations: None     Relationship status: None     Intimate partner violence     Fear of current or ex partner: None     Emotionally abused: None     Physically abused: None     Forced sexual activity: None   Other Topics Concern     Parent/sibling w/ CABG, MI or angioplasty before 65F 55M? Not Asked      Service Not Asked     Blood Transfusions Not Asked     Caffeine Concern No     Comment: decaf: 1-2 cups a day. Diek coke: 4-5 cans a day     Occupational Exposure Not Asked     Hobby Hazards Not Asked     Sleep Concern No     Stress Concern No     Weight Concern No     Special Diet Yes     Comment: limited, heart healthy     Back Care Not Asked     Exercise Yes     Comment: bike outside, 1-3x a week     Bike Helmet Yes     Seat Belt Yes     Self-Exams Not Asked   Social History Narrative    ,    Retired - continues        Review of Systems:  Skin:  Negative       Eyes:  Positive for glasses    ENT:  Negative      Respiratory:  Positive for shortness of breath     Cardiovascular:    Positive for;palpitations    Gastroenterology: Positive for reflux    Genitourinary:  Negative      Musculoskeletal:  Positive for arthritis(Hands, Toes)    Neurologic:  Negative      Psychiatric:  Negative      Heme/Lymph/Imm:  Negative      Endocrine:  Negative        Physical Exam:  Vitals: BP (!) 142/83   Pulse 59   Temp 96.6  F (35.9  C)   Ht 1.816 m (5' 11.5\")   Wt 100.3 kg (221 lb 3.2 oz)   BMI 30.42 kg/m      Constitutional:  cooperative, alert and oriented, well developed, well " nourished, in no acute distress overweight      Skin:  warm and dry to the touch, no apparent skin lesions or masses noted          Head:  normocephalic, no masses or lesions        Eyes:  pupils equal and round, conjunctivae and lids unremarkable, sclera white, no xanthalasma, EOMS intact, no nystagmus        Lymph:      ENT:  no pallor or cyanosis, dentition good        Neck:  carotid pulses are full and equal bilaterally;no carotid bruit        Respiratory:  normal breath sounds, clear to auscultation, normal A-P diameter, normal symmetry, normal respiratory excursion, no use of accessory muscles         Cardiac: regular rhythm;normal S1 and S2;no S3 or S4;no murmurs, gallops or rubs detected                pulses full and equal                                        GI:           Extremities and Muscular Skeletal:  no edema;no spinal abnormalities noted;normal muscle strength and tone              Neurological:  no gross motor deficits        Psych:  affect appropriate, oriented to time, person and place          Thank you for allowing me to participate in the care of your patient.    Sincerely,     Abbe Corley MD     [facility]

## 2020-08-18 NOTE — PROGRESS NOTES
HPI and Plan:   HISTORY OF PRESENT ILLNESS:  Mr. Bryant is a very nice 64-year-old gentleman who I originally met in 2010 when he presented with an acute inferior wall myocardial infarction.  We performed successful aspiration thrombectomy and stenting of an occluded left anterior descending artery.  Troponin rise was only 7.  His sister-in-law is Dr. Shavon Langley.  Post-infarct in Cardiac Rehab, he was noted to have nonsustained runs of ventricular tachycardia and was seen by Dr. Wooten.  Ejection fraction was 50%.  Subsequent Holter monitors demonstrated no significant arrhythmias other than PVCs which date back many years and appear to be effectively treated by beta blockers.      Manan initially had problems with beta blocker side effects including fatigue and tiredness. He noted if he missed his beta blocker, he had more energy and spark; however, his PVCs would increase.  A trial of Bystolic resulted in no significant improvement.  He also has cool extremities with cool hands and cool feet.       A spell in 2016 led to another trip to the EP Lab where he was found to be noninducible and a Reveal was implanted.      Due to a sudden decrease in exercise tolerance he underwent a stress echocardiogram and December 2019 for which he was able to exercise 9 minutes, he states he clearly could have gone longer.  It had no evidence of ischemia by EKG or echo.  He achieved 87% of his target heart rate and was off beta-blockers.     Work-up of elevated transaminases demonstrated that he had fatty liver.  He changed his diet, exercised and lost 15 pounds of weight    He now returns post Covid and states unfortunately he is gained weight and is not exercising at all as he and his wife are concerned even with biking they may pass close to other bicyclist.  He states his exercise tolerance was starting to improve but is deteriorated again with the Covid pandemic.  He has no chest arm neck jaw or shoulder discomfort.  No  lightheadedness dizziness syncope or near syncope.  He notes no side effects or problems with his current medical regiment.     ASSESSMENT AND PLAN:  Manan appears to be doing quite well from a cardiac standpoint without clinical evidence of ischemia.    This is supported by his stress test of December.     There is no evidence of heart failure or significant arrhythmia.  The EP study is reassuring and interrogation of his Reveal shows no significant ectopy.     I suspect his exercise intolerance is deconditioning and weight gain.  I have encouraged him to get back to bicycling.  May be bicycling and streets and not on heavily travel trails.  Of also encouraged him to get tighter with his diet and lose the weight he is gained.     Blood pressure is high today.  He suspects this is really not whitecoat hypertension.  I have increased his losartan to 50 mg daily I will have him follow-up with my TYREL for blood pressure check and a recheck of a basic metabolic profile in 1 to 2 months.     Fasting lipid profile is excellent with total cholesterol 114, HDL 38, LDL 54 and triglycerides of 112.  This is on rosuvastatin 40.  Liver function test ALT is 33.    Creatinine is 1.2 giving him a GFR of 63.  Electrolytes are normal.     He will continue to follow in our Device Clinic quarterly.  I will have him see my TYREL in 1 year.  I will see him back in 2 years.  If he should have any problems, I would see him sooner.         DIANDRA JACOBSON MD, [facility]           Orders Placed This Encounter   Procedures     Basic metabolic panel     Lipid Profile     Basic metabolic panel     Follow-Up with Cardiac Advanced Practice Provider     Follow-Up with Cardiac Advanced Practice Provider     Follow-Up with Cardiologist       Orders Placed This Encounter   Medications     losartan (COZAAR) 50 MG tablet     Sig: Take 1 tablet (50 mg) by mouth daily     Dispense:  30 tablet     Refill:  11     metoprolol succinate ER (TOPROL-XL) 50 MG 24  hr tablet     Sig: Take 1 tablet (50 mg) by mouth daily     Dispense:  30 tablet     Refill:  11     rosuvastatin (CRESTOR) 40 MG tablet     Sig: Take 1 tablet (40 mg) by mouth daily     Dispense:  30 tablet     Refill:  11       Medications Discontinued During This Encounter   Medication Reason     losartan (COZAAR) 25 MG tablet      metoprolol succinate ER (TOPROL-XL) 50 MG 24 hr tablet Reorder     rosuvastatin (CRESTOR) 40 MG tablet Reorder         Encounter Diagnoses   Name Primary?     Coronary artery disease involving native coronary artery of native heart without angina pectoris      Hyperlipidemia LDL goal <70 Yes     HDL deficiency      PVC (premature ventricular contraction)      Benign essential hypertension        CURRENT MEDICATIONS:  Current Outpatient Medications   Medication Sig Dispense Refill     amoxicillin (AMOXIL) 875 MG tablet Take 1 tablet (875 mg) by mouth 2 times daily 20 tablet 0     ASPIRIN PO Take 81 mg by mouth daily       Cholecalciferol (VITAMIN D PO) Take 1,000 mg by mouth daily        losartan (COZAAR) 50 MG tablet Take 1 tablet (50 mg) by mouth daily 30 tablet 11     metoprolol succinate ER (TOPROL-XL) 50 MG 24 hr tablet Take 1 tablet (50 mg) by mouth daily 30 tablet 11     Multiple Vitamin (MULTI-VITAMIN PO) Take by mouth daily        nitroGLYcerin (NITROSTAT) 0.4 MG sublingual tablet Place 1 tablet (0.4 mg) under the tongue every 5 minutes as needed for chest pain 25 tablet 3     omeprazole (PRILOSEC) 20 MG DR capsule TAKE 1 CAPSULE(20 MG) BY MOUTH DAILY 90 capsule 3     rosuvastatin (CRESTOR) 40 MG tablet Take 1 tablet (40 mg) by mouth daily 30 tablet 11     doxycycline hyclate (VIBRAMYCIN) 100 MG capsule Take 1 capsule (100 mg) by mouth 2 times daily 28 capsule 0     Omega-3 Fatty Acids (FISH OIL) 1200 MG capsule Take 2,000 mg by mouth daily         ALLERGIES   No Known Allergies    PAST MEDICAL HISTORY:  Past Medical History:   Diagnosis Date     CAD (coronary artery disease)      cardiac cath 8/5/2010: ELLIOTT to LAD     Family history of early CAD      History of acute anterior wall MI 2010 2010     History of colonic polyps      Mixed hyperlipidemia      Myocardial infarction (H)      Paroxysmal supraventricular tachycardia (H)      PVC (premature ventricular contraction)     Hx ventricular tachycardia during cardiac rehab 2010     SVT (supraventricular tachycardia) (H)        PAST SURGICAL HISTORY:  Past Surgical History:   Procedure Laterality Date     STENT, CORONARY, EMEKA  8/2010    LAD       FAMILY HISTORY:  Family History   Problem Relation Age of Onset     Coronary Artery Disease Father      Myocardial Infarction Father      Coronary Artery Disease Brother      Myocardial Infarction Brother      Heart Surgery Brother         bypass     Coronary Artery Disease Paternal Grandfather      Myocardial Infarction Paternal Grandfather      Sleep Apnea Sister      Family History Negative Mother      Family History Negative Maternal Grandmother      Heart Failure Maternal Grandfather      Family History Negative Paternal Grandmother      Myocardial Infarction Brother      Coronary Artery Disease Brother      Heart Surgery Brother         bypass       SOCIAL HISTORY:  Social History     Socioeconomic History     Marital status:      Spouse name: None     Number of children: None     Years of education: None     Highest education level: None   Occupational History     None   Social Needs     Financial resource strain: None     Food insecurity     Worry: None     Inability: None     Transportation needs     Medical: None     Non-medical: None   Tobacco Use     Smoking status: Never Smoker     Smokeless tobacco: Never Used   Substance and Sexual Activity     Alcohol use: No     Alcohol/week: 0.0 standard drinks     Drug use: No     Sexual activity: Never   Lifestyle     Physical activity     Days per week: None     Minutes per session: None     Stress: None   Relationships     Social  "connections     Talks on phone: None     Gets together: None     Attends Jehovah's witness service: None     Active member of club or organization: None     Attends meetings of clubs or organizations: None     Relationship status: None     Intimate partner violence     Fear of current or ex partner: None     Emotionally abused: None     Physically abused: None     Forced sexual activity: None   Other Topics Concern     Parent/sibling w/ CABG, MI or angioplasty before 65F 55M? Not Asked      Service Not Asked     Blood Transfusions Not Asked     Caffeine Concern No     Comment: decaf: 1-2 cups a day. Diek coke: 4-5 cans a day     Occupational Exposure Not Asked     Hobby Hazards Not Asked     Sleep Concern No     Stress Concern No     Weight Concern No     Special Diet Yes     Comment: limited, heart healthy     Back Care Not Asked     Exercise Yes     Comment: bike outside, 1-3x a week     Bike Helmet Yes     Seat Belt Yes     Self-Exams Not Asked   Social History Narrative    ,    Retired - continues        Review of Systems:  Skin:  Negative       Eyes:  Positive for glasses    ENT:  Negative      Respiratory:  Positive for shortness of breath     Cardiovascular:    Positive for;palpitations    Gastroenterology: Positive for reflux    Genitourinary:  Negative      Musculoskeletal:  Positive for arthritis(Hands, Toes)    Neurologic:  Negative      Psychiatric:  Negative      Heme/Lymph/Imm:  Negative      Endocrine:  Negative        Physical Exam:  Vitals: BP (!) 142/83   Pulse 59   Temp 96.6  F (35.9  C)   Ht 1.816 m (5' 11.5\")   Wt 100.3 kg (221 lb 3.2 oz)   BMI 30.42 kg/m      Constitutional:  cooperative, alert and oriented, well developed, well nourished, in no acute distress overweight      Skin:  warm and dry to the touch, no apparent skin lesions or masses noted          Head:  normocephalic, no masses or lesions        Eyes:  pupils equal and round, conjunctivae and lids " unremarkable, sclera white, no xanthalasma, EOMS intact, no nystagmus        Lymph:      ENT:  no pallor or cyanosis, dentition good        Neck:  carotid pulses are full and equal bilaterally;no carotid bruit        Respiratory:  normal breath sounds, clear to auscultation, normal A-P diameter, normal symmetry, normal respiratory excursion, no use of accessory muscles         Cardiac: regular rhythm;normal S1 and S2;no S3 or S4;no murmurs, gallops or rubs detected                pulses full and equal                                        GI:           Extremities and Muscular Skeletal:  no edema;no spinal abnormalities noted;normal muscle strength and tone              Neurological:  no gross motor deficits        Psych:  affect appropriate, oriented to time, person and place        CC  Abbe Corley MD  [address]  [place] [id number]

## 2020-10-26 DIAGNOSIS — I25.10 CORONARY ARTERY DISEASE INVOLVING NATIVE CORONARY ARTERY OF NATIVE HEART WITHOUT ANGINA PECTORIS: Chronic | ICD-10-CM

## 2020-10-26 LAB
ANION GAP SERPL CALCULATED.3IONS-SCNC: 5 MMOL/L (ref 3–14)
BUN SERPL-MCNC: 16 MG/DL (ref 7–30)
CALCIUM SERPL-MCNC: 9 MG/DL (ref 8.5–10.1)
CHLORIDE SERPL-SCNC: 109 MMOL/L (ref 94–109)
CHOLEST SERPL-MCNC: 110 MG/DL
CO2 SERPL-SCNC: 27 MMOL/L (ref 20–32)
CREAT SERPL-MCNC: 1.14 MG/DL (ref 0.66–1.25)
GFR SERPL CREATININE-BSD FRML MDRD: 68 ML/MIN/{1.73_M2}
GLUCOSE SERPL-MCNC: 101 MG/DL (ref 70–99)
HDLC SERPL-MCNC: 41 MG/DL
LDLC SERPL CALC-MCNC: 39 MG/DL
NONHDLC SERPL-MCNC: 69 MG/DL
POTASSIUM SERPL-SCNC: 4.2 MMOL/L (ref 3.4–5.3)
SODIUM SERPL-SCNC: 141 MMOL/L (ref 133–144)
TRIGL SERPL-MCNC: 148 MG/DL

## 2020-10-26 PROCEDURE — 80061 LIPID PANEL: CPT | Performed by: INTERNAL MEDICINE

## 2020-10-26 PROCEDURE — 36415 COLL VENOUS BLD VENIPUNCTURE: CPT | Performed by: INTERNAL MEDICINE

## 2020-10-26 PROCEDURE — 80048 BASIC METABOLIC PNL TOTAL CA: CPT | Performed by: INTERNAL MEDICINE

## 2020-10-27 ENCOUNTER — VIRTUAL VISIT (OUTPATIENT)
Dept: CARDIOLOGY | Facility: CLINIC | Age: 65
End: 2020-10-27
Attending: INTERNAL MEDICINE
Payer: COMMERCIAL

## 2020-10-27 DIAGNOSIS — E78.6 HDL DEFICIENCY: ICD-10-CM

## 2020-10-27 DIAGNOSIS — I25.10 CORONARY ARTERY DISEASE INVOLVING NATIVE CORONARY ARTERY OF NATIVE HEART WITHOUT ANGINA PECTORIS: ICD-10-CM

## 2020-10-27 DIAGNOSIS — I49.3 PVC (PREMATURE VENTRICULAR CONTRACTION): ICD-10-CM

## 2020-10-27 DIAGNOSIS — I10 BENIGN ESSENTIAL HYPERTENSION: Primary | ICD-10-CM

## 2020-10-27 DIAGNOSIS — E78.5 HYPERLIPIDEMIA LDL GOAL <70: ICD-10-CM

## 2020-10-27 DIAGNOSIS — Z95.0 CARDIAC PACEMAKER IN SITU: ICD-10-CM

## 2020-10-27 DIAGNOSIS — R06.09 DYSPNEA ON EXERTION: ICD-10-CM

## 2020-10-27 PROCEDURE — 99214 OFFICE O/P EST MOD 30 MIN: CPT | Mod: 95 | Performed by: PHYSICIAN ASSISTANT

## 2020-10-27 RX ORDER — LOSARTAN POTASSIUM 100 MG/1
100 TABLET ORAL DAILY
Qty: 30 TABLET | Refills: 11 | Status: SHIPPED | OUTPATIENT
Start: 2020-10-27 | End: 2021-09-02

## 2020-10-27 NOTE — LETTER
"10/27/2020    Ric Villela MD, MD  [address] [facility] [address]  [facility] [address]    RE: Manan Bryant       Dear Colleague,    I had the pleasure of seeing Manan Bryant in the [facility].    Manan Bryant is a 65 year old male who is being evaluated via a billable video visit.      The patient has been notified of following:     \"This video visit will be conducted via a call between you and your physician/provider. We have found that certain health care needs can be provided without the need for an in-person physical exam.  This service lets us provide the care you need with a video conversation.  If a prescription is necessary we can send it directly to your pharmacy.  If lab work is needed we can place an order for that and you can then stop by our lab to have the test done at a later time.    Video visits are billed at different rates depending on your insurance coverage.  Please reach out to your insurance provider with any questions.    If during the course of the call the physician/provider feels a video visit is not appropriate, you will not be charged for this service.\"    Patient has given verbal consent for Video visit? Yes  How would you like to obtain your AVS? Mail a copy  If you are dropped from the video visit, the video invite should be resent to: Text to cell phone: [phone]  Will anyone else be joining your video visit? No      Vitals - Patient Reported  Systolic (Patient Reported): (!) 155  Diastolic (Patient Reported): 86  Weight (Patient Reported): 100.2 kg (221 lb)  Height (Patient Reported): 180.3 cm (5' 11\")  BMI (Based on Pt Reported Ht/Wt): 30.82  Pulse (Patient Reported): 58    Review Of Systems:  Skin: NEGATIVE  Eyes:Ears/Nose/Throat: NEGATIVE  Respiratory: NEGATIVE  Cardiovascular:NEGATIVE  Gastrointestinal: NEGATIVE  Genitourinary:NEGATIVE  Musculoskeletal: NEGATIVE  Neurologic: NEGATIVE  Psychiatric: " NEGATIVE  Hematologic/Lymphatic/Immunologic: NEGATIVE  Endocrine:  NEGATIVE    BABITA Apple    -----------------------------------------------------------------------------------------------------------------------------    Primary Cardiologist: Dr. Corley    Reason for Video Visit: HTN follow up    HPI:  This is a very pleasant 65 year old male with past medical history notable for CAD (hx of inferior MI in 8/2010; s/p aspiration thrombectomy and ELLIOTT to LAD), NSVT/PVC's, hyperlipidemia, and hypertension.     During his recent visit his BP was noted to be high. Losartan was increased to 50 mg and he was set up for follow up.     ROS:  12-pt ROS is negative except for as noted above.     Physical Exam:  A limited exam was conducted via video.  Constitutional:  Patient is pleasant, alert, cooperative, and in NAD.  HEENT:  NCAT. EOM's intact.   Neck:  CVP appears normal.   Pulmonary: Normal respiratory effort.   Extremities: No edema, erythema, cyanosis appreciated.  Skin:  No rashes or lesions appreciated.   Neurological:  No gross motor or sensory deficits.   Psych: Appropriate affect.       A/P:   This is a very pleasant 65 year old male with past medical history notable for CAD (hx of inferior MI in 8/2010; s/p aspiration thrombectomy and ELLIOTT to LAD), NSVT/PVC's, hyperlipidemia, and hypertension.    He is doing well but his BP continues to be high. I recommended we further increase losartan to 100 mg daily. He will return in one month with repeat echo prior.     Video start time: 1:15 PM  Video end time: 1:30 PM  Originating Location (pt. Location): home  Distant Location (provider location):  [facility]   Mode of Communication:  Video Conference via Vin[employer] phone application       This visit is being conducted as a virtual visit due to the emphasis on mitigation of the COVID-19 virus pandemic. The clinician has decided that the risk of an in-office visit outweighs the  benefit for this patient. The rest of the comprehensive physical examination is deferred due to public [facility] emergency video visit restrictions.         Tristen Padilla PA-C   10/27/2020  Pager: [phone]      Thank you for allowing me to participate in the care of your patient.    Sincerely,     Tristen Padilla PA-C     [facility]

## 2020-10-27 NOTE — PATIENT INSTRUCTIONS
Today's Plan:   1) Increase losartan to 100 mg daily.   2) Return in one month with repeat non-fasting lab prior.    If you have questions or concerns please call my nurse team at [phone].     Scheduling phone number: [phone]  Reminder: Please bring in all current medications, over the counter supplements and vitamin bottles to your next appointment.    It was a pleasure seeing you today!     Tristen Padilla PA-C  10/27/2020

## 2020-10-27 NOTE — PROGRESS NOTES
"Manan Bryant is a 65 year old male who is being evaluated via a billable video visit.      The patient has been notified of following:     \"This video visit will be conducted via a call between you and your physician/provider. We have found that certain health care needs can be provided without the need for an in-person physical exam.  This service lets us provide the care you need with a video conversation.  If a prescription is necessary we can send it directly to your pharmacy.  If lab work is needed we can place an order for that and you can then stop by our lab to have the test done at a later time.    Video visits are billed at different rates depending on your insurance coverage.  Please reach out to your insurance provider with any questions.    If during the course of the call the physician/provider feels a video visit is not appropriate, you will not be charged for this service.\"    Patient has given verbal consent for Video visit? Yes  How would you like to obtain your AVS? Mail a copy  If you are dropped from the video visit, the video invite should be resent to: Text to cell phone: [phone]  Will anyone else be joining your video visit? No      Vitals - Patient Reported  Systolic (Patient Reported): (!) 155  Diastolic (Patient Reported): 86  Weight (Patient Reported): 100.2 kg (221 lb)  Height (Patient Reported): 180.3 cm (5' 11\")  BMI (Based on Pt Reported Ht/Wt): 30.82  Pulse (Patient Reported): 58    Review Of Systems:  Skin: NEGATIVE  Eyes:Ears/Nose/Throat: NEGATIVE  Respiratory: NEGATIVE  Cardiovascular:NEGATIVE  Gastrointestinal: NEGATIVE  Genitourinary:NEGATIVE  Musculoskeletal: NEGATIVE  Neurologic: NEGATIVE  Psychiatric: NEGATIVE  Hematologic/Lymphatic/Immunologic: NEGATIVE  Endocrine:  NEGATIVE    BABITA Apple    -----------------------------------------------------------------------------------------------------------------------------    Primary Cardiologist: Dr. Corley    Reason " for Video Visit: HTN follow up    HPI:  This is a very pleasant 65 year old male with past medical history notable for CAD (hx of inferior MI in 8/2010; s/p aspiration thrombectomy and ELLIOTT to LAD), NSVT/PVC's, hyperlipidemia, and hypertension.     During his recent visit his BP was noted to be high. Losartan was increased to 50 mg and he was set up for follow up.     ROS:  12-pt ROS is negative except for as noted above.     Physical Exam:  A limited exam was conducted via video.  Constitutional:  Patient is pleasant, alert, cooperative, and in NAD.  HEENT:  NCAT. EOM's intact.   Neck:  CVP appears normal.   Pulmonary: Normal respiratory effort.   Extremities: No edema, erythema, cyanosis appreciated.  Skin:  No rashes or lesions appreciated.   Neurological:  No gross motor or sensory deficits.   Psych: Appropriate affect.       A/P:   This is a very pleasant 65 year old male with past medical history notable for CAD (hx of inferior MI in 8/2010; s/p aspiration thrombectomy and ELLIOTT to LAD), NSVT/PVC's, hyperlipidemia, and hypertension.    He is doing well but his BP continues to be high. I recommended we further increase losartan to 100 mg daily. He will return in one month with repeat echo prior.     Video start time: 1:15 PM  Video end time: 1:30 PM  Originating Location (pt. Location): home  Distant Location (provider location):  [facility]   Mode of Communication:  Video Conference via Student[employer] phone application       This visit is being conducted as a virtual visit due to the emphasis on mitigation of the COVID-19 virus pandemic. The clinician has decided that the risk of an in-office visit outweighs the benefit for this patient. The rest of the comprehensive physical examination is deferred due to public health emergency video visit restrictions.         Tristen Padilla PA-C   10/27/2020  Pager: [phone]

## 2020-11-30 DIAGNOSIS — E78.6 HDL DEFICIENCY: ICD-10-CM

## 2020-11-30 DIAGNOSIS — I49.3 PVC (PREMATURE VENTRICULAR CONTRACTION): ICD-10-CM

## 2020-11-30 DIAGNOSIS — I25.10 CORONARY ARTERY DISEASE INVOLVING NATIVE CORONARY ARTERY OF NATIVE HEART WITHOUT ANGINA PECTORIS: ICD-10-CM

## 2020-11-30 DIAGNOSIS — R06.09 DYSPNEA ON EXERTION: ICD-10-CM

## 2020-11-30 DIAGNOSIS — E78.5 HYPERLIPIDEMIA LDL GOAL <70: ICD-10-CM

## 2020-11-30 DIAGNOSIS — I10 BENIGN ESSENTIAL HYPERTENSION: ICD-10-CM

## 2020-11-30 DIAGNOSIS — Z95.0 CARDIAC PACEMAKER IN SITU: ICD-10-CM

## 2020-11-30 LAB
ANION GAP SERPL CALCULATED.3IONS-SCNC: 7 MMOL/L (ref 3–14)
BUN SERPL-MCNC: 18 MG/DL (ref 7–30)
CALCIUM SERPL-MCNC: 8.9 MG/DL (ref 8.5–10.1)
CHLORIDE SERPL-SCNC: 112 MMOL/L (ref 94–109)
CO2 SERPL-SCNC: 23 MMOL/L (ref 20–32)
CREAT SERPL-MCNC: 1.15 MG/DL (ref 0.66–1.25)
GFR SERPL CREATININE-BSD FRML MDRD: 67 ML/MIN/{1.73_M2}
GLUCOSE SERPL-MCNC: 99 MG/DL (ref 70–99)
POTASSIUM SERPL-SCNC: 4.2 MMOL/L (ref 3.4–5.3)
SODIUM SERPL-SCNC: 142 MMOL/L (ref 133–144)

## 2020-11-30 PROCEDURE — 80048 BASIC METABOLIC PNL TOTAL CA: CPT | Performed by: PHYSICIAN ASSISTANT

## 2020-11-30 PROCEDURE — 36415 COLL VENOUS BLD VENIPUNCTURE: CPT | Performed by: PHYSICIAN ASSISTANT

## 2021-01-15 ENCOUNTER — HEALTH MAINTENANCE LETTER (OUTPATIENT)
Age: 66
End: 2021-01-15

## 2021-02-10 ENCOUNTER — VIRTUAL VISIT (OUTPATIENT)
Dept: CARDIOLOGY | Facility: CLINIC | Age: 66
End: 2021-02-10
Attending: PHYSICIAN ASSISTANT
Payer: COMMERCIAL

## 2021-02-10 DIAGNOSIS — Z95.0 CARDIAC PACEMAKER IN SITU: ICD-10-CM

## 2021-02-10 DIAGNOSIS — I10 BENIGN ESSENTIAL HYPERTENSION: Primary | ICD-10-CM

## 2021-02-10 DIAGNOSIS — E78.5 HYPERLIPIDEMIA LDL GOAL <70: ICD-10-CM

## 2021-02-10 DIAGNOSIS — I49.3 PVC (PREMATURE VENTRICULAR CONTRACTION): ICD-10-CM

## 2021-02-10 DIAGNOSIS — R53.83 FATIGUE, UNSPECIFIED TYPE: ICD-10-CM

## 2021-02-10 DIAGNOSIS — R06.09 DYSPNEA ON EXERTION: ICD-10-CM

## 2021-02-10 DIAGNOSIS — I25.10 CORONARY ARTERY DISEASE INVOLVING NATIVE CORONARY ARTERY OF NATIVE HEART WITHOUT ANGINA PECTORIS: ICD-10-CM

## 2021-02-10 DIAGNOSIS — E78.6 HDL DEFICIENCY: ICD-10-CM

## 2021-02-10 PROCEDURE — 99213 OFFICE O/P EST LOW 20 MIN: CPT | Mod: 95 | Performed by: PHYSICIAN ASSISTANT

## 2021-02-10 RX ORDER — HYDROCHLOROTHIAZIDE 12.5 MG/1
12.5 TABLET ORAL DAILY
Qty: 30 TABLET | Refills: 3 | Status: SHIPPED | OUTPATIENT
Start: 2021-02-10 | End: 2021-06-09

## 2021-02-10 NOTE — LETTER
"2/10/2021    Ric Villela MD, MD  [address]  [facility] [address]    RE: Manan Bryant       Dear Colleague,    I had the pleasure of seeing Manan Bryant in the [facility].    Daquan is a 65 year old who is being evaluated via a billable video visit.      How would you like to obtain your AVS? MyChart  If the video visit is dropped, the invitation should be resent by: Send to e-mail at: [email or web address]  Will anyone else be joining your video visit? No      Vitals - Patient Reported 10/27/2020 2/10/2021   Height (Patient Reported) 5' 11\" 5' 11\"   Weight (Patient Reported) 221 lb 216 lb   BMI (Based on Pt Reported Ht/Wt) 30.82 kg/m2 30.13 kg/m2   Systolic (Patient Reported) 155 130   Diastolic (Patient Reported) 86 75   Pulse (Patient Reported) 58 53         Review Of Systems  Skin: NEGATIVE  Eyes:Ears/Nose/Throat: Glasses  Respiratory: NEGATIVE  Cardiovascular:NEGATIVE  Gastrointestinal: NEGATIVE  Genitourinary:NEGATIVE   Musculoskeletal: Arthritis  Neurologic: NEGATIVE  Psychiatric: NEGATIVE  Hematologic/Lymphatic/Immunologic: NEGATIVE  Endocrine:  NEGATIVE    Cora George [facility]    -----------------------------------------------------------------------------------------------------------    Primary Cardiologist: Dr. Corley    Reason for Video Visit: HTN management     HPI:  Daquan is a very pleasant 65 year old male with past medical history notable for CAD (hx of inferior MI in 8/2010; s/p aspiration thrombectomy and ELLIOTT to LAD), NSVT/PVC's, hyperlipidemia, and hypertension.     He believes his blood pressure is a bit better since the increase in losartan but continues to be suboptimal. He also had noticed fatigue over the last 3 weeks and is unsure what may be causing this. He denies sleep or bleeding issues. He tries to be active. No change in stress or diet.     ROS:  12-pt ROS is negative except for as noted above.   "     Physical Exam:  A limited exam was conducted via video.  Constitutional:  Patient is pleasant, alert, cooperative, and in NAD.  HEENT:  NCAT. EOM's intact.   Neck:  CVP appears normal.   Pulmonary: Normal respiratory effort.   Extremities: No edema, erythema, cyanosis appreciated.  Skin:  No rashes or lesions appreciated.   Neurological:  No gross motor or sensory deficits.   Psych: Appropriate affect.     A/P:   Daquan is a very pleasant 65 year old male with past medical history notable for CAD (hx of inferior MI in 8/2010; s/p aspiration thrombectomy and ELLIOTT to LAD), NSVT/PVC's, hyperlipidemia, and hypertension.     I recommended we add hydrochlorothiazide to his regimen for more optimal blood pressure control. We will check BMP, CBC, and TSH in a few weeks. He has no specific symptoms to suggest ischemia, volume overload, or arrhythmia.     Video start time: 2:00 PM  Video end time: 2:20 PM  Originating Location (pt. Location): home  Distant Location (provider location):  [facility]   Mode of Communication:  Video Conference via Amara phone application     This visit is being conducted as a virtual visit due to the emphasis on mitigation of the COVID-19 virus pandemic. The clinician has decided that the risk of an in-office visit outweighs the benefit for this patient.     Tristen Padilla PA-C   2/10/2021  Pager: [phone]        Thank you for allowing me to participate in the care of your patient.    Sincerely,     Tristen Padilla PA-C     [facility]

## 2021-02-10 NOTE — PROGRESS NOTES
"Daquan is a 65 year old who is being evaluated via a billable video visit.      How would you like to obtain your AVS? MyChart  If the video visit is dropped, the invitation should be resent by: Send to e-mail at: [email or web address]  Will anyone else be joining your video visit? No      Vitals - Patient Reported 10/27/2020 2/10/2021   Height (Patient Reported) 5' 11\" 5' 11\"   Weight (Patient Reported) 221 lb 216 lb   BMI (Based on Pt Reported Ht/Wt) 30.82 kg/m2 30.13 kg/m2   Systolic (Patient Reported) 155 130   Diastolic (Patient Reported) 86 75   Pulse (Patient Reported) 58 53         Review Of Systems  Skin: NEGATIVE  Eyes:Ears/Nose/Throat: Glasses  Respiratory: NEGATIVE  Cardiovascular:NEGATIVE  Gastrointestinal: NEGATIVE  Genitourinary:NEGATIVE   Musculoskeletal: Arthritis  Neurologic: NEGATIVE  Psychiatric: NEGATIVE  Hematologic/Lymphatic/Immunologic: NEGATIVE  Endocrine:  NEGATIVE    Cora George [facility]    -----------------------------------------------------------------------------------------------------------    Primary Cardiologist: Dr. Corley    Reason for Video Visit: HTN management     HPI:  Daquan is a very pleasant 65 year old male with past medical history notable for CAD (hx of inferior MI in 8/2010; s/p aspiration thrombectomy and ELLIOTT to LAD), NSVT/PVC's, hyperlipidemia, and hypertension.     He believes his blood pressure is a bit better since the increase in losartan but continues to be suboptimal. He also had noticed fatigue over the last 3 weeks and is unsure what may be causing this. He denies sleep or bleeding issues. He tries to be active. No change in stress or diet.     ROS:  12-pt ROS is negative except for as noted above.       Physical Exam:  A limited exam was conducted via video.  Constitutional:  Patient is pleasant, alert, cooperative, and in NAD.  HEENT:  NCAT. EOM's intact.   Neck:  CVP appears normal.   Pulmonary: Normal respiratory effort.   Extremities: No edema, erythema, " cyanosis appreciated.  Skin:  No rashes or lesions appreciated.   Neurological:  No gross motor or sensory deficits.   Psych: Appropriate affect.     A/P:   Daquan is a very pleasant 65 year old male with past medical history notable for CAD (hx of inferior MI in 8/2010; s/p aspiration thrombectomy and ELLIOTT to LAD), NSVT/PVC's, hyperlipidemia, and hypertension.     I recommended we add hydrochlorothiazide to his regimen for more optimal blood pressure control. We will check BMP, CBC, and TSH in a few weeks. He has no specific symptoms to suggest ischemia, volume overload, or arrhythmia.     Video start time: 2:00 PM  Video end time: 2:20 PM  Originating Location (pt. Location): home  Distant Location (provider location):  [facility]   Mode of Communication:  Video Conference via Otonomy phone application     This visit is being conducted as a virtual visit due to the emphasis on mitigation of the COVID-19 virus pandemic. The clinician has decided that the risk of an in-office visit outweighs the benefit for this patient.     Tristen Padilla PA-C   2/10/2021  Pager: [phone]

## 2021-03-03 ENCOUNTER — TELEPHONE (OUTPATIENT)
Dept: CARDIOLOGY | Facility: CLINIC | Age: 66
End: 2021-03-03

## 2021-03-03 DIAGNOSIS — Z95.0 CARDIAC PACEMAKER IN SITU: ICD-10-CM

## 2021-03-03 DIAGNOSIS — R53.83 FATIGUE, UNSPECIFIED TYPE: ICD-10-CM

## 2021-03-03 DIAGNOSIS — I10 BENIGN ESSENTIAL HYPERTENSION: ICD-10-CM

## 2021-03-03 DIAGNOSIS — I49.3 PVC (PREMATURE VENTRICULAR CONTRACTION): ICD-10-CM

## 2021-03-03 DIAGNOSIS — R06.09 DYSPNEA ON EXERTION: ICD-10-CM

## 2021-03-03 DIAGNOSIS — E78.6 HDL DEFICIENCY: ICD-10-CM

## 2021-03-03 DIAGNOSIS — E78.5 HYPERLIPIDEMIA LDL GOAL <70: ICD-10-CM

## 2021-03-03 DIAGNOSIS — I25.10 CORONARY ARTERY DISEASE INVOLVING NATIVE CORONARY ARTERY OF NATIVE HEART WITHOUT ANGINA PECTORIS: ICD-10-CM

## 2021-03-03 LAB
ANION GAP SERPL CALCULATED.3IONS-SCNC: 9 MMOL/L (ref 3–14)
BUN SERPL-MCNC: 23 MG/DL (ref 7–30)
CALCIUM SERPL-MCNC: 10.3 MG/DL (ref 8.5–10.1)
CHLORIDE SERPL-SCNC: 107 MMOL/L (ref 94–109)
CO2 SERPL-SCNC: 23 MMOL/L (ref 20–32)
CREAT SERPL-MCNC: 1.19 MG/DL (ref 0.66–1.25)
ERYTHROCYTE [DISTWIDTH] IN BLOOD BY AUTOMATED COUNT: 13.4 % (ref 10–15)
GFR SERPL CREATININE-BSD FRML MDRD: 64 ML/MIN/{1.73_M2}
GLUCOSE SERPL-MCNC: 93 MG/DL (ref 70–99)
HCT VFR BLD AUTO: 45.3 % (ref 40–53)
HGB BLD-MCNC: 15.5 G/DL (ref 13.3–17.7)
MCH RBC QN AUTO: 30.5 PG (ref 26.5–33)
MCHC RBC AUTO-ENTMCNC: 34.2 G/DL (ref 31.5–36.5)
MCV RBC AUTO: 89 FL (ref 78–100)
PLATELET # BLD AUTO: 226 10E9/L (ref 150–450)
POTASSIUM SERPL-SCNC: 4 MMOL/L (ref 3.4–5.3)
RBC # BLD AUTO: 5.09 10E12/L (ref 4.4–5.9)
SODIUM SERPL-SCNC: 139 MMOL/L (ref 133–144)
T4 FREE SERPL-MCNC: 1.1 NG/DL (ref 0.76–1.46)
TSH SERPL DL<=0.005 MIU/L-ACNC: 5.18 MU/L (ref 0.4–4)
WBC # BLD AUTO: 7.1 10E9/L (ref 4–11)

## 2021-03-03 PROCEDURE — 36415 COLL VENOUS BLD VENIPUNCTURE: CPT | Performed by: PHYSICIAN ASSISTANT

## 2021-03-03 PROCEDURE — 84443 ASSAY THYROID STIM HORMONE: CPT | Performed by: PHYSICIAN ASSISTANT

## 2021-03-03 PROCEDURE — 84439 ASSAY OF FREE THYROXINE: CPT | Performed by: PHYSICIAN ASSISTANT

## 2021-03-03 PROCEDURE — 80048 BASIC METABOLIC PNL TOTAL CA: CPT | Performed by: PHYSICIAN ASSISTANT

## 2021-03-03 PROCEDURE — 85027 COMPLETE CBC AUTOMATED: CPT | Performed by: PHYSICIAN ASSISTANT

## 2021-03-03 NOTE — TELEPHONE ENCOUNTER
Message from TYREL Tristen Padilla:  Renal function and CBC normal. Continue with hydrochlorothiazide. TSH is slightly elevated but t4 is normal. I am not sure if this is significant to explain his fatigue. I would have him discuss with Dr. Villela regarding his fatigue. We can also check to see if he snores/was told he stops breathing at night. Untreated sleep apnea can also cause fatigue. He may need a sleep study.     Tristen

## 2021-03-04 NOTE — TELEPHONE ENCOUNTER
Spoke with patient to review lab results. Asked about sleep issues. Patient states he has had a little work up of this in the past. He thinks the pandemic boredom is a factor. Patient states he will see Dr. Villela to discuss further and will consider a future sleep study after seeing his PCP. Patient asks to cancel the video TYREL visit as that was planned to discuss his lab results.

## 2021-04-07 NOTE — TELEPHONE ENCOUNTER
"Last Written Prescription Date:  11/13/18  Last Fill Quantity: 90 capsule,  # refills: 0   Last office visit: 6/1/2018 with prescribing provider:  Tika   Future Office Visit:      Requested Prescriptions   Pending Prescriptions Disp Refills     omeprazole (PRILOSEC) 20 MG DR capsule [Pharmacy Med Name: OMEPRAZOLE 20MG CAPSULES] 90 capsule 0     Sig: TAKE 1 CAPSULE(20 MG) BY MOUTH DAILY    PPI Protocol Passed - 3/16/2019 11:42 AM       Passed - Not on Clopidogrel (unless Pantoprazole ordered)       Passed - No diagnosis of osteoporosis on record       Passed - Recent (12 mo) or future (30 days) visit within the authorizing provider's specialty    Patient had office visit in the last 12 months or has a visit in the next 30 days with authorizing provider or within the authorizing provider's specialty.  See \"Patient Info\" tab in inbasket, or \"Choose Columns\" in Meds & Orders section of the refill encounter.             Passed - Medication is active on med list       Passed - Patient is age 18 or older          " Department of Internal Medicine  General Internal Medicine  Attending Progress Note  Chief Complaint   Patient presents with    Concern For COVID-19     positive covid, shortness of breath, EMS states 60% on room air.   94% on 15LPM NRB    Shortness of Breath     SUBJECTIVE:    Still intubated and sedated    OBJECTIVE      Medications    Current Facility-Administered Medications: albumin human 25 % IV solution 25 g, 25 g, Intravenous, PRN  cisatracurium besylate (NIMBEX) 200 mg in sodium chloride 0.9 % 100 mL infusion, 0.5-10 mcg/kg/min, Intravenous, Continuous  fentaNYL 5 mcg/ml in 0.9%  ml infusion, 12.5-200 mcg/hr, Intravenous, Continuous  hydrALAZINE (APRESOLINE) injection 10 mg, 10 mg, Intravenous, Q6H PRN  dexamethasone (DECADRON) injection 4 mg, 4 mg, Intravenous, Q12H  0.9 % sodium chloride infusion, 250 mL, Intravenous, PRN  polyvinyl alcohol (LIQUIFILM TEARS) 1.4 % ophthalmic solution 1 drop, 1 drop, Both Eyes, Q4H  Gabapentin SOLN 300 mg, 300 mg, Oral, Nightly  insulin glargine (LANTUS) injection vial 9 Units, 9 Units, Subcutaneous, QAM  famotidine (PEPCID) tablet 10 mg, 10 mg, Oral, Daily  [Held by provider] carvedilol (COREG) tablet 3.125 mg, 3.125 mg, Oral, BID WC  insulin lispro (HUMALOG) injection vial 0-6 Units, 0-6 Units, Subcutaneous, Q6H  sodium bicarbonate tablet 1,300 mg, 1,300 mg, Per NG tube, TID  heparin (porcine) injection 5,000 Units, 5,000 Units, Subcutaneous, 3 times per day  [Held by provider] hydrOXYzine (VISTARIL) capsule 25 mg, 25 mg, Oral, 4x Daily PRN  propofol injection, 5-50 mcg/kg/min, Intravenous, Titrated  [Held by provider] amLODIPine (NORVASC) tablet 5 mg, 5 mg, Oral, BID  [Held by provider] pentoxifylline (TRENTAL) extended release tablet 400 mg, 400 mg, Oral, BID  ipratropium-albuterol (DUONEB) nebulizer solution 1 ampule, 1 ampule, Inhalation, 4x daily  nystatin (MYCOSTATIN) 632418 UNIT/ML suspension 500,000 Units, 5 mL, Oral, 4x Daily  glucose (GLUTOSE) 40 % oral gel 15 g, 15 g, Oral, PRN  dextrose 50 % IV solution, 12.5 g, Intravenous, PRN  glucagon (rDNA) injection 1 mg, 1 mg, Intramuscular, PRN  dextrose 5 % solution, 100 mL/hr, Intravenous, PRN  acetaminophen (TYLENOL) tablet 500 mg, 500 mg, Oral, 4x Daily PRN  aspirin chewable tablet 81 mg, 81 mg, Oral, Daily  atorvastatin (LIPITOR) tablet 80 mg, 80 mg, Oral, Daily  clopidogrel (PLAVIX) tablet 75 mg, 75 mg, Oral, Daily  levothyroxine (SYNTHROID) tablet 88 mcg, 88 mcg, Oral, Daily  sodium chloride flush 0.9 % injection 10 mL, 10 mL, Intravenous, 2 times per day  sodium chloride flush 0.9 % injection 10 mL, 10 mL, Intravenous, PRN  promethazine (PHENERGAN) tablet 12.5 mg, 12.5 mg, Oral, Q6H PRN **OR** ondansetron (ZOFRAN) injection 4 mg, 4 mg, Intravenous, Q6H PRN  polyethylene glycol (GLYCOLAX) packet 17 g, 17 g, Oral, Daily PRN  [DISCONTINUED] acetaminophen (TYLENOL) tablet 650 mg, 650 mg, Oral, Q6H PRN **OR** acetaminophen (TYLENOL) suppository 650 mg, 650 mg, Rectal, Q6H PRN  Physical    VITALS:  BP (!) 84/48   Pulse 63   Temp 96.2 °F (35.7 °C)   Resp (!) 35   Ht 5' (1.524 m)   Wt 139 lb 5.3 oz (63.2 kg)   SpO2 100%   BMI 27.21 kg/m²   CONSTITUTIONAL:  Sedated and intubated  ENT:  Normocephalic, without obvious abnormality, atraumatic, sinuses nontender on palpation, external ears without lesions, oral pharynx with moist mucus membranes, tonsils without erythema or exudates, gums normal and good dentition.   NECK:  Supple, symmetrical, trachea midline, no adenopathy, thyroid symmetric, not enlarged and no tenderness, skin normal  HEMATOLOGIC/LYMPHATICS:  no cervical lymphadenopathy  LUNGS:  No increased work of breathing, good air exchange, clear to auscultation bilaterally, no crackles or wheezing  CARDIOVASCULAR:  Normal apical impulse, regular rate and rhythm, normal S1 and S2, no S3 or S4, and no murmur noted  ABDOMEN:  No scars, normal bowel sounds, soft, non-distended, non-tender, no masses palpated, no hepatosplenomegally  MUSCULOSKELETAL:  there is no redness, warmth, or swelling of the joints  NEUROLOGIC:  Unable to assess. SKIN:  no bruising or bleeding  Data    CBC:   Lab Results   Component Value Date    WBC 11.8 04/07/2021    RBC 2.48 04/07/2021    HGB 7.1 04/07/2021    HCT 22.5 04/07/2021    MCV 90.7 04/07/2021    MCH 28.6 04/07/2021    MCHC 31.6 04/07/2021    RDW 16.3 04/07/2021     04/07/2021    MPV 11.0 04/07/2021     BMP:    Lab Results   Component Value Date     04/07/2021    K 4.0 04/07/2021    K 4.2 03/21/2021    CL 97 04/07/2021    CO2 29 04/07/2021    BUN 64 04/07/2021    LABALBU 2.2 03/24/2021    LABALBU 4.1 03/21/2011    CREATININE 2.4 04/07/2021    CALCIUM 8.0 04/07/2021    GFRAA 25 04/07/2021    LABGLOM 25 04/07/2021    GLUCOSE 170 04/07/2021    GLUCOSE 419 03/21/2011       ASSESSMENT AND PLAN      1. Altered mental state    2. Acute respiratory failure with hypoxia ([facility] [address]    3.  COVID-19    4. Goals of care, counseling/discussion    5. Palliative care by specialist    6. CKD now on Dialysis    7. Diabetes mellitus type 2:    8.  Malnutrition: nutrition support with tube feeding via NG    Plan:   Possible peg and trach  Continue dialysis per nephro  Continue insulin  Continue vent management per intensitivist  Monitor Hgb and transfuse if needed

## 2021-06-09 DIAGNOSIS — I10 BENIGN ESSENTIAL HYPERTENSION: ICD-10-CM

## 2021-06-09 RX ORDER — HYDROCHLOROTHIAZIDE 12.5 MG/1
12.5 TABLET ORAL DAILY
Qty: 90 TABLET | Refills: 0 | Status: SHIPPED | OUTPATIENT
Start: 2021-06-09 | End: 2021-08-03

## 2021-08-02 DIAGNOSIS — I25.10 CORONARY ARTERY DISEASE INVOLVING NATIVE CORONARY ARTERY OF NATIVE HEART WITHOUT ANGINA PECTORIS: Chronic | ICD-10-CM

## 2021-08-02 RX ORDER — METOPROLOL SUCCINATE 50 MG/1
50 TABLET, EXTENDED RELEASE ORAL DAILY
Qty: 90 TABLET | Refills: 0 | Status: SHIPPED | OUTPATIENT
Start: 2021-08-02 | End: 2021-09-02

## 2021-08-03 DIAGNOSIS — E78.5 HYPERLIPIDEMIA LDL GOAL <70: Chronic | ICD-10-CM

## 2021-08-03 DIAGNOSIS — I10 BENIGN ESSENTIAL HYPERTENSION: ICD-10-CM

## 2021-08-03 RX ORDER — ROSUVASTATIN CALCIUM 40 MG/1
40 TABLET, COATED ORAL DAILY
Qty: 90 TABLET | Refills: 0 | Status: SHIPPED | OUTPATIENT
Start: 2021-08-03 | End: 2021-09-02

## 2021-08-03 RX ORDER — HYDROCHLOROTHIAZIDE 12.5 MG/1
12.5 TABLET ORAL DAILY
Qty: 90 TABLET | Refills: 0 | Status: SHIPPED | OUTPATIENT
Start: 2021-08-03 | End: 2021-09-02

## 2021-08-15 ENCOUNTER — APPOINTMENT (OUTPATIENT)
Dept: URGENT CARE | Facility: CLINIC | Age: 66
End: 2021-08-15
Payer: COMMERCIAL

## 2021-09-02 ENCOUNTER — OFFICE VISIT (OUTPATIENT)
Dept: CARDIOLOGY | Facility: CLINIC | Age: 66
End: 2021-09-02
Payer: COMMERCIAL

## 2021-09-02 VITALS
OXYGEN SATURATION: 100 % | DIASTOLIC BLOOD PRESSURE: 84 MMHG | HEART RATE: 59 BPM | SYSTOLIC BLOOD PRESSURE: 133 MMHG | HEIGHT: 72 IN | BODY MASS INDEX: 30.12 KG/M2 | WEIGHT: 222.4 LBS

## 2021-09-02 DIAGNOSIS — E78.5 HYPERLIPIDEMIA LDL GOAL <70: Chronic | ICD-10-CM

## 2021-09-02 DIAGNOSIS — K21.9 GASTROESOPHAGEAL REFLUX DISEASE WITHOUT ESOPHAGITIS: ICD-10-CM

## 2021-09-02 DIAGNOSIS — I25.10 CORONARY ARTERY DISEASE INVOLVING NATIVE CORONARY ARTERY OF NATIVE HEART WITHOUT ANGINA PECTORIS: Primary | Chronic | ICD-10-CM

## 2021-09-02 DIAGNOSIS — I10 BENIGN ESSENTIAL HYPERTENSION: ICD-10-CM

## 2021-09-02 PROCEDURE — 99214 OFFICE O/P EST MOD 30 MIN: CPT | Performed by: PHYSICIAN ASSISTANT

## 2021-09-02 RX ORDER — METOPROLOL SUCCINATE 50 MG/1
50 TABLET, EXTENDED RELEASE ORAL DAILY
Qty: 90 TABLET | Refills: 3 | Status: SHIPPED | OUTPATIENT
Start: 2021-09-02 | End: 2022-05-25

## 2021-09-02 RX ORDER — LOSARTAN POTASSIUM 100 MG/1
100 TABLET ORAL DAILY
Qty: 90 TABLET | Refills: 3 | Status: SHIPPED | OUTPATIENT
Start: 2021-09-02 | End: 2022-08-18

## 2021-09-02 RX ORDER — HYDROCHLOROTHIAZIDE 12.5 MG/1
12.5 TABLET ORAL DAILY
Qty: 90 TABLET | Refills: 3 | Status: SHIPPED | OUTPATIENT
Start: 2021-09-02 | End: 2022-05-25

## 2021-09-02 RX ORDER — NITROGLYCERIN 0.4 MG/1
0.4 TABLET SUBLINGUAL EVERY 5 MIN PRN
Qty: 25 TABLET | Refills: 3 | Status: ON HOLD | OUTPATIENT
Start: 2021-09-02 | End: 2023-12-30

## 2021-09-02 RX ORDER — ROSUVASTATIN CALCIUM 40 MG/1
40 TABLET, COATED ORAL DAILY
Qty: 90 TABLET | Refills: 3 | Status: SHIPPED | OUTPATIENT
Start: 2021-09-02 | End: 2022-05-25

## 2021-09-02 ASSESSMENT — MIFFLIN-ST. JEOR: SCORE: 1828.86

## 2021-09-02 NOTE — LETTER
9/2/2021    Ric Villela MD, MD  [address]  [facility] [address]    RE: Manan Bryant       Dear Colleague,    I had the pleasure of seeing Manan Bryant in the [facility].    Primary Cardiologist: Dr. Corley    Reason for Visit: 6 month follow up     History of Present Illness:   Daquan is a very pleasant 66 year old male with past medical history notable for CAD (hx of inferior MI in 8/2010; s/p aspiration thrombectomy and ELLIOTT to LAD), NSVT/PVC's, hyperlipidemia, and hypertension.     He returns to clinic today, stating he is doing well.  He denies chest discomfort, lightheadedness, shortness of breath, palpitations.  He has not been checking his blood pressure regularly.  He does get orthostatic hypotension once in a while but this is not too bothersome for him.    Assessment and Plan:  Daquan is a very pleasant 66 year old male with past medical history notable for CAD (hx of inferior MI in 8/2010; s/p aspiration thrombectomy and ELLIOTT to LAD), NSVT/PVC's, hyperlipidemia, and hypertension.     His blood pressure is under better control today.  He will check his blood pressure at home more consistently.  We talked about importance of regular exercise and low-sodium diet.  He will focus on changing his lifestyle.  He will let us know if his blood pressure is higher than 140/80 on average.  I refilled all of his medications.  He will see us in cardiology clinic in 1 year.    This note was completed in part using Dragon voice recognition software. Although reviewed after completion, some word and grammatical errors may occur.    Orders this Visit:  No orders of the defined types were placed in this encounter.    No orders of the defined types were placed in this encounter.    There are no discontinued medications.      No diagnosis found.    CURRENT MEDICATIONS:  Current Outpatient Medications   Medication Sig Dispense Refill     ASPIRIN  PO Take 81 mg by mouth daily       Cholecalciferol (VITAMIN D PO) Take 1,000 mg by mouth daily        hydrochlorothiazide (HYDRODIURIL) 12.5 MG tablet Take 1 tablet (12.5 mg) by mouth daily 90 tablet 0     losartan (COZAAR) 100 MG tablet Take 1 tablet (100 mg) by mouth daily 30 tablet 11     metoprolol succinate ER (TOPROL-XL) 50 MG 24 hr tablet Take 1 tablet (50 mg) by mouth daily 90 tablet 0     Multiple Vitamin (MULTI-VITAMIN PO) Take by mouth daily        nitroGLYcerin (NITROSTAT) 0.4 MG sublingual tablet Place 1 tablet (0.4 mg) under the tongue every 5 minutes as needed for chest pain 25 tablet 3     omeprazole (PRILOSEC) 20 MG DR capsule Take 1 capsule (20 mg) by mouth daily 30 capsule 11     rosuvastatin (CRESTOR) 40 MG tablet Take 1 tablet (40 mg) by mouth daily 90 tablet 0       ALLERGIES   No Known Allergies    PAST MEDICAL HISTORY:  Past Medical History:   Diagnosis Date     CAD (coronary artery disease)     cardiac cath 8/5/2010: ELLIOTT to LAD     Essential hypertension, benign      Family history of early CAD      History of acute anterior wall MI 2010 2010     History of colonic polyps      Mixed hyperlipidemia      Myocardial infarction (H)      Paroxysmal supraventricular tachycardia (H)      PVC (premature ventricular contraction)     Hx ventricular tachycardia during cardiac rehab 2010     SVT (supraventricular tachycardia) (H)      Syncope     episode 2016 - EP study - loop recorder implanted 2016 - non-functional after 3 years       PAST SURGICAL HISTORY:  Past Surgical History:   Procedure Laterality Date     STENT, CORONARY, EMEKA  8/2010    LAD       FAMILY HISTORY:  Family History   Problem Relation Age of Onset     Coronary Artery Disease Father      Myocardial Infarction Father      Coronary Artery Disease Brother      Myocardial Infarction Brother      Heart Surgery Brother         bypass     Coronary Artery Disease Paternal Grandfather      Myocardial Infarction Paternal Grandfather       Sleep Apnea Sister      Family History Negative Mother      Family History Negative Maternal Grandmother      Heart Failure Maternal Grandfather      Family History Negative Paternal Grandmother      Myocardial Infarction Brother      Coronary Artery Disease Brother      Heart Surgery Brother         bypass       SOCIAL HISTORY:  Social History     Socioeconomic History     Marital status:      Spouse name: Not on file     Number of children: Not on file     Years of education: Not on file     Highest education level: Not on file   Occupational History     Not on file   Tobacco Use     Smoking status: Never Smoker     Smokeless tobacco: Never Used   Substance and Sexual Activity     Alcohol use: No     Alcohol/week: 0.0 standard drinks     Drug use: No     Sexual activity: Never   Other Topics Concern     Parent/sibling w/ CABG, MI or angioplasty before 65F 55M? Not Asked      Service Not Asked     Blood Transfusions Not Asked     Caffeine Concern No     Comment: decaf: 1-2 cups a day. Diek coke: 4-5 cans a day     Occupational Exposure Not Asked     Hobby Hazards Not Asked     Sleep Concern No     Stress Concern No     Weight Concern No     Special Diet Yes     Comment: limited, heart healthy     Back Care Not Asked     Exercise Yes     Comment: bike outside, 1-3x a week     Bike Helmet Yes     Seat Belt Yes     Self-Exams Not Asked   Social History Narrative    ,    Retired - continues      Social Determinants of Health     Financial Resource Strain:      Difficulty of Paying Living Expenses:    Food Insecurity:      Worried About Running Out of Food in the Last Year:      Ran Out of Food in the Last Year:    Transportation Needs:      Lack of Transportation (Medical):      Lack of Transportation (Non-Medical):    Physical Activity:      Days of Exercise per Week:      Minutes of Exercise per Session:    Stress:      Feeling of Stress :    Social Connections:       Frequency of Communication with Friends and Family:      Frequency of Social Gatherings with Friends and Family:      Attends Voodoo Services:      Active Member of Clubs or Organizations:      Attends Club or Organization Meetings:      Marital Status:    Intimate Partner Violence:      Fear of Current or Ex-Partner:      Emotionally Abused:      Physically Abused:      Sexually Abused:        Review of Systems:  Skin:        Eyes:       ENT:       Respiratory:       Cardiovascular:       Gastroenterology:      Genitourinary:       Musculoskeletal:       Neurologic:       Psychiatric:       Heme/Lymph/Imm:       Endocrine:         Physical Exam:  Vitals: There were no vitals taken for this visit.     GEN:  NAD  NECK: No JVD  C/V:  Regular rate and rhythm, no murmur, rub or gallop.  RESP: Clear to auscultation bilaterally without wheezing, rales, or rhonchi.  GI: Abdomen soft, nontender, nondistended.   EXTREM: No pitting LE edema.   NEURO: Alert and oriented, cooperative. No obvious focal deficits.   PSYCH: Normal affect.  SKIN: Warm and dry.       Recent Lab Results:  LIPID RESULTS:  Lab Results   Component Value Date    CHOL 110 10/26/2020    HDL 41 10/26/2020    LDL 39 10/26/2020    TRIG 148 10/26/2020    CHOLHDLRATIO 3.3 06/26/2015       LIVER ENZYME RESULTS:  Lab Results   Component Value Date    AST 26 06/21/2019    ALT 33 08/17/2020       CBC RESULTS:  Lab Results   Component Value Date    WBC 7.1 03/03/2021    RBC 5.09 03/03/2021    HGB 15.5 03/03/2021    HCT 45.3 03/03/2021    MCV 89 03/03/2021    MCH 30.5 03/03/2021    MCHC 34.2 03/03/2021    RDW 13.4 03/03/2021     03/03/2021       BMP RESULTS:  Lab Results   Component Value Date     03/03/2021    POTASSIUM 4.0 03/03/2021    CHLORIDE 107 03/03/2021    CO2 23 03/03/2021    ANIONGAP 9 03/03/2021    GLC 93 03/03/2021    BUN 23 03/03/2021    CR 1.19 03/03/2021    GFRESTIMATED 64 03/03/2021    GFRESTBLACK 75 03/03/2021    LES 10.3 (H)  03/03/2021        A1C RESULTS:  Lab Results   Component Value Date    A1C 5.7 (H) 08/17/2020       INR RESULTS:  Lab Results   Component Value Date    INR 0.95 08/05/2010           Tristen Padilla PA-C  September 2, 2021         Thank you for allowing me to participate in the care of your patient.      Sincerely,     Tristen Padilla PA-C     [facility]  cc:   No referring provider defined for this encounter.

## 2021-09-02 NOTE — PROGRESS NOTES
Primary Cardiologist: Dr. Corley    Reason for Visit: 6 month follow up     History of Present Illness:   Daquan is a very pleasant 66 year old male with past medical history notable for CAD (hx of inferior MI in 8/2010; s/p aspiration thrombectomy and ELLIOTT to LAD), NSVT/PVC's, hyperlipidemia, and hypertension.     He returns to clinic today, stating he is doing well.  He denies chest discomfort, lightheadedness, shortness of breath, palpitations.  He has not been checking his blood pressure regularly.  He does get orthostatic hypotension once in a while but this is not too bothersome for him.    Assessment and Plan:  Daquan is a very pleasant 66 year old male with past medical history notable for CAD (hx of inferior MI in 8/2010; s/p aspiration thrombectomy and ELLIOTT to LAD), NSVT/PVC's, hyperlipidemia, and hypertension.     His blood pressure is under better control today.  He will check his blood pressure at home more consistently.  We talked about importance of regular exercise and low-sodium diet.  He will focus on changing his lifestyle.  He will let us know if his blood pressure is higher than 140/80 on average.  I refilled all of his medications.  He will see us in cardiology clinic in 1 year.    This note was completed in part using Dragon voice recognition software. Although reviewed after completion, some word and grammatical errors may occur.    Orders this Visit:  No orders of the defined types were placed in this encounter.    No orders of the defined types were placed in this encounter.    There are no discontinued medications.      No diagnosis found.    CURRENT MEDICATIONS:  Current Outpatient Medications   Medication Sig Dispense Refill     ASPIRIN PO Take 81 mg by mouth daily       Cholecalciferol (VITAMIN D PO) Take 1,000 mg by mouth daily        hydrochlorothiazide (HYDRODIURIL) 12.5 MG tablet Take 1 tablet (12.5 mg) by mouth daily 90 tablet 0     losartan (COZAAR) 100 MG tablet Take 1 tablet (100  mg) by mouth daily 30 tablet 11     metoprolol succinate ER (TOPROL-XL) 50 MG 24 hr tablet Take 1 tablet (50 mg) by mouth daily 90 tablet 0     Multiple Vitamin (MULTI-VITAMIN PO) Take by mouth daily        nitroGLYcerin (NITROSTAT) 0.4 MG sublingual tablet Place 1 tablet (0.4 mg) under the tongue every 5 minutes as needed for chest pain 25 tablet 3     omeprazole (PRILOSEC) 20 MG DR capsule Take 1 capsule (20 mg) by mouth daily 30 capsule 11     rosuvastatin (CRESTOR) 40 MG tablet Take 1 tablet (40 mg) by mouth daily 90 tablet 0       ALLERGIES   No Known Allergies    PAST MEDICAL HISTORY:  Past Medical History:   Diagnosis Date     CAD (coronary artery disease)     cardiac cath 8/5/2010: ELLIOTT to LAD     Essential hypertension, benign      Family history of early CAD      History of acute anterior wall MI 2010 2010     History of colonic polyps      Mixed hyperlipidemia      Myocardial infarction (H)      Paroxysmal supraventricular tachycardia (H)      PVC (premature ventricular contraction)     Hx ventricular tachycardia during cardiac rehab 2010     SVT (supraventricular tachycardia) (H)      Syncope     episode 2016 - EP study - loop recorder implanted 2016 - non-functional after 3 years       PAST SURGICAL HISTORY:  Past Surgical History:   Procedure Laterality Date     STENT, CORONARY, EMEKA  8/2010    LAD       FAMILY HISTORY:  Family History   Problem Relation Age of Onset     Coronary Artery Disease Father      Myocardial Infarction Father      Coronary Artery Disease Brother      Myocardial Infarction Brother      Heart Surgery Brother         bypass     Coronary Artery Disease Paternal Grandfather      Myocardial Infarction Paternal Grandfather      Sleep Apnea Sister      Family History Negative Mother      Family History Negative Maternal Grandmother      Heart Failure Maternal Grandfather      Family History Negative Paternal Grandmother      Myocardial Infarction Brother      Coronary Artery Disease  Brother      Heart Surgery Brother         bypass       SOCIAL HISTORY:  Social History     Socioeconomic History     Marital status:      Spouse name: Not on file     Number of children: Not on file     Years of education: Not on file     Highest education level: Not on file   Occupational History     Not on file   Tobacco Use     Smoking status: Never Smoker     Smokeless tobacco: Never Used   Substance and Sexual Activity     Alcohol use: No     Alcohol/week: 0.0 standard drinks     Drug use: No     Sexual activity: Never   Other Topics Concern     Parent/sibling w/ CABG, MI or angioplasty before 65F 55M? Not Asked      Service Not Asked     Blood Transfusions Not Asked     Caffeine Concern No     Comment: decaf: 1-2 cups a day. Diek coke: 4-5 cans a day     Occupational Exposure Not Asked     Hobby Hazards Not Asked     Sleep Concern No     Stress Concern No     Weight Concern No     Special Diet Yes     Comment: limited, heart healthy     Back Care Not Asked     Exercise Yes     Comment: bike outside, 1-3x a week     Bike Helmet Yes     Seat Belt Yes     Self-Exams Not Asked   Social History Narrative    ,    Retired - continues      Social Determinants of Health     Financial Resource Strain:      Difficulty of Paying Living Expenses:    Food Insecurity:      Worried About Running Out of Food in the Last Year:      Ran Out of Food in the Last Year:    Transportation Needs:      Lack of Transportation (Medical):      Lack of Transportation (Non-Medical):    Physical Activity:      Days of Exercise per Week:      Minutes of Exercise per Session:    Stress:      Feeling of Stress :    Social Connections:      Frequency of Communication with Friends and Family:      Frequency of Social Gatherings with Friends and Family:      Attends Jainism Services:      Active Member of Clubs or Organizations:      Attends Club or Organization Meetings:      Marital Status:     Intimate Partner Violence:      Fear of Current or Ex-Partner:      Emotionally Abused:      Physically Abused:      Sexually Abused:        Review of Systems:  Skin:        Eyes:       ENT:       Respiratory:       Cardiovascular:       Gastroenterology:      Genitourinary:       Musculoskeletal:       Neurologic:       Psychiatric:       Heme/Lymph/Imm:       Endocrine:         Physical Exam:  Vitals: There were no vitals taken for this visit.     GEN:  NAD  NECK: No JVD  C/V:  Regular rate and rhythm, no murmur, rub or gallop.  RESP: Clear to auscultation bilaterally without wheezing, rales, or rhonchi.  GI: Abdomen soft, nontender, nondistended.   EXTREM: No pitting LE edema.   NEURO: Alert and oriented, cooperative. No obvious focal deficits.   PSYCH: Normal affect.  SKIN: Warm and dry.       Recent Lab Results:  LIPID RESULTS:  Lab Results   Component Value Date    CHOL 110 10/26/2020    HDL 41 10/26/2020    LDL 39 10/26/2020    TRIG 148 10/26/2020    CHOLHDLRATIO 3.3 06/26/2015       LIVER ENZYME RESULTS:  Lab Results   Component Value Date    AST 26 06/21/2019    ALT 33 08/17/2020       CBC RESULTS:  Lab Results   Component Value Date    WBC 7.1 03/03/2021    RBC 5.09 03/03/2021    HGB 15.5 03/03/2021    HCT 45.3 03/03/2021    MCV 89 03/03/2021    MCH 30.5 03/03/2021    MCHC 34.2 03/03/2021    RDW 13.4 03/03/2021     03/03/2021       BMP RESULTS:  Lab Results   Component Value Date     03/03/2021    POTASSIUM 4.0 03/03/2021    CHLORIDE 107 03/03/2021    CO2 23 03/03/2021    ANIONGAP 9 03/03/2021    GLC 93 03/03/2021    BUN 23 03/03/2021    CR 1.19 03/03/2021    GFRESTIMATED 64 03/03/2021    GFRESTBLACK 75 03/03/2021    LES 10.3 (H) 03/03/2021        A1C RESULTS:  Lab Results   Component Value Date    A1C 5.7 (H) 08/17/2020       INR RESULTS:  Lab Results   Component Value Date    INR 0.95 08/05/2010           Tristen Padilla PA-C  September 2, 2021

## 2021-09-04 ENCOUNTER — HEALTH MAINTENANCE LETTER (OUTPATIENT)
Age: 66
End: 2021-09-04

## 2021-09-08 ENCOUNTER — MYC MEDICAL ADVICE (OUTPATIENT)
Dept: FAMILY MEDICINE | Facility: CLINIC | Age: 66
End: 2021-09-08

## 2021-09-08 DIAGNOSIS — K76.0 NAFLD (NONALCOHOLIC FATTY LIVER DISEASE): ICD-10-CM

## 2021-09-08 DIAGNOSIS — E78.5 HYPERLIPIDEMIA LDL GOAL <70: Primary | ICD-10-CM

## 2021-09-08 DIAGNOSIS — I25.10 CORONARY ARTERY DISEASE INVOLVING NATIVE CORONARY ARTERY OF NATIVE HEART WITHOUT ANGINA PECTORIS: ICD-10-CM

## 2021-09-08 DIAGNOSIS — I10 ESSENTIAL HYPERTENSION, BENIGN: ICD-10-CM

## 2021-09-09 ENCOUNTER — LAB (OUTPATIENT)
Dept: LAB | Facility: CLINIC | Age: 66
End: 2021-09-09
Payer: COMMERCIAL

## 2021-09-09 DIAGNOSIS — K21.9 GASTROESOPHAGEAL REFLUX DISEASE WITHOUT ESOPHAGITIS: ICD-10-CM

## 2021-09-09 DIAGNOSIS — I10 BENIGN ESSENTIAL HYPERTENSION: ICD-10-CM

## 2021-09-09 DIAGNOSIS — E78.5 HYPERLIPIDEMIA LDL GOAL <70: Chronic | ICD-10-CM

## 2021-09-09 DIAGNOSIS — I10 ESSENTIAL HYPERTENSION, BENIGN: ICD-10-CM

## 2021-09-09 DIAGNOSIS — K76.0 NAFLD (NONALCOHOLIC FATTY LIVER DISEASE): ICD-10-CM

## 2021-09-09 DIAGNOSIS — I25.10 CORONARY ARTERY DISEASE INVOLVING NATIVE CORONARY ARTERY OF NATIVE HEART WITHOUT ANGINA PECTORIS: Chronic | ICD-10-CM

## 2021-09-09 LAB
ALBUMIN SERPL-MCNC: 3.9 G/DL (ref 3.4–5)
ALP SERPL-CCNC: 105 U/L (ref 40–150)
ALT SERPL W P-5'-P-CCNC: 30 U/L (ref 0–70)
ANION GAP SERPL CALCULATED.3IONS-SCNC: 4 MMOL/L (ref 3–14)
AST SERPL W P-5'-P-CCNC: 27 U/L (ref 0–45)
BILIRUB SERPL-MCNC: 0.9 MG/DL (ref 0.2–1.3)
BUN SERPL-MCNC: 23 MG/DL (ref 7–30)
CALCIUM SERPL-MCNC: 8.9 MG/DL (ref 8.5–10.1)
CHLORIDE BLD-SCNC: 108 MMOL/L (ref 94–109)
CHOLEST SERPL-MCNC: 124 MG/DL
CO2 SERPL-SCNC: 24 MMOL/L (ref 20–32)
CREAT SERPL-MCNC: 1.3 MG/DL (ref 0.66–1.25)
ERYTHROCYTE [DISTWIDTH] IN BLOOD BY AUTOMATED COUNT: 12.8 % (ref 10–15)
GFR SERPL CREATININE-BSD FRML MDRD: 58 ML/MIN/1.73M2
GLUCOSE BLD-MCNC: 93 MG/DL (ref 70–99)
HBA1C MFR BLD: 5.8 % (ref 0–5.6)
HCT VFR BLD AUTO: 43 % (ref 40–53)
HDLC SERPL-MCNC: 41 MG/DL
HGB BLD-MCNC: 15 G/DL (ref 13.3–17.7)
LDLC SERPL CALC-MCNC: 60 MG/DL
MCH RBC QN AUTO: 30.6 PG (ref 26.5–33)
MCHC RBC AUTO-ENTMCNC: 34.9 G/DL (ref 31.5–36.5)
MCV RBC AUTO: 88 FL (ref 78–100)
NONHDLC SERPL-MCNC: 83 MG/DL
PLATELET # BLD AUTO: 214 10E3/UL (ref 150–450)
POTASSIUM BLD-SCNC: 3.7 MMOL/L (ref 3.4–5.3)
PROT SERPL-MCNC: 7.6 G/DL (ref 6.8–8.8)
PSA SERPL-MCNC: 0.64 UG/L (ref 0–4)
RBC # BLD AUTO: 4.9 10E6/UL (ref 4.4–5.9)
SODIUM SERPL-SCNC: 136 MMOL/L (ref 133–144)
TRIGL SERPL-MCNC: 113 MG/DL
WBC # BLD AUTO: 5.5 10E3/UL (ref 4–11)

## 2021-09-09 PROCEDURE — 85027 COMPLETE CBC AUTOMATED: CPT

## 2021-09-09 PROCEDURE — 80061 LIPID PANEL: CPT

## 2021-09-09 PROCEDURE — 83036 HEMOGLOBIN GLYCOSYLATED A1C: CPT

## 2021-09-09 PROCEDURE — 80053 COMPREHEN METABOLIC PANEL: CPT

## 2021-09-09 PROCEDURE — G0103 PSA SCREENING: HCPCS

## 2021-09-09 PROCEDURE — 36415 COLL VENOUS BLD VENIPUNCTURE: CPT

## 2021-09-10 NOTE — RESULT ENCOUNTER NOTE
Ismael Hinkle,    I had the opportunity to review your recent labs and a summary of your labs reads as follows:    Your complete blood counts show no sign of anemia, normal white blood cell count and platelets.  Your comprehensive metabolic panel showed a decline in your renal function - Maintain hydration, we can recheck at your upcoming visit  Your fasting lipid panel show  - normal HDL (good) cholesterol -as your goal is greater than 40  - low LDL (bad) cholesterol as your goal is less than 130  - normal triglyceride levels  Your PSA level is also stable indicating no evidence of prostate cancer        Sincerely,  Ric Villela MD

## 2021-09-13 ASSESSMENT — ENCOUNTER SYMPTOMS
FEVER: 0
ARTHRALGIAS: 0
MYALGIAS: 0
HEMATOCHEZIA: 0
PALPITATIONS: 0
CHILLS: 0
FREQUENCY: 0
DIZZINESS: 0
NAUSEA: 0
CONSTIPATION: 0
ABDOMINAL PAIN: 0
HEARTBURN: 0
PARESTHESIAS: 0
DIARRHEA: 0
HEMATURIA: 0
SORE THROAT: 0
JOINT SWELLING: 0
NERVOUS/ANXIOUS: 0
HEADACHES: 0
COUGH: 0
EYE PAIN: 0
SHORTNESS OF BREATH: 0
WEAKNESS: 0
DYSURIA: 0

## 2021-09-14 ENCOUNTER — OFFICE VISIT (OUTPATIENT)
Dept: FAMILY MEDICINE | Facility: CLINIC | Age: 66
End: 2021-09-14
Payer: COMMERCIAL

## 2021-09-14 VITALS
WEIGHT: 217.3 LBS | HEART RATE: 53 BPM | HEIGHT: 71 IN | OXYGEN SATURATION: 97 % | TEMPERATURE: 97.1 F | DIASTOLIC BLOOD PRESSURE: 78 MMHG | RESPIRATION RATE: 16 BRPM | SYSTOLIC BLOOD PRESSURE: 125 MMHG | BODY MASS INDEX: 30.42 KG/M2

## 2021-09-14 DIAGNOSIS — I25.10 CORONARY ARTERY DISEASE INVOLVING NATIVE CORONARY ARTERY OF NATIVE HEART WITHOUT ANGINA PECTORIS: Chronic | ICD-10-CM

## 2021-09-14 DIAGNOSIS — Z00.00 ROUTINE GENERAL MEDICAL EXAMINATION AT A HEALTH CARE FACILITY: Primary | ICD-10-CM

## 2021-09-14 DIAGNOSIS — K76.0 NAFLD (NONALCOHOLIC FATTY LIVER DISEASE): Chronic | ICD-10-CM

## 2021-09-14 DIAGNOSIS — E78.5 HYPERLIPIDEMIA LDL GOAL <70: Chronic | ICD-10-CM

## 2021-09-14 DIAGNOSIS — I10 BENIGN ESSENTIAL HYPERTENSION: ICD-10-CM

## 2021-09-14 PROCEDURE — 84443 ASSAY THYROID STIM HORMONE: CPT | Performed by: INTERNAL MEDICINE

## 2021-09-14 PROCEDURE — 99214 OFFICE O/P EST MOD 30 MIN: CPT | Mod: 25 | Performed by: INTERNAL MEDICINE

## 2021-09-14 PROCEDURE — 99396 PREV VISIT EST AGE 40-64: CPT | Performed by: INTERNAL MEDICINE

## 2021-09-14 PROCEDURE — 80048 BASIC METABOLIC PNL TOTAL CA: CPT | Performed by: INTERNAL MEDICINE

## 2021-09-14 PROCEDURE — 36415 COLL VENOUS BLD VENIPUNCTURE: CPT | Performed by: INTERNAL MEDICINE

## 2021-09-14 ASSESSMENT — MIFFLIN-ST. JEOR: SCORE: 1801.92

## 2021-09-14 NOTE — PATIENT INSTRUCTIONS
(Z00.00) Routine general medical examination at a health care facility  (primary encounter diagnosis)  Comment: For routine exam, we reviewed labs as ordered, cholesterol, diabetes mellitus check, liver function, renal function, PSA.  We will also update vaccination history.  Plan:     (I10) Benign essential hypertension  Comment: blood pressure is well controlled today.  No changes in blood pressure medications.  We will recheck basic metabolic panel today  Plan: Basic metabolic panel  (Ca, Cl, CO2, Creat,         Gluc, K, Na, BUN)            (K76.0) NAFLD (nonalcoholic fatty liver disease)  Comment: Avoid alcohol.  Continue to work on diet and exercise  Plan:     (I25.10) Coronary artery disease involving native coronary artery of native heart without angina pectoris  Comment: Doing well with statin, metoprolol and aspirin.  Plan:     (E78.5) Hyperlipidemia LDL goal <70  Comment: Continue rosuvastatin   Plan:

## 2021-09-14 NOTE — PROGRESS NOTES
SUBJECTIVE:   CC: Manan Bryant is an 66 year old male who presents for preventative health visit.       Patient has been advised of split billing requirements and indicates understanding: Yes  Healthy Habits:     Getting at least 3 servings of Calcium per day:  NO    Bi-annual eye exam:  Yes    Dental care twice a year:  Yes    Sleep apnea or symptoms of sleep apnea:  None    Diet:  Low fat/cholesterol    Frequency of exercise:  1 day/week    Duration of exercise:  Less than 15 minutes    Taking medications regularly:  Yes    PHQ-2 Total Score: 0    Additional concerns today:  No      Today's PHQ-2 Score:   PHQ-2 ( 1999 [employer]) 9/13/2021   Q1: Little interest or pleasure in doing things 0   Q2: Feeling down, depressed or hopeless 0   PHQ-2 Score 0   Q1: Little interest or pleasure in doing things Not at all   Q2: Feeling down, depressed or hopeless Not at all   PHQ-2 Score 0       Abuse: Current or Past(Physical, Sexual or Emotional)- No  Do you feel safe in your environment? Yes    Have you ever done Advance Care Planning? (For example, a Health Directive, POLST, or a discussion with a medical provider or your loved ones about your wishes): No, advance care planning information given to patient to review.  Patient declined advance care planning discussion at this time.    Social History     Tobacco Use     Smoking status: Never Smoker     Smokeless tobacco: Never Used   Substance Use Topics     Alcohol use: No     Alcohol/week: 0.0 standard drinks     If you drink alcohol do you typically have >3 drinks per day or >7 drinks per week? Not applicable    Alcohol Use 9/14/2021   Prescreen: >3 drinks/day or >7 drinks/week? -   Prescreen: >3 drinks/day or >7 drinks/week? Not Applicable       Last PSA:   PSA   Date Value Ref Range Status   12/04/2019 0.63 0 - 4 ug/L Final     Comment:     Assay Method:  Chemiluminescence using Siemens Vista analyzer     Prostate Specific Antigen Screen   Date Value Ref Range Status    09/09/2021 0.64 0.00 - 4.00 ug/L Final       Reviewed orders with patient. Reviewed health maintenance and updated orders accordingly - Yes  Lab work is in process  Labs reviewed in EPIC    Reviewed and updated as needed this visit by clinical staff  Tobacco  Allergies  Meds              Reviewed and updated as needed this visit by Provider                Past Medical History:   Diagnosis Date     CAD (coronary artery disease)     cardiac cath 8/5/2010: E[facility]IOTT to LAD     Essential hypertension, benign      Family history of early CAD      History of acute anterior wall MI 2010 2010     History of colonic polyps      Mixed hyperlipidemia      Myocardial infarction (H)      Paroxysmal supraventricular tachycardia (H)      PVC (premature ventricular contraction)     Hx ventricular tachycardia during cardiac rehab 2010     SVT (supraventricular tachycardia) (H)      Syncope     episode 2016 - EP study - loop recorder implanted 2016 - non-functional after 3 years      Benign essential hypertension   Manan Bryant has been taking medications losartan, hydrochlorothiazide and metoprolol.  He is trying to avoid caffiene.  Today blood pressure is well controlled.  Average is usually 120's/80's.  He did have labs drawn that showed a rise in creatinine 1.30.  He is drinking sparkling water and diet coke.  He drinks no water.      Review of Systems  CONSTITUTIONAL: NEGATIVE for fever, chills, change in weight  INTEGUMENTARY/SKIN: NEGATIVE for worrisome rashes, moles or lesions  EYES: NEGATIVE for vision changes or irritation  ENT: NEGATIVE for ear, mouth and throat problems  RESP: NEGATIVE for significant cough or SOB  CV: NEGATIVE for chest pain, palpitations or peripheral edema  GI: NEGATIVE for nausea, abdominal pain, heartburn, or change in bowel habits   male: negative for dysuria, hematuria, decreased urinary stream,  MUSCULOSKELETAL: NEGATIVE for significant arthralgias or myalgia  NEURO: NEGATIVE for weakness,  "dizziness or paresthesias  PSYCHIATRIC: NEGATIVE for changes in mood or affect    OBJECTIVE:   /78 (BP Location: Right arm, Patient Position: Sitting, Cuff Size: Adult Large)   Pulse 53   Temp 97.1  F (36.2  C) (Temporal)   Resp 16   Ht 1.81 m (5' 11.26\")   Wt 98.6 kg (217 lb 4.8 oz)   SpO2 97%   BMI 30.09 kg/m      Physical Exam  GENERAL: healthy, alert and no distress  EYES: Eyes grossly normal to inspection, PERRL and conjunctivae and sclerae normal  HENT: ear canals and TM's normal, nose and mouth without ulcers or lesions  NECK: no adenopathy, no asymmetry, masses, or scars and thyroid normal to palpation  RESP: lungs clear to auscultation - no rales, rhonchi or wheezes  CV: regular rate and rhythm, normal S1 S2, no S3 or S4, no murmur, click or rub, no peripheral edema and peripheral pulses strong  ABDOMEN: soft, nontender, no hepatosplenomegaly, no masses and bowel sounds normal  : Prostate normal size, no nodules  MS: no gross musculoskeletal defects noted, no edema  SKIN: no suspicious lesions or rashes  NEURO: Normal strength and tone, mentation intact and speech normal  PSYCH: mentation appears normal, affect normal/bright    Diagnostic Test Results:  Labs reviewed in Epic    ASSESSMENT/PLAN:     Patient Instructions   (Z00.00) Routine general medical examination at a health care facility  (primary encounter diagnosis)  Comment: For routine exam, we reviewed labs as ordered, cholesterol, diabetes mellitus check, liver function, renal function, PSA.  We will also update vaccination history.  Plan:     (I10) Benign essential hypertension  Comment: blood pressure is well controlled today.  No changes in blood pressure medications.  We will recheck basic metabolic panel today  Plan: Basic metabolic panel  (Ca, Cl, CO2, Creat,         Gluc, K, Na, BUN)            (K76.0) NAFLD (nonalcoholic fatty liver disease)  Comment: Avoid alcohol.  Continue to work on diet and exercise  Plan:     (I25.10) " "Coronary artery disease involving native coronary artery of native heart without angina pectoris  Comment: Doing well with statin, metoprolol and aspirin.  Plan:     (E78.5) Hyperlipidemia LDL goal <70  Comment: Continue rosuvastatin   Plan:           Patient has been advised of split billing requirements and indicates understanding: Yes  COUNSELING:   Reviewed preventive health counseling, as reflected in patient instructions    Estimated body mass index is 30.09 kg/m  as calculated from the following:    Height as of this encounter: 1.81 m (5' 11.26\").    Weight as of this encounter: 98.6 kg (217 lb 4.8 oz).         He reports that he has never smoked. He has never used smokeless tobacco.      Counseling Resources:  ATP IV Guidelines  Pooled Cohorts Equation Calculator  FRAX Risk Assessment  ICSI Preventive Guidelines  Dietary Guidelines for Americans, 2010  USDA's MyPlate  ASA Prophylaxis  Lung CA Screening    Ric Villela MD, MD  [facility]  "

## 2021-09-15 LAB
ANION GAP SERPL CALCULATED.3IONS-SCNC: 5 MMOL/L (ref 3–14)
BUN SERPL-MCNC: 24 MG/DL (ref 7–30)
CALCIUM SERPL-MCNC: 8.5 MG/DL (ref 8.5–10.1)
CHLORIDE BLD-SCNC: 106 MMOL/L (ref 94–109)
CO2 SERPL-SCNC: 25 MMOL/L (ref 20–32)
CREAT SERPL-MCNC: 1.22 MG/DL (ref 0.66–1.25)
GFR SERPL CREATININE-BSD FRML MDRD: 62 ML/MIN/1.73M2
GLUCOSE BLD-MCNC: 102 MG/DL (ref 70–99)
POTASSIUM BLD-SCNC: 4.1 MMOL/L (ref 3.4–5.3)
SODIUM SERPL-SCNC: 136 MMOL/L (ref 133–144)
TSH SERPL DL<=0.005 MIU/L-ACNC: 2.74 MU/L (ref 0.4–4)

## 2021-09-16 NOTE — RESULT ENCOUNTER NOTE
Ismael Hinkle,    I have had the opportunity to review your recent results and an interpretation is as follows:  Your basic metabolic panel shows stable renal function and electrolytes  Your thyroid function also returned stable    Sincerely,  Ric Villela MD

## 2022-02-08 NOTE — TELEPHONE ENCOUNTER
Patient called requesting annual BMP, and FLP/ALT labs which he will do 8- in preparation of his Dr. Corley visit 8-.  Patient will also contact PCP regarding adding a A1C on to lab draw,    Orders entered.      Rapid response called prior to transport transferring pt to room to assess pt orientation level and/or uremic state.  v/s stable. New ABG/labs drawn per MD orders/pt transferred to CT for diagnostics via bed per transporter/house supervisor.

## 2022-04-06 ENCOUNTER — TRANSFERRED RECORDS (OUTPATIENT)
Dept: HEALTH INFORMATION MANAGEMENT | Facility: CLINIC | Age: 67
End: 2022-04-06
Payer: COMMERCIAL

## 2022-04-16 ENCOUNTER — HOSPITAL ENCOUNTER (OUTPATIENT)
Dept: MRI IMAGING | Facility: CLINIC | Age: 67
Discharge: HOME | End: 2022-04-16
Attending: ORTHOPAEDIC SURGERY | Admitting: ORTHOPAEDIC SURGERY
Payer: COMMERCIAL

## 2022-04-16 DIAGNOSIS — M25.562 LEFT KNEE PAIN: ICD-10-CM

## 2022-04-16 PROCEDURE — 73721 MRI JNT OF LWR EXTRE W/O DYE: CPT | Mod: LT

## 2022-04-16 PROCEDURE — 73721 MRI JNT OF LWR EXTRE W/O DYE: CPT | Mod: 26 | Performed by: RADIOLOGY

## 2022-05-06 ENCOUNTER — TRANSFERRED RECORDS (OUTPATIENT)
Dept: HEALTH INFORMATION MANAGEMENT | Facility: CLINIC | Age: 67
End: 2022-05-06
Payer: COMMERCIAL

## 2022-05-14 ENCOUNTER — E-VISIT (OUTPATIENT)
Dept: FAMILY MEDICINE | Facility: CLINIC | Age: 67
End: 2022-05-14
Payer: COMMERCIAL

## 2022-05-14 DIAGNOSIS — A69.20 LYME DISEASE: Primary | ICD-10-CM

## 2022-05-14 PROCEDURE — 99421 OL DIG E/M SVC 5-10 MIN: CPT | Performed by: INTERNAL MEDICINE

## 2022-05-15 RX ORDER — DOXYCYCLINE 100 MG/1
100 CAPSULE ORAL 2 TIMES DAILY
Qty: 28 CAPSULE | Refills: 0 | Status: SHIPPED | OUTPATIENT
Start: 2022-05-15 | End: 2022-05-29

## 2022-05-24 DIAGNOSIS — I10 BENIGN ESSENTIAL HYPERTENSION: ICD-10-CM

## 2022-05-24 DIAGNOSIS — I25.10 CORONARY ARTERY DISEASE INVOLVING NATIVE CORONARY ARTERY OF NATIVE HEART WITHOUT ANGINA PECTORIS: Chronic | ICD-10-CM

## 2022-05-24 DIAGNOSIS — E78.5 HYPERLIPIDEMIA LDL GOAL <70: Chronic | ICD-10-CM

## 2022-05-24 DIAGNOSIS — K21.9 GASTROESOPHAGEAL REFLUX DISEASE WITHOUT ESOPHAGITIS: ICD-10-CM

## 2022-05-24 RX ORDER — HYDROCHLOROTHIAZIDE 12.5 MG/1
12.5 TABLET ORAL DAILY
Qty: 90 TABLET | Refills: 3 | Status: CANCELLED | OUTPATIENT
Start: 2022-05-24

## 2022-05-24 RX ORDER — ROSUVASTATIN CALCIUM 40 MG/1
40 TABLET, COATED ORAL DAILY
Qty: 90 TABLET | Refills: 3 | Status: CANCELLED | OUTPATIENT
Start: 2022-05-24

## 2022-05-24 RX ORDER — METOPROLOL SUCCINATE 50 MG/1
50 TABLET, EXTENDED RELEASE ORAL DAILY
Qty: 90 TABLET | Refills: 3 | Status: CANCELLED | OUTPATIENT
Start: 2022-05-24

## 2022-05-25 DIAGNOSIS — K21.9 GASTROESOPHAGEAL REFLUX DISEASE WITHOUT ESOPHAGITIS: ICD-10-CM

## 2022-05-25 DIAGNOSIS — I10 BENIGN ESSENTIAL HYPERTENSION: ICD-10-CM

## 2022-05-25 DIAGNOSIS — E78.5 HYPERLIPIDEMIA LDL GOAL <70: Chronic | ICD-10-CM

## 2022-05-25 DIAGNOSIS — I25.10 CORONARY ARTERY DISEASE INVOLVING NATIVE CORONARY ARTERY OF NATIVE HEART WITHOUT ANGINA PECTORIS: Chronic | ICD-10-CM

## 2022-05-25 RX ORDER — METOPROLOL SUCCINATE 50 MG/1
50 TABLET, EXTENDED RELEASE ORAL DAILY
Qty: 90 TABLET | Refills: 0 | Status: SHIPPED | OUTPATIENT
Start: 2022-05-25 | End: 2022-08-18

## 2022-05-25 RX ORDER — ROSUVASTATIN CALCIUM 40 MG/1
40 TABLET, COATED ORAL DAILY
Qty: 90 TABLET | Refills: 0 | Status: SHIPPED | OUTPATIENT
Start: 2022-05-25 | End: 2022-08-18

## 2022-05-25 RX ORDER — HYDROCHLOROTHIAZIDE 12.5 MG/1
12.5 TABLET ORAL DAILY
Qty: 90 TABLET | Refills: 0 | Status: SHIPPED | OUTPATIENT
Start: 2022-05-25 | End: 2022-08-18

## 2022-06-12 ENCOUNTER — APPOINTMENT (OUTPATIENT)
Dept: CT IMAGING | Facility: CLINIC | Age: 67
End: 2022-06-12
Attending: EMERGENCY MEDICINE
Payer: COMMERCIAL

## 2022-06-12 ENCOUNTER — HOSPITAL ENCOUNTER (EMERGENCY)
Facility: CLINIC | Age: 67
Discharge: HOME | End: 2022-06-12
Attending: EMERGENCY MEDICINE | Admitting: EMERGENCY MEDICINE
Payer: COMMERCIAL

## 2022-06-12 VITALS
OXYGEN SATURATION: 97 % | BODY MASS INDEX: 30.38 KG/M2 | TEMPERATURE: 97.9 F | SYSTOLIC BLOOD PRESSURE: 135 MMHG | WEIGHT: 217 LBS | HEART RATE: 55 BPM | DIASTOLIC BLOOD PRESSURE: 65 MMHG | RESPIRATION RATE: 17 BRPM | HEIGHT: 71 IN

## 2022-06-12 DIAGNOSIS — S06.0X1A CONCUSSION WITH LOSS OF CONSCIOUSNESS OF 30 MINUTES OR LESS, INITIAL ENCOUNTER: ICD-10-CM
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
## 2022-06-12 PROCEDURE — 70450 CT HEAD/BRAIN W/O DYE: CPT

## 2022-06-12 PROCEDURE — 72125 CT NECK SPINE W/O DYE: CPT

## 2022-06-12 PROCEDURE — 99284 EMERGENCY DEPT VISIT MOD MDM: CPT | Mod: 25

## 2022-06-12 NOTE — ED PROVIDER NOTES
"  History   Chief Complaint:  Head Injury     The history is provided by the patient and medical records.      Manan Bryant is a 66 year old male with history of coronary artery disease, myocardial infarction, paroxysmal supraventricular tachycardia, and hypertension who presents with a head injury after a mechanical fall. The patient states he tripped and fell backwards, subsequently hitting his posterior head. He notes he had approximately 30 seconds of altered mental status, however this resolved. He denies loss of consciousness. He denies neck pain. He is not anticoagulated.     Review of Systems  10 point review of systems performed and is negative except as above and in HPI.    Allergies:  The patient has no known allergies.     Medications:  Aspirin  Hydrodiuril  Cozaar  Toprol  Nitrostat   Prilosec  Crestor     Past Medical History:     Coronary artery disease  Hypertension  Acute anterior wall myocardial infarction  Hyperlipidemia  Colonic polyps  Paroxysmal supraventricular tachycardia  Nonalcoholic steatohepatitis   GERD    Past Surgical History:    Colonoscopy  Coronary stent placement, LAD   Vasectomy   Heart monitor implantation     Family History:    Father - Coronary Artery Disease, Myocardial Infarction  Sister - Obesity, Sleep Apnea  Brother - Coronary Artery Disease, Myocardial Infarction     Social History:  The patient was unaccompanied to the emergency department.    Physical Exam     Patient Vitals for the past 24 hrs:   BP Temp Temp src Pulse Resp SpO2 Height Weight   06/12/22 1041 -- -- -- -- 17 -- -- --   06/12/22 1030 135/65 97.9  F (36.6  C) Oral 55 -- 97 % 1.803 m (5' 11\") 98.4 kg (217 lb)     Physical Exam  General: Resting on the gurney, appears comfortable  Head:  Hemtoma right posterior pariatal scalp.  The scalp, face, and head appear otherwise normal  Mouth/Throat: Mucus membranes are moist  CV:  Regular rate    Normal S1 and S2  No pathological murmur   Resp:  Breath sounds " clear and equal bilaterally    Non-labored, no retractions or accessory muscle use    No coarseness    No wheezing   GI:  Abdomen is soft, no rigidity    No tenderness to palpation  MS:  Normal motor assessment of all extremities.    Good capillary refill noted.  Skin:  No rash or lesions noted.  Neuro: Cranial nerves 2/12intact.  Sensation and strength intact x4. Speech is normal and fluent. No apparent deficit.  Psych:  Awake. Alert.  Normal affect.      Appropriate interactions.      Emergency Department Course   Imaging:  Cervical spine CT w/o contrast   Final Result   IMPRESSION:      HEAD CT:   1. No acute intracranial abnormality.      CERVICAL SPINE CT:   1.  No acute traumatic injury of the cervical spine.       CT Head w/o Contrast   Final Result   IMPRESSION:      HEAD CT:   1. No acute intracranial abnormality.      CERVICAL SPINE CT:   1.  No acute traumatic injury of the cervical spine.       Report per radiology    Emergency Department Course:     Reviewed:  I reviewed nursing notes, vitals, past medical history and Care Everywhere    Assessments:  1439 I obtained history and examined the patient as noted above.   1612 I rechecked the patient and explained findings.    Disposition:  The patient was discharged to home.     Impression & Plan   Medical Decision Making:  Manan Bryant is a 66 year old male who presents for evaluation following a head injury.  The injury was mechanical in nature.  The patient is not on any blood thinners.  The patient has no neurologic deficit.  The patient had no episodes of vomiting. Imaging was discussed and obtained, based on risk stratification, patient comfort, and symptoms. Gven the mechanism of the injury, the lack of focal neurologic deficit, no LOC, no seizure activity, and negative imaging, I believe serious cranial pathology is unlikely. The patient  was counseled regarding post-concussive syndrome including cognitive, behavioral and emotional aspects. They  were also instructed to refrain from any contact sports until symptom free for at least two weeks or until cleared by a primary care provider. Additionally, the patient is not to participate in strenuous activities for one week or until cleared by a primary care provider. The patient is comfortable with discharge home and out-patient follow up.     Diagnosis:    ICD-10-CM    1. Fall, initial encounter  W19.XXXA    2. Concussion with loss of consciousness of 30 minutes or less, initial encounter  S06.0X1A      Scribe Disclosure:  I, Keisha Cordova, am serving as a scribe at 3:10 PM on 6/12/2022 to document services personally performed by Darcy Salcido MD based on my observations and the provider's statements to me.     Darcy Salcido MD  06/12/22 2121

## 2022-06-12 NOTE — ED TRIAGE NOTES
Pt tripped and fell backwards. Hit the back of his head. When this happened pt states he had 30 seconds of thinking he was moving his hands but he wasn't. Pt states that he had no LOC. Pt has no neuro symptoms. Moving all extremities. Speech clear. Pt placed in c-collar in triage, no neck pain at this time. Pt A&Ox4     Triage Assessment     Row Name 06/12/22 1041       Triage Assessment (Adult)    Airway WDL WDL       Respiratory WDL    Respiratory WDL WDL       Skin Circulation/Temperature WDL    Skin Circulation/Temperature WDL WDL       Cardiac WDL    Cardiac WDL WDL       Peripheral/Neurovascular WDL    Peripheral Neurovascular WDL WDL       Cognitive/Neuro/Behavioral WDL    Cognitive/Neuro/Behavioral WDL X  trip and fall backwards. hit his head. no on thinners. no LOC    Level of Consciousness alert    Arousal Level opens eyes spontaneously    Orientation oriented x 4    Speech clear;spontaneous;logical

## 2022-08-18 ENCOUNTER — MYC MEDICAL ADVICE (OUTPATIENT)
Dept: FAMILY MEDICINE | Facility: CLINIC | Age: 67
End: 2022-08-18

## 2022-08-18 ENCOUNTER — MYC MEDICAL ADVICE (OUTPATIENT)
Dept: CARDIOLOGY | Facility: CLINIC | Age: 67
End: 2022-08-18

## 2022-08-18 ENCOUNTER — TELEPHONE (OUTPATIENT)
Dept: CARDIOLOGY | Facility: CLINIC | Age: 67
End: 2022-08-18

## 2022-08-18 DIAGNOSIS — E78.5 HYPERLIPIDEMIA LDL GOAL <70: Chronic | ICD-10-CM

## 2022-08-18 DIAGNOSIS — K21.9 GASTROESOPHAGEAL REFLUX DISEASE WITHOUT ESOPHAGITIS: ICD-10-CM

## 2022-08-18 DIAGNOSIS — I10 BENIGN ESSENTIAL HYPERTENSION: ICD-10-CM

## 2022-08-18 DIAGNOSIS — I25.10 CORONARY ARTERY DISEASE INVOLVING NATIVE CORONARY ARTERY OF NATIVE HEART WITHOUT ANGINA PECTORIS: Chronic | ICD-10-CM

## 2022-08-18 RX ORDER — ROSUVASTATIN CALCIUM 40 MG/1
40 TABLET, COATED ORAL DAILY
Qty: 90 TABLET | Refills: 1 | Status: SHIPPED | OUTPATIENT
Start: 2022-08-18 | End: 2023-02-03

## 2022-08-18 RX ORDER — METOPROLOL SUCCINATE 50 MG/1
50 TABLET, EXTENDED RELEASE ORAL DAILY
Qty: 90 TABLET | Refills: 1 | Status: SHIPPED | OUTPATIENT
Start: 2022-08-18 | End: 2023-02-21

## 2022-08-18 RX ORDER — LOSARTAN POTASSIUM 100 MG/1
100 TABLET ORAL DAILY
Qty: 90 TABLET | Refills: 1 | Status: SHIPPED | OUTPATIENT
Start: 2022-08-18 | End: 2023-02-03

## 2022-08-18 RX ORDER — HYDROCHLOROTHIAZIDE 12.5 MG/1
12.5 TABLET ORAL DAILY
Qty: 90 TABLET | Refills: 1 | Status: SHIPPED | OUTPATIENT
Start: 2022-08-18 | End: 2023-01-03

## 2022-08-18 NOTE — TELEPHONE ENCOUNTER
Prescription refills requested -   The prescriptions I need added are:  metopolol (ER 50 mg)  hydrochlorothiazide (12.5 mg)  rosuvastatin (40 mg)  omeprazole (20 mg dr capsule)  losartin (100 mg tablet)    I need these at [facility], which is the one at [address] [place] [address].    Last 9-2-21 with [facility].  Next Dr. Corley OV 1-3-23.     Refills issued.

## 2022-08-18 NOTE — TELEPHONE ENCOUNTER
Prescription refills requested -   The prescriptions I need added are:  metopolol (ER 50 mg)  hydrochlorothiazide (12.5 mg)  rosuvastatin (40 mg)  omeprazole (20 mg dr capsule)  losartin (100 mg tablet)    I need these at the [facility], which is the one at [address] [place] [address].    Last 9-2-21 with [facility].  Next Dr. Corley OV 1-3-23.

## 2022-08-22 NOTE — TELEPHONE ENCOUNTER
Please see patient's MyChart request for refill of Omeprazole.     Routing refill request to provider for review/approval because:  Medication last refilled by a different provider.     Pharmacy pended. Patient has upcoming appointment with Dr. Villela on 10/18/2022.      Appointments in Next Year    Oct 18, 2022  7:30 AM  (Arrive by 7:10 AM)  Provider Visit with Ric Villela MD  [facility] ([facility] ) [phone]   Dec 07, 2022  3:00 PM  (Arrive by 2:40 PM)  PHYSICAL with Ric Villela MD  [facility] ([facility] ) [phone]   Jan 03, 2023  7:45 AM  Return Cardiology with Abbe Corley MD  [facility] ([facility] ) [phone]        Shahrzad Amaro RN BSN MSN  [facility]

## 2022-09-26 ENCOUNTER — MYC MEDICAL ADVICE (OUTPATIENT)
Dept: CARDIOLOGY | Facility: CLINIC | Age: 67
End: 2022-09-26

## 2022-09-26 DIAGNOSIS — R06.09 DYSPNEA ON EXERTION: ICD-10-CM

## 2022-09-26 DIAGNOSIS — I25.10 CORONARY ARTERY DISEASE INVOLVING NATIVE CORONARY ARTERY OF NATIVE HEART WITHOUT ANGINA PECTORIS: Primary | ICD-10-CM

## 2022-09-26 NOTE — TELEPHONE ENCOUNTER
Reply from Dr. Corley:  Abbe Corley MD  [facility] Heart Team 2  His symptoms sound infectious but not Covid. If he thinks it is reminiscent of MI, schedule a stress echo. Thanks     Order placed for stress echo    1700 called patient to review Dr. Corley' plan for a stress echo. Patient states he will call Central Scheduling to set up an appointment. Patient notes that his knee has been bothering him and he will call for a change order to a lexiscan nuclear study if it is not improving in time to walk the stress echo treadmill.    Message sent to scheduling team.

## 2022-09-26 NOTE — TELEPHONE ENCOUNTER
My chart message from patient:  Daquan Bryant Silva [facility] Heart Team 2  I had an experience over the weekend where I had significant body aches, a feeling of chills, stomach upset, and significant fatigue.  My BP spiked at 154/80.     I had my thoughts focused on Covid-19, and took two PCR tests 24 hours apart and both were negative.     However, upon reflection, these are very similar symptoms to when I had my heart attack.  If I had made the connection earlier, I would have gone to the ER.     The symptoms have now passed, but I think that I should be checked or have some follow-up diagnostics.  I am wondering if I should come in for a exam or possibly schedule something like a stress-echo to see if anything is going on.     Thoughts?     Thank you!     Daquan Bryant       Patient was due for annual follow up this month. Scheduled to see Dr. Corley on 1/3/2023.  Patient has a history of CAD/MI 2010, PVCs    1000 called patient to discuss: he states his only other symptoms recently are a chronic non-productive cough x 3 months and 3-4 weeks of stomach pain that he attributes to his hernia. Patient denies any chest pain or palpitations.  Patient notes that his fall physical at [facility] was doing to include a stress echo and he is hoping Dr. Corley will order that for him now.    Will message Dr. Corley to review

## 2022-09-29 NOTE — TELEPHONE ENCOUNTER
Pt unable to exercise for stress echo, requesting alternative. Dr Corley recommended stress MRI. Order placed and patient updated, scheduling information provided.

## 2022-10-18 ENCOUNTER — OFFICE VISIT (OUTPATIENT)
Dept: FAMILY MEDICINE | Facility: CLINIC | Age: 67
End: 2022-10-18
Payer: MEDICARE

## 2022-10-18 VITALS
RESPIRATION RATE: 16 BRPM | WEIGHT: 223 LBS | OXYGEN SATURATION: 97 % | SYSTOLIC BLOOD PRESSURE: 128 MMHG | DIASTOLIC BLOOD PRESSURE: 81 MMHG | BODY MASS INDEX: 31.22 KG/M2 | HEART RATE: 53 BPM | HEIGHT: 71 IN

## 2022-10-18 DIAGNOSIS — R05.9 COUGH, UNSPECIFIED TYPE: Primary | ICD-10-CM

## 2022-10-18 DIAGNOSIS — Z12.11 SCREEN FOR COLON CANCER: ICD-10-CM

## 2022-10-18 DIAGNOSIS — I10 ESSENTIAL HYPERTENSION, BENIGN: ICD-10-CM

## 2022-10-18 DIAGNOSIS — K21.9 GASTROESOPHAGEAL REFLUX DISEASE WITHOUT ESOPHAGITIS: ICD-10-CM

## 2022-10-18 PROCEDURE — 99214 OFFICE O/P EST MOD 30 MIN: CPT | Mod: 25 | Performed by: INTERNAL MEDICINE

## 2022-10-18 PROCEDURE — 90677 PCV20 VACCINE IM: CPT | Performed by: INTERNAL MEDICINE

## 2022-10-18 PROCEDURE — G0009 ADMIN PNEUMOCOCCAL VACCINE: HCPCS | Performed by: INTERNAL MEDICINE

## 2022-10-18 RX ORDER — ALBUTEROL SULFATE 90 UG/1
2 AEROSOL, METERED RESPIRATORY (INHALATION) EVERY 4 HOURS PRN
Qty: 18 G | Refills: 3 | Status: SHIPPED | OUTPATIENT
Start: 2022-10-18 | End: 2023-01-03

## 2022-10-18 ASSESSMENT — PAIN SCALES - GENERAL: PAINLEVEL: NO PAIN (0)

## 2022-10-18 NOTE — PROGRESS NOTES
"      Garcia Butt is a 67 year old, presenting for the following health issues:  Chronic Cough      HPI     Chronic cough   Manan Bryant has had onset of chronic cough for the past few months.  Incidentally had upset stomach chills, body aches and fever about 3 weeks ago which mimicked symptoms of myocardial infarction 12 years ago.  He identified the symptoms after they dissipated and discussed with cardiology.  Since the episode has had no shortness of breath, and no fatigue.  He is limited in exertion due to knee pain secondary to osteoarthritis and thus has not been as active as he has been sooner.  He tested negative for COVID  Gastroesophageal reflux disease without esophagitis   Had upper endoscopy in 2019.  Taking daily proton pump inhibitor.  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         Review of Systems   Constitutional, HEENT, cardiovascular, pulmonary, gi and gu systems are negative, except as otherwise noted.      Objective    /81 (BP Location: Left arm, Patient Position: Sitting, Cuff Size: Adult Large)   Pulse 53   Resp 16   Ht 1.803 m (5' 11\")   Wt 101.2 kg (223 lb)   SpO2 97%   BMI 31.10 kg/m    Body mass index is 31.1 kg/m .  Physical Exam   GENERAL: healthy, alert and no distress  EYES: Eyes grossly normal to inspection,   NECK: no adenopathy, no asymmetry, masses,   RESP: lungs clear to auscultation - no rales, rhonchi or wheezes  CV: regular rate and rhythm, normal S1 S2, no S3 or S4, no murmur,   no peripheral edema  ABDOMEN: soft, nontender, no hepatosplenomegaly, no masses and bowel sounds normal  MS: no gross musculoskeletal defects noted, no edema  SKIN: no suspicious lesions or rashes  NEURO: Normal strength and tone, mentation intact and speech normal  PSYCH: mentation appears normal, affect normal/bright      Patient Instructions   (Z12.11) Screen for colon cancer  (primary encounter diagnosis)  Comment: We will obtain records from [facility] [phone] " [phone] and try to update colonoscopy screening  Plan:     (I10) Essential hypertension, benign  Comment: blood pressure is stable and no changes today  Plan:     (K21.9) Gastroesophageal reflux disease without esophagitis  Comment: Refill omeprazole 20 mg daily for the year- upper endoscopy reviewed 2019  Plan: omeprazole (PRILOSEC) 20 MG DR capsule            (R05.9) Cough, unspecified type  Comment: Plan for upcoming cardiac MRI which will also evaluate lungs.  Consider pulmonary function testing.  We could consider a trial of albuterol to be taken 2 puff every 4 hours as needed as therapeutic trial  Plan:

## 2022-10-18 NOTE — PATIENT INSTRUCTIONS
(Z12.11) Screen for colon cancer  (primary encounter diagnosis)  Comment: We will obtain records from [facility] [phone] and try to update colonoscopy screening  Plan:     (I10) Essential hypertension, benign  Comment: blood pressure is stable and no changes today  Plan:     (K21.9) Gastroesophageal reflux disease without esophagitis  Comment: Refill omeprazole 20 mg daily for the year- upper endoscopy reviewed 2019  Plan: omeprazole (PRILOSEC) 20 MG DR capsule            (R05.9) Cough, unspecified type  Comment: Plan for upcoming cardiac MRI which will also evaluate lungs.  Consider pulmonary function testing.  We could consider a trial of albuterol to be taken 2 puff every 4 hours as needed as therapeutic trial  Plan:

## 2022-10-18 NOTE — NURSING NOTE
Prior to immunization administration, verified patients identity using patient s name and date of birth. Please see Immunization Activity for additional information.     Screening Questionnaire for Adult Immunization    Are you sick today?   No   Do you have allergies to medications, food, a vaccine component or latex?   No   Have you ever had a serious reaction after receiving a vaccination?   No   Do you have a long-term health problem with heart, lung, kidney, or metabolic disease (e.g., diabetes), asthma, a blood disorder, no spleen, complement component deficiency, a cochlear implant, or a spinal fluid leak?  Are you on long-term aspirin therapy?   No   Do you have cancer, leukemia, HIV/AIDS, or any other immune system problem?   No   Do you have a parent, brother, or sister with an immune system problem?   No   In the past 3 months, have you taken medications that affect  your immune system, such as prednisone, other steroids, or anticancer drugs; drugs for the treatment of rheumatoid arthritis, Crohn s disease, or psoriasis; or have you had radiation treatments?   No   Have you had a seizure, or a brain or other nervous system problem?   No   During the past year, have you received a transfusion of blood or blood    products, or been given immune (gamma) globulin or antiviral drug?   No   For women: Are you pregnant or is there a chance you could become       pregnant during the next month?      Have you received any vaccinations in the past 4 weeks?   Yes     Immunization questionnaire was positive for at least one answer.  Notified provider.        Per orders of Dr. Villela, injection of Prevnar 20 given by Lakisha Snider MA. Patient instructed to remain in clinic for 15 minutes afterwards, and to report any adverse reaction to me immediately.       Screening performed by Lakisha Snider MA on 10/18/2022 at 8:29 AM.

## 2022-10-22 ENCOUNTER — HEALTH MAINTENANCE LETTER (OUTPATIENT)
Age: 67
End: 2022-10-22

## 2022-11-02 ENCOUNTER — HOSPITAL ENCOUNTER (OUTPATIENT)
Dept: CARDIOLOGY | Facility: CLINIC | Age: 67
Discharge: HOME | End: 2022-11-02
Attending: INTERNAL MEDICINE | Admitting: INTERNAL MEDICINE
Payer: MEDICARE

## 2022-11-02 ENCOUNTER — TELEPHONE (OUTPATIENT)
Dept: CARDIOLOGY | Facility: CLINIC | Age: 67
End: 2022-11-02

## 2022-11-02 VITALS — HEART RATE: 62 BPM | SYSTOLIC BLOOD PRESSURE: 120 MMHG | DIASTOLIC BLOOD PRESSURE: 52 MMHG

## 2022-11-02 DIAGNOSIS — I25.10 CORONARY ARTERY DISEASE INVOLVING NATIVE CORONARY ARTERY OF NATIVE HEART WITHOUT ANGINA PECTORIS: ICD-10-CM

## 2022-11-02 DIAGNOSIS — R06.09 DYSPNEA ON EXERTION: ICD-10-CM

## 2022-11-02 PROCEDURE — G1010 CDSM STANSON: HCPCS | Performed by: INTERNAL MEDICINE

## 2022-11-02 PROCEDURE — A9585 GADOBUTROL INJECTION: HCPCS | Performed by: INTERNAL MEDICINE

## 2022-11-02 PROCEDURE — 93017 CV STRESS TEST TRACING ONLY: CPT

## 2022-11-02 PROCEDURE — 250N000011 HC RX IP 250 OP 636: Performed by: INTERNAL MEDICINE

## 2022-11-02 PROCEDURE — 255N000002 HC RX 255 OP 636: Performed by: INTERNAL MEDICINE

## 2022-11-02 PROCEDURE — 93016 CV STRESS TEST SUPVJ ONLY: CPT | Performed by: INTERNAL MEDICINE

## 2022-11-02 PROCEDURE — 93018 CV STRESS TEST I&R ONLY: CPT | Performed by: INTERNAL MEDICINE

## 2022-11-02 PROCEDURE — G1010 CDSM STANSON: HCPCS

## 2022-11-02 PROCEDURE — 75563 CARD MRI W/STRESS IMG & DYE: CPT | Mod: 26 | Performed by: INTERNAL MEDICINE

## 2022-11-02 RX ORDER — CAFFEINE CITRATE 20 MG/ML
60 SOLUTION INTRAVENOUS
Status: DISCONTINUED | OUTPATIENT
Start: 2022-11-02 | End: 2022-11-03 | Stop reason: HOSPADM

## 2022-11-02 RX ORDER — ONDANSETRON 2 MG/ML
4 INJECTION INTRAMUSCULAR; INTRAVENOUS
Status: DISCONTINUED | OUTPATIENT
Start: 2022-11-02 | End: 2022-11-03 | Stop reason: HOSPADM

## 2022-11-02 RX ORDER — METHYLPREDNISOLONE SODIUM SUCCINATE 125 MG/2ML
125 INJECTION, POWDER, LYOPHILIZED, FOR SOLUTION INTRAMUSCULAR; INTRAVENOUS
Status: DISCONTINUED | OUTPATIENT
Start: 2022-11-02 | End: 2022-11-03 | Stop reason: HOSPADM

## 2022-11-02 RX ORDER — ACYCLOVIR 200 MG/1
0-1 CAPSULE ORAL
Status: DISCONTINUED | OUTPATIENT
Start: 2022-11-02 | End: 2022-11-03 | Stop reason: HOSPADM

## 2022-11-02 RX ORDER — REGADENOSON 0.08 MG/ML
0.4 INJECTION, SOLUTION INTRAVENOUS ONCE
Status: COMPLETED | OUTPATIENT
Start: 2022-11-02 | End: 2022-11-02

## 2022-11-02 RX ORDER — GADOBUTROL 604.72 MG/ML
26 INJECTION INTRAVENOUS ONCE
Status: COMPLETED | OUTPATIENT
Start: 2022-11-02 | End: 2022-11-02

## 2022-11-02 RX ORDER — DIPHENHYDRAMINE HYDROCHLORIDE 50 MG/ML
25-50 INJECTION INTRAMUSCULAR; INTRAVENOUS
Status: DISCONTINUED | OUTPATIENT
Start: 2022-11-02 | End: 2022-11-03 | Stop reason: HOSPADM

## 2022-11-02 RX ORDER — DIAZEPAM 5 MG
5 TABLET ORAL EVERY 30 MIN PRN
Status: DISCONTINUED | OUTPATIENT
Start: 2022-11-02 | End: 2022-11-03 | Stop reason: HOSPADM

## 2022-11-02 RX ORDER — AMINOPHYLLINE 25 MG/ML
100 INJECTION, SOLUTION INTRAVENOUS ONCE
Status: DISCONTINUED | OUTPATIENT
Start: 2022-11-02 | End: 2022-11-03 | Stop reason: HOSPADM

## 2022-11-02 RX ORDER — DIPHENHYDRAMINE HCL 25 MG
25 CAPSULE ORAL
Status: DISCONTINUED | OUTPATIENT
Start: 2022-11-02 | End: 2022-11-03 | Stop reason: HOSPADM

## 2022-11-02 RX ORDER — ALBUTEROL SULFATE 90 UG/1
2 AEROSOL, METERED RESPIRATORY (INHALATION) EVERY 5 MIN PRN
Status: DISCONTINUED | OUTPATIENT
Start: 2022-11-02 | End: 2022-11-03 | Stop reason: HOSPADM

## 2022-11-02 RX ADMIN — REGADENOSON 0.4 MG: 0.08 INJECTION, SOLUTION INTRAVENOUS at 15:24

## 2022-11-02 RX ADMIN — GADOBUTROL 26 ML: 604.72 INJECTION INTRAVENOUS at 15:52

## 2022-11-02 NOTE — TELEPHONE ENCOUNTER
Stress MRI completed as below, recommended by Dr Corley 9/26/22 for patient reports of significant fatigue and flu-like symptoms similar to those he experienced before his heart attack. Next follow up is in January.     PCP note 10/18/22-  Cough, unspecified type  Comment: Plan for upcoming cardiac MRI which will also evaluate lungs.  Consider pulmonary function testing.  We could consider a trial of albuterol to be taken 2 puff every 4 hours as needed as therapeutic trial.      Stress MRI-  Normal biventricular size with normal systolic function. Quantitative LVEF 63 %. Quantitative RVEF 57 %.   Regadenoson induced stress perfusion is negative for ischemia.   Delayed hyperenhancement reveals a small, subendocardial scar in the apex and apicoseptal portion of the  left ventricle consistent with a small scar in the LAD distribution.   Smiley: Dr Linder

## 2022-11-03 NOTE — TELEPHONE ENCOUNTER
Abbe Corley MD  You 14 hours ago (5:53 PM)     Reassure the patient that no ischemia is noted.  Continue current medical regiment.        Left message for patient to call back.     Addendum 0913: Spoke to patient, reviewed stress test results and message from Dr Corley. Pt verbalized understanding. Confirmed follow up scheduled for January, but he will call sooner if anything comes up.

## 2023-01-03 ENCOUNTER — OFFICE VISIT (OUTPATIENT)
Dept: CARDIOLOGY | Facility: CLINIC | Age: 68
End: 2023-01-03
Payer: MEDICARE

## 2023-01-03 VITALS
HEIGHT: 72 IN | SYSTOLIC BLOOD PRESSURE: 115 MMHG | WEIGHT: 223 LBS | HEART RATE: 51 BPM | DIASTOLIC BLOOD PRESSURE: 73 MMHG | BODY MASS INDEX: 30.2 KG/M2

## 2023-01-03 DIAGNOSIS — R73.02 GLUCOSE INTOLERANCE (IMPAIRED GLUCOSE TOLERANCE): Primary | ICD-10-CM

## 2023-01-03 DIAGNOSIS — I47.29 NSVT (NONSUSTAINED VENTRICULAR TACHYCARDIA) (H): ICD-10-CM

## 2023-01-03 DIAGNOSIS — E78.5 HYPERLIPIDEMIA LDL GOAL <70: Chronic | ICD-10-CM

## 2023-01-03 DIAGNOSIS — E78.6 HDL DEFICIENCY: Chronic | ICD-10-CM

## 2023-01-03 DIAGNOSIS — E66.09 CLASS 1 OBESITY DUE TO EXCESS CALORIES WITHOUT SERIOUS COMORBIDITY WITH BODY MASS INDEX (BMI) OF 30.0 TO 30.9 IN ADULT: ICD-10-CM

## 2023-01-03 DIAGNOSIS — I49.3 PVC (PREMATURE VENTRICULAR CONTRACTION): ICD-10-CM

## 2023-01-03 DIAGNOSIS — I10 BENIGN ESSENTIAL HYPERTENSION: ICD-10-CM

## 2023-01-03 DIAGNOSIS — I25.10 CORONARY ARTERY DISEASE INVOLVING NATIVE CORONARY ARTERY OF NATIVE HEART WITHOUT ANGINA PECTORIS: Chronic | ICD-10-CM

## 2023-01-03 DIAGNOSIS — E66.811 CLASS 1 OBESITY DUE TO EXCESS CALORIES WITHOUT SERIOUS COMORBIDITY WITH BODY MASS INDEX (BMI) OF 30.0 TO 30.9 IN ADULT: ICD-10-CM

## 2023-01-03 PROCEDURE — 99215 OFFICE O/P EST HI 40 MIN: CPT | Performed by: INTERNAL MEDICINE

## 2023-01-03 RX ORDER — HYDROCHLOROTHIAZIDE 25 MG/1
25 TABLET ORAL DAILY
Qty: 90 TABLET | Refills: 4 | Status: SHIPPED | OUTPATIENT
Start: 2023-01-03 | End: 2024-02-28

## 2023-01-03 NOTE — LETTER
1/3/2023    Ric Villela MD, MD  [address]  [place] [address]    RE: Manan Bryant       Dear Colleague,     I had the pleasure of seeing Manan Bryant in the [facility].  HPI and Plan:   See dictation    Today's clinic visit entailed:  Review of external notes as documented elsewhere in note  Review of the result(s) of each unique test - MRI, lab work, [place] stress echo  Ordering of each unique test  Prescription drug management  45 minutes spent on the date of the encounter doing chart review, history and exam, documentation and further activities per the note  Provider  Link to [employer] Help Grid     The level of medical decision making during this visit was of moderate complexity.      Orders Placed This Encounter   Procedures     Basic metabolic panel     Hemoglobin A1c     Follow-Up with Cardiology TYREL     Follow-Up with Cardiologist       Orders Placed This Encounter   Medications     hydrochlorothiazide (HYDRODIURIL) 25 MG tablet     Sig: Take 1 tablet (25 mg) by mouth daily     Dispense:  90 tablet     Refill:  4       Medications Discontinued During This Encounter   Medication Reason     albuterol (PROAIR HFA/PROVENTIL HFA/VENTOLIN HFA) 108 (90 Base) MCG/ACT inhaler Stopped by Patient     hydrochlorothiazide (HYDRODIURIL) 12.5 MG tablet          Encounter Diagnoses   Name Primary?     Coronary artery disease involving native coronary artery of native heart without angina pectoris      Benign essential hypertension      Hyperlipidemia LDL goal <70      Glucose intolerance (impaired glucose tolerance) Yes     HDL deficiency      PVC (premature ventricular contraction)      NSVT (nonsustained ventricular tachycardia)      Class 1 obesity due to excess calories without serious comorbidity with body mass index (BMI) of 30.0 to 30.9 in adult        CURRENT MEDICATIONS:  Current Outpatient Medications   Medication Sig Dispense Refill     ASPIRIN PO Take 81 mg by mouth  daily       Cholecalciferol (VITAMIN D PO) Take 1,000 mg by mouth daily        hydrochlorothiazide (HYDRODIURIL) 25 MG tablet Take 1 tablet (25 mg) by mouth daily 90 tablet 4     losartan (COZAAR) 100 MG tablet Take 1 tablet (100 mg) by mouth daily 90 tablet 1     metoprolol succinate ER (TOPROL XL) 50 MG 24 hr tablet Take 1 tablet (50 mg) by mouth daily 90 tablet 1     Multiple Vitamin (MULTI-VITAMIN PO) Take by mouth daily        omeprazole (PRILOSEC) 20 MG DR capsule Take 1 capsule (20 mg) by mouth daily 90 capsule 3     rosuvastatin (CRESTOR) 40 MG tablet Take 1 tablet (40 mg) by mouth daily 90 tablet 1     nitroGLYcerin (NITROSTAT) 0.4 MG sublingual tablet Place 1 tablet (0.4 mg) under the tongue every 5 minutes as needed for chest pain (Patient not taking: Reported on 1/3/2023) 25 tablet 3       ALLERGIES   No Known Allergies    PAST MEDICAL HISTORY:  Past Medical History:   Diagnosis Date     CAD (coronary artery disease)     cardiac cath 8/5/2010: ELLIOTT to LAD     Essential hypertension, benign      Family history of early CAD      History of acute anterior wall MI 2010 2010     History of colonic polyps      Mixed hyperlipidemia      Myocardial infarction (H)      Paroxysmal supraventricular tachycardia (H)      PVC (premature ventricular contraction)     Hx ventricular tachycardia during cardiac rehab 2010     SVT (supraventricular tachycardia) (H)      Syncope     episode 2016 - EP study - loop recorder implanted 2016 - non-functional after 3 years       PAST SURGICAL HISTORY:  Past Surgical History:   Procedure Laterality Date     COLONOSCOPY       STENT, CORONARY, EMEKA  8/2010    LAD       FAMILY HISTORY:  Family History   Problem Relation Age of Onset     Coronary Artery Disease Father      Myocardial Infarction Father      Coronary Artery Disease Brother      Myocardial Infarction Brother      Heart Surgery Brother         bypass     Coronary Artery Disease Paternal Grandfather      Myocardial  Infarction Paternal Grandfather      Sleep Apnea Sister      Family History Negative Mother      Family History Negative Maternal Grandmother      Heart Failure Maternal Grandfather      Family History Negative Paternal Grandmother      Myocardial Infarction Brother      Coronary Artery Disease Brother      Heart Surgery Brother         bypass     Coronary Artery Disease Brother      Obesity Sister      Obesity Brother      Obesity Brother        SOCIAL HISTORY:  Social History     Socioeconomic History     Marital status:      Spouse name: None     Number of children: None     Years of education: None     Highest education level: None   Tobacco Use     Smoking status: Never     Smokeless tobacco: Never   Substance and Sexual Activity     Alcohol use: No     Alcohol/week: 0.0 standard drinks     Drug use: No     Sexual activity: Not Currently     Partners: Female     Birth control/protection: Male Surgical   Other Topics Concern     Parent/sibling w/ CABG, MI or angioplasty before 65F 55M? Yes     Comment: Both brothers - one in his early 40's.  Both have repeated.     Caffeine Concern No     Comment: decaf: 1-2 cups a day. Diek coke: 4-5 cans a day     Sleep Concern No     Stress Concern No     Weight Concern No     Special Diet Yes     Comment: limited, heart healthy     Exercise Yes     Comment: bike outside, 1-3x a week     Bike Helmet Yes     Seat Belt Yes   Social History Narrative    ,    Retired - continues        Review of Systems:  Skin:  Negative       Eyes:  Positive for glasses    ENT:  Negative      Respiratory:  Negative       Cardiovascular:  Negative      Gastroenterology: Positive for reflux    Genitourinary:  Negative      Musculoskeletal:  Positive for arthritis    Neurologic:  Negative      Psychiatric:  Negative      Heme/Lymph/Imm:  Negative      Endocrine:  Positive for   pre-diabetic    Physical Exam:  Vitals: /73 (BP Location: Left arm, Cuff Size:  "Adult Large)   Pulse 51   Ht 1.816 m (5' 11.5\")   Wt 101.2 kg (223 lb)   BMI 30.67 kg/m      Constitutional:  cooperative, alert and oriented, well developed, well nourished, in no acute distress obese      Skin:  warm and dry to the touch, no apparent skin lesions or masses noted          Head:  normocephalic, no masses or lesions        Eyes:           Lymph:      ENT:  no pallor or cyanosis, dentition good        Neck:  carotid pulses are full and equal bilaterally;no carotid bruit        Respiratory:  normal breath sounds, clear to auscultation, normal A-P diameter, normal symmetry, normal respiratory excursion, no use of accessory muscles         Cardiac: regular rhythm;normal S1 and S2;no S3 or S4;no murmurs, gallops or rubs detected                pulses full and equal                                        GI:  not assessed this visit        Extremities and Muscular Skeletal:  no edema;no spinal abnormalities noted;normal muscle strength and tone              Neurological:  no gross motor deficits        Psych:  affect appropriate, oriented to time, person and place        CC  Tristen Padilla PA-C  [address] [place] [address]                Service Date: 01/03/2023    Mr. Bryant is a very nice 67-year-old gentleman who I originally met in 2010 when he presented with an acute inferior wall myocardial infarction.  We performed successful aspiration thrombectomy and stenting of an occluded left anterior descending artery.  Troponin rise was only 7.  His sister-in-law is Dr. Shavon Langley, an internist who is now working up in the [facility].    Postinfarct in cardiac rehab, he was noted to have nonsustained runs of ventricular tachycardia and was seen by Dr. Wooten.  Ejection fraction was 50%.  Subsequent Holter monitor demonstrated no significant arrhythmias other than PVCs, which we have treated with beta blockade.    Manan also has glucose intolerance, hypercholesterolemia with HDL " deficiency, obesity, nonalcoholic fatty liver and probably masked hypertension.    Manan has had problems with beta blockers including fatigue, tiredness, cold hands cold feet.  We tried switching to Bystolic.  This resulted in no significant improvement.  He noticed he did have more energy and spark off the beta blockers.  However, his PVCs would increase and be quite bothersome.  Ultimately, we settled on 50 mg of metoprolol and he is satisfied with that.    In 2016, another spell led to another trip to the EP Lab where he was found to be noninducible and a Reveal was implanted. This never demonstrated any ventricular tachycardia.    He had a stress echo done through the [facility] in 12/2019 for a sudden decrease in exercise tolerance.  He exercised 9 minutes.  He states he could have easily gone longer.  There was no evidence of ischemia by echo or EKG, achieving 87% of his target heart rate off of beta blockade.    Elevated liver function tests demonstrated that he had fatty liver.  He changed his diet, exercised and lost 15 pounds of weight.  Unfortunately, COVID has occurred and he has gained all the weight back.  He is not exercising and he states he is not paying as close attention to his diet.    He again was feeling poorly, and in November, we ran a stress MRI.  He could not exercise because of orthopedic problems with his knees.  Stress MRI fortunately demonstrated no ischemia and a tiny area of scar tissue associated with his previous MI.    Daquan returns to clinic stating he thinks he is doing well from a cardiac standpoint.  He states he has been working quite a bit with my TYREL, adjusting medications to get good control of his blood pressure and avoiding side effects.  He states at home his blood pressure is not adequately controlled as he has many blood pressures in the 140s, occasional 160.  After some discussion, we decided we get more aggressive.  He very well may have masked hypertension, where  his blood pressure is good in the office today 115/73.    ASSESSMENT AND PLAN:  Manan appears to be doing well from a cardiac standpoint without evidence of ischemia and this is supported by his 2019 [place] stress echo and our stress MRI from November.    No evidence of heart failure or significant arrhythmias and we will continue his beta blockers despite his beta blocker side effects.    We talked about the most important thing is the fact that he is deconditioned, gained weight and is not following his diet.  I have emphasized the importance of getting back to some sort of regular exercise and this exercise needs to be both aerobic activity and resistance activity to increase his muscle mass.  We talked about sarcopenia, basic metabolic rates and the fact that it affects everything including his blood pressure, cholesterol, cholesterol profile and his glucose intolerance or prediabetes.    Blood pressure is excellent today at 115/73, but given his home blood pressures and possible masked hypertension, I will increase his hydrochlorothiazide from 12.5 mg daily to 25 mg daily and have him return in 3 months.  I will have him follow up with my TYREL with a blood pressure check and we will check a BMP At that time. He also asked that we recheck hemoglobin A1c.      His glucose today was 102.  Review of the [facility] records show a hemoglobin A1c of 5.8.  We talked about prediabetes.  Again, the importance of diet, exercise, weight, muscle mass.  He does have diabetes in his family.  We will check a hemoglobin A1c when he returns in 3 months.    Fasting lipid profile represents a back slide. Our last check was in September, but he had another check at the [facility] in November and this has deteriorated even worse.  His cholesterol has climbed up in the 130s, HDL from [facility] is 44, LDL is now up to 68, triglycerides are 135.  We talked about all of this can be explained by his change in lifestyle.  We talked about  the consequences of prediabetes and full-blown diabetes.    [facility] told him that he is at high risk for AFib and I have told him I agree with that.  He does have an Apple watch for which he is watching.    Over 45 minutes was spent with Daquan today.    Abbe Corley MD, [facility]        D: 2023   T: 2023   MT: [email or web address]    Name:     HENRY MEJIA  MRN:      [id number]        Account:      [id number]   :      1955           Service Date: 2023       Document: [id number]      Thank you for allowing me to participate in the care of your patient.      Sincerely,     Abbe Corley MD     [facility]  cc:   Tristen Padilla PA-C  [address] [place] [address]

## 2023-01-03 NOTE — PROGRESS NOTES
Service Date: 01/03/2023    Mr. Bryant is a very nice 67-year-old gentleman who I originally met in 2010 when he presented with an acute inferior wall myocardial infarction.  We performed successful aspiration thrombectomy and stenting of an occluded left anterior descending artery.  Troponin rise was only 7.  His sister-in-law is Dr. Shavon Langley, an internist who is now working up in the [facility].    Postinfarct in cardiac rehab, he was noted to have nonsustained runs of ventricular tachycardia and was seen by Dr. Wooten.  Ejection fraction was 50%.  Subsequent Holter monitor demonstrated no significant arrhythmias other than PVCs, which we have treated with beta blockade.    Manan also has glucose intolerance, hypercholesterolemia with HDL deficiency, obesity, nonalcoholic fatty liver and probably masked hypertension.    Manan has had problems with beta blockers including fatigue, tiredness, cold hands cold feet.  We tried switching to Bystolic.  This resulted in no significant improvement.  He noticed he did have more energy and spark off the beta blockers.  However, his PVCs would increase and be quite bothersome.  Ultimately, we settled on 50 mg of metoprolol and he is satisfied with that.    In 2016, another spell led to another trip to the EP Lab where he was found to be noninducible and a Reveal was implanted. This never demonstrated any ventricular tachycardia.    He had a stress echo done through the [facility] in 12/2019 for a sudden decrease in exercise tolerance.  He exercised 9 minutes.  He states he could have easily gone longer.  There was no evidence of ischemia by echo or EKG, achieving 87% of his target heart rate off of beta blockade.    Elevated liver function tests demonstrated that he had fatty liver.  He changed his diet, exercised and lost 15 pounds of weight.  Unfortunately, COVID has occurred and he has gained all the weight back.  He is not exercising and he states he is not  paying as close attention to his diet.    He again was feeling poorly, and in November, we ran a stress MRI.  He could not exercise because of orthopedic problems with his knees.  Stress MRI fortunately demonstrated no ischemia and a tiny area of scar tissue associated with his previous MI.    Dqauan returns to clinic stating he thinks he is doing well from a cardiac standpoint.  He states he has been working quite a bit with my TYREL, adjusting medications to get good control of his blood pressure and avoiding side effects.  He states at home his blood pressure is not adequately controlled as he has many blood pressures in the 140s, occasional 160.  After some discussion, we decided we get more aggressive.  He very well may have masked hypertension, where his blood pressure is good in the office today 115/73.    ASSESSMENT AND PLAN:  Manan appears to be doing well from a cardiac standpoint without evidence of ischemia and this is supported by his 2019 [place] stress echo and our stress MRI from November.    No evidence of heart failure or significant arrhythmias and we will continue his beta blockers despite his beta blocker side effects.    We talked about the most important thing is the fact that he is deconditioned, gained weight and is not following his diet.  I have emphasized the importance of getting back to some sort of regular exercise and this exercise needs to be both aerobic activity and resistance activity to increase his muscle mass.  We talked about sarcopenia, basic metabolic rates and the fact that it affects everything including his blood pressure, cholesterol, cholesterol profile and his glucose intolerance or prediabetes.    Blood pressure is excellent today at 115/73, but given his home blood pressures and possible masked hypertension, I will increase his hydrochlorothiazide from 12.5 mg daily to 25 mg daily and have him return in 3 months.  I will have him follow up with my TYREL with a blood pressure  check and we will check a BMP At that time. He also asked that we recheck hemoglobin A1c.      His glucose today was 102.  Review of the [facility] records show a hemoglobin A1c of 5.8.  We talked about prediabetes.  Again, the importance of diet, exercise, weight, muscle mass.  He does have diabetes in his family.  We will check a hemoglobin A1c when he returns in 3 months.    Fasting lipid profile represents a back slide. Our last check was in September, but he had another check at the [facility] in November and this has deteriorated even worse.  His cholesterol has climbed up in the 130s, HDL from [facility] is 44, LDL is now up to 68, triglycerides are 135.  We talked about all of this can be explained by his change in lifestyle.  We talked about the consequences of prediabetes and full-blown diabetes.    [facility] told him that he is at high risk for AFib and I have told him I agree with that.  He does have an Apple watch for which he is watching.    Over 45 minutes was spent with Daquan today.    Abbe Corley MD, [facility]        D: 2023   T: 2023   MT: [email or web address]    Name:     HENRY MEJIALesly  MRN:      [id number]        Account:      [id number]   :      1955           Service Date: 2023       Document: [id number]

## 2023-01-03 NOTE — PROGRESS NOTES
HPI and Plan:   See dictation    Today's clinic visit entailed:  Review of external notes as documented elsewhere in note  Review of the result(s) of each unique test - MRI, lab work, Burkett stress echo  Ordering of each unique test  Prescription drug management  45 minutes spent on the date of the encounter doing chart review, history and exam, documentation and further activities per the note  Provider  Link to MDM Help Grid     The level of medical decision making during this visit was of moderate complexity.      Orders Placed This Encounter   Procedures     Basic metabolic panel     Hemoglobin A1c     Follow-Up with Cardiology TYREL     Follow-Up with Cardiologist       Orders Placed This Encounter   Medications     hydrochlorothiazide (HYDRODIURIL) 25 MG tablet     Sig: Take 1 tablet (25 mg) by mouth daily     Dispense:  90 tablet     Refill:  4       Medications Discontinued During This Encounter   Medication Reason     albuterol (PROAIR HFA/PROVENTIL HFA/VENTOLIN HFA) 108 (90 Base) MCG/ACT inhaler Stopped by Patient     hydrochlorothiazide (HYDRODIURIL) 12.5 MG tablet          Encounter Diagnoses   Name Primary?     Coronary artery disease involving native coronary artery of native heart without angina pectoris      Benign essential hypertension      Hyperlipidemia LDL goal <70      Glucose intolerance (impaired glucose tolerance) Yes     HDL deficiency      PVC (premature ventricular contraction)      NSVT (nonsustained ventricular tachycardia)      Class 1 obesity due to excess calories without serious comorbidity with body mass index (BMI) of 30.0 to 30.9 in adult        CURRENT MEDICATIONS:  Current Outpatient Medications   Medication Sig Dispense Refill     ASPIRIN PO Take 81 mg by mouth daily       Cholecalciferol (VITAMIN D PO) Take 1,000 mg by mouth daily        hydrochlorothiazide (HYDRODIURIL) 25 MG tablet Take 1 tablet (25 mg) by mouth daily 90 tablet 4     losartan (COZAAR) 100 MG tablet Take 1  tablet (100 mg) by mouth daily 90 tablet 1     metoprolol succinate ER (TOPROL XL) 50 MG 24 hr tablet Take 1 tablet (50 mg) by mouth daily 90 tablet 1     Multiple Vitamin (MULTI-VITAMIN PO) Take by mouth daily        omeprazole (PRILOSEC) 20 MG DR capsule Take 1 capsule (20 mg) by mouth daily 90 capsule 3     rosuvastatin (CRESTOR) 40 MG tablet Take 1 tablet (40 mg) by mouth daily 90 tablet 1     nitroGLYcerin (NITROSTAT) 0.4 MG sublingual tablet Place 1 tablet (0.4 mg) under the tongue every 5 minutes as needed for chest pain (Patient not taking: Reported on 1/3/2023) 25 tablet 3       ALLERGIES   No Known Allergies    PAST MEDICAL HISTORY:  Past Medical History:   Diagnosis Date     CAD (coronary artery disease)     cardiac cath 8/5/2010: ELLIOTT to LAD     Essential hypertension, benign      Family history of early CAD      History of acute anterior wall MI 2010 2010     History of colonic polyps      Mixed hyperlipidemia      Myocardial infarction (H)      Paroxysmal supraventricular tachycardia (H)      PVC (premature ventricular contraction)     Hx ventricular tachycardia during cardiac rehab 2010     SVT (supraventricular tachycardia) (H)      Syncope     episode 2016 - EP study - loop recorder implanted 2016 - non-functional after 3 years       PAST SURGICAL HISTORY:  Past Surgical History:   Procedure Laterality Date     COLONOSCOPY       STENT, CORONARY, EMEKA  8/2010    LAD       FAMILY HISTORY:  Family History   Problem Relation Age of Onset     Coronary Artery Disease Father      Myocardial Infarction Father      Coronary Artery Disease Brother      Myocardial Infarction Brother      Heart Surgery Brother         bypass     Coronary Artery Disease Paternal Grandfather      Myocardial Infarction Paternal Grandfather      Sleep Apnea Sister      Family History Negative Mother      Family History Negative Maternal Grandmother      Heart Failure Maternal Grandfather      Family History Negative Paternal  "Grandmother      Myocardial Infarction Brother      Coronary Artery Disease Brother      Heart Surgery Brother         bypass     Coronary Artery Disease Brother      Obesity Sister      Obesity Brother      Obesity Brother        SOCIAL HISTORY:  Social History     Socioeconomic History     Marital status:      Spouse name: None     Number of children: None     Years of education: None     Highest education level: None   Tobacco Use     Smoking status: Never     Smokeless tobacco: Never   Substance and Sexual Activity     Alcohol use: No     Alcohol/week: 0.0 standard drinks     Drug use: No     Sexual activity: Not Currently     Partners: Female     Birth control/protection: Male Surgical   Other Topics Concern     Parent/sibling w/ CABG, MI or angioplasty before 65F 55M? Yes     Comment: Both brothers - one in his early 40's.  Both have repeated.     Caffeine Concern No     Comment: decaf: 1-2 cups a day. Diek coke: 4-5 cans a day     Sleep Concern No     Stress Concern No     Weight Concern No     Special Diet Yes     Comment: limited, heart healthy     Exercise Yes     Comment: bike outside, 1-3x a week     Bike Helmet Yes     Seat Belt Yes   Social History Narrative    ,    Retired - continues        Review of Systems:  Skin:  Negative       Eyes:  Positive for glasses    ENT:  Negative      Respiratory:  Negative       Cardiovascular:  Negative      Gastroenterology: Positive for reflux    Genitourinary:  Negative      Musculoskeletal:  Positive for arthritis    Neurologic:  Negative      Psychiatric:  Negative      Heme/Lymph/Imm:  Negative      Endocrine:  Positive for   pre-diabetic    Physical Exam:  Vitals: /73 (BP Location: Left arm, Cuff Size: Adult Large)   Pulse 51   Ht 1.816 m (5' 11.5\")   Wt 101.2 kg (223 lb)   BMI 30.67 kg/m      Constitutional:  cooperative, alert and oriented, well developed, well nourished, in no acute distress obese      Skin:  warm " and dry to the touch, no apparent skin lesions or masses noted          Head:  normocephalic, no masses or lesions        Eyes:           Lymph:      ENT:  no pallor or cyanosis, dentition good        Neck:  carotid pulses are full and equal bilaterally;no carotid bruit        Respiratory:  normal breath sounds, clear to auscultation, normal A-P diameter, normal symmetry, normal respiratory excursion, no use of accessory muscles         Cardiac: regular rhythm;normal S1 and S2;no S3 or S4;no murmurs, gallops or rubs detected                pulses full and equal                                        GI:  not assessed this visit        Extremities and Muscular Skeletal:  no edema;no spinal abnormalities noted;normal muscle strength and tone              Neurological:  no gross motor deficits        Psych:  affect appropriate, oriented to time, person and place        MEMO Padilla PA-C  [address] [place] [address]

## 2023-02-03 DIAGNOSIS — I10 BENIGN ESSENTIAL HYPERTENSION: ICD-10-CM

## 2023-02-03 DIAGNOSIS — E78.5 HYPERLIPIDEMIA LDL GOAL <70: Chronic | ICD-10-CM

## 2023-02-03 RX ORDER — LOSARTAN POTASSIUM 100 MG/1
100 TABLET ORAL DAILY
Qty: 90 TABLET | Refills: 4 | Status: SHIPPED | OUTPATIENT
Start: 2023-02-03 | End: 2024-02-10

## 2023-02-03 RX ORDER — ROSUVASTATIN CALCIUM 40 MG/1
40 TABLET, COATED ORAL DAILY
Qty: 90 TABLET | Refills: 4 | Status: SHIPPED | OUTPATIENT
Start: 2023-02-03 | End: 2024-02-10

## 2023-02-03 NOTE — TELEPHONE ENCOUNTER
[facility] Cardiology Refill Guideline reviewed.  Medications meet criteria for refill.

## 2023-02-21 DIAGNOSIS — I25.10 CORONARY ARTERY DISEASE INVOLVING NATIVE CORONARY ARTERY OF NATIVE HEART WITHOUT ANGINA PECTORIS: Chronic | ICD-10-CM

## 2023-02-21 DIAGNOSIS — E78.5 HYPERLIPIDEMIA LDL GOAL <70: Chronic | ICD-10-CM

## 2023-02-21 RX ORDER — METOPROLOL SUCCINATE 50 MG/1
50 TABLET, EXTENDED RELEASE ORAL DAILY
Qty: 90 TABLET | Refills: 4 | Status: SHIPPED | OUTPATIENT
Start: 2023-02-21 | End: 2024-02-03

## 2023-02-21 NOTE — TELEPHONE ENCOUNTER
Received refill request for:  Metoprolol    [facility] Cardiology Refill Guideline reviewed.  Medication meets criteria for refill.    Jacqui Mcmillan RN, BSN  02/21/23 at 3:23 PM

## 2023-04-07 ENCOUNTER — LAB (OUTPATIENT)
Dept: LAB | Facility: CLINIC | Age: 68
End: 2023-04-07
Payer: MEDICARE

## 2023-04-07 DIAGNOSIS — Z11.4 SCREENING FOR HIV (HUMAN IMMUNODEFICIENCY VIRUS): ICD-10-CM

## 2023-04-07 DIAGNOSIS — R73.02 GLUCOSE INTOLERANCE (IMPAIRED GLUCOSE TOLERANCE): ICD-10-CM

## 2023-04-07 DIAGNOSIS — I10 BENIGN ESSENTIAL HYPERTENSION: ICD-10-CM

## 2023-04-07 LAB
ALBUMIN SERPL BCG-MCNC: 4.6 G/DL (ref 3.5–5.2)
ALP SERPL-CCNC: 109 U/L (ref 40–129)
ALT SERPL W P-5'-P-CCNC: 29 U/L (ref 10–50)
ANION GAP SERPL CALCULATED.3IONS-SCNC: 8 MMOL/L (ref 7–15)
AST SERPL W P-5'-P-CCNC: 38 U/L (ref 10–50)
BILIRUB SERPL-MCNC: 0.6 MG/DL
BUN SERPL-MCNC: 14.2 MG/DL (ref 8–23)
CALCIUM SERPL-MCNC: 9.9 MG/DL (ref 8.8–10.2)
CHLORIDE SERPL-SCNC: 100 MMOL/L (ref 98–107)
CREAT SERPL-MCNC: 1.3 MG/DL (ref 0.67–1.17)
DEPRECATED HCO3 PLAS-SCNC: 28 MMOL/L (ref 22–29)
ERYTHROCYTE [DISTWIDTH] IN BLOOD BY AUTOMATED COUNT: 12.6 % (ref 10–15)
GFR SERPL CREATININE-BSD FRML MDRD: 61 ML/MIN/1.73M2
GLUCOSE SERPL-MCNC: 114 MG/DL (ref 70–99)
HBA1C MFR BLD: 6.1 % (ref 0–5.6)
HCT VFR BLD AUTO: 44.8 % (ref 40–53)
HGB BLD-MCNC: 15.5 G/DL (ref 13.3–17.7)
HIV 1+2 AB+HIV1 P24 AG SERPL QL IA: NONREACTIVE
MCH RBC QN AUTO: 30.2 PG (ref 26.5–33)
MCHC RBC AUTO-ENTMCNC: 34.6 G/DL (ref 31.5–36.5)
MCV RBC AUTO: 87 FL (ref 78–100)
PLATELET # BLD AUTO: 217 10E3/UL (ref 150–450)
POTASSIUM SERPL-SCNC: 4.3 MMOL/L (ref 3.4–5.3)
PROT SERPL-MCNC: 7.1 G/DL (ref 6.4–8.3)
RBC # BLD AUTO: 5.13 10E6/UL (ref 4.4–5.9)
SODIUM SERPL-SCNC: 136 MMOL/L (ref 136–145)
WBC # BLD AUTO: 6.5 10E3/UL (ref 4–11)

## 2023-04-07 PROCEDURE — 36415 COLL VENOUS BLD VENIPUNCTURE: CPT

## 2023-04-07 PROCEDURE — 85027 COMPLETE CBC AUTOMATED: CPT

## 2023-04-07 PROCEDURE — 80053 COMPREHEN METABOLIC PANEL: CPT

## 2023-04-07 PROCEDURE — 83036 HEMOGLOBIN GLYCOSYLATED A1C: CPT

## 2023-04-07 PROCEDURE — 87389 HIV-1 AG W/HIV-1&-2 AB AG IA: CPT

## 2023-04-07 NOTE — RESULT ENCOUNTER NOTE
"      Dear Daquan,     I'm helping with Dr. Villela's coverage while he is out of the clinic:     Here are your recent comprehensive metabolic panel results which show normal electrolytes and liver tests.  Your creatinine which is a measure of kidney function is slightly elevated.  Your should avoid \"NSAID\" medications like ibuprofen, aleve, naproxen, etc and get plenty of fluids.     Your complete blood count was normal.    Dr. Villela will see the results when he returns to the clinic.     Please let us know if you have any questions or concerns.    Regards,  Jesica Christina PA-C "

## 2023-04-19 ENCOUNTER — MYC MEDICAL ADVICE (OUTPATIENT)
Dept: CARDIOLOGY | Facility: CLINIC | Age: 68
End: 2023-04-19
Payer: MEDICARE

## 2023-04-20 NOTE — TELEPHONE ENCOUNTER
My Chart message sent to Patient -   Good Morning Mr. Bryant,     Dr. Corley reviewed your My Chart message.  Dr. Corley's reply - Yes okay to cancel.  Nephrology will check your BMP and adjust blood pressure medicines and diuretics as necessary.   Appointment with our TYREL would be redundant .     Please keep Nephrology appointment.    Your last Dr. Corley OV was 1-3-23. Next annual OV would be in Jan 2024 but please call or message any concerns or questions before.     Thank you,   Dr. Corley's Nurse Team 2.

## 2023-04-24 ENCOUNTER — TELEPHONE (OUTPATIENT)
Dept: FAMILY MEDICINE | Facility: CLINIC | Age: 68
End: 2023-04-24
Payer: MEDICARE

## 2023-07-23 ENCOUNTER — TELEPHONE (OUTPATIENT)
Dept: NURSING | Facility: CLINIC | Age: 68
End: 2023-07-23
Payer: MEDICARE

## 2023-07-23 NOTE — TELEPHONE ENCOUNTER
General Call    Contacts       Type Contact Phone/Fax    07/23/2023 09:12 AM CDT Phone (Incoming) AdrianoDaquan conklin JOLEEN (Self) [phone] (M)        Reason for Call:  Rabies booster vaccine    What are your questions or concerns:  Pt would like to know if  has rabies booster vaccine in stock.    Date of last appointment with provider: n/a    Could we send this information to you in [facility] or would you prefer to receive a phone call?:   Patient would prefer a phone call   Okay to leave a detailed message?: Yes at Cell number on file:    Telephone Information:   Mobile [phone]

## 2023-07-24 NOTE — TELEPHONE ENCOUNTER
Called and spoke with patient. He advised his concern has been addressed. Saw no documentation at this time. Documented in chart and closed encounter. Loreto Ahn MA

## 2023-11-05 ENCOUNTER — HEALTH MAINTENANCE LETTER (OUTPATIENT)
Age: 68
End: 2023-11-05

## 2023-11-10 DIAGNOSIS — K21.9 GASTROESOPHAGEAL REFLUX DISEASE WITHOUT ESOPHAGITIS: ICD-10-CM

## 2023-12-25 ENCOUNTER — APPOINTMENT (OUTPATIENT)
Dept: CT IMAGING | Facility: CLINIC | Age: 68
DRG: 321 | End: 2023-12-25
Attending: EMERGENCY MEDICINE
Payer: MEDICARE

## 2023-12-25 ENCOUNTER — APPOINTMENT (OUTPATIENT)
Dept: ULTRASOUND IMAGING | Facility: CLINIC | Age: 68
DRG: 321 | End: 2023-12-25
Attending: EMERGENCY MEDICINE
Payer: MEDICARE

## 2023-12-25 ENCOUNTER — HOSPITAL ENCOUNTER (EMERGENCY)
Facility: CLINIC | Age: 68
Discharge: HOME | DRG: 321 | End: 2023-12-25
Attending: EMERGENCY MEDICINE | Admitting: EMERGENCY MEDICINE
Payer: MEDICARE

## 2023-12-25 ENCOUNTER — APPOINTMENT (OUTPATIENT)
Dept: GENERAL RADIOLOGY | Facility: CLINIC | Age: 68
DRG: 321 | End: 2023-12-25
Attending: EMERGENCY MEDICINE
Payer: MEDICARE

## 2023-12-25 VITALS
TEMPERATURE: 98.9 F | DIASTOLIC BLOOD PRESSURE: 95 MMHG | HEART RATE: 77 BPM | RESPIRATION RATE: 17 BRPM | OXYGEN SATURATION: 95 % | SYSTOLIC BLOOD PRESSURE: 146 MMHG

## 2023-12-25 DIAGNOSIS — R79.89 ELEVATED LIVER FUNCTION TESTS: ICD-10-CM

## 2023-12-25 DIAGNOSIS — K85.90 ACUTE PANCREATITIS, UNSPECIFIED COMPLICATION STATUS, UNSPECIFIED PANCREATITIS TYPE: ICD-10-CM

## 2023-12-25 LAB
ACANTHOCYTES BLD QL SMEAR: NORMAL
ALBUMIN SERPL BCG-MCNC: 4.2 G/DL (ref 3.5–5.2)
ALP SERPL-CCNC: 110 U/L (ref 40–150)
ALT SERPL W P-5'-P-CCNC: 17 U/L (ref 0–70)
ANION GAP SERPL CALCULATED.3IONS-SCNC: 14 MMOL/L (ref 7–15)
AST SERPL W P-5'-P-CCNC: 26 U/L (ref 0–45)
ATRIAL RATE - MUSE: 83 BPM
AUER BODIES BLD QL SMEAR: NORMAL
BASO STIPL BLD QL SMEAR: NORMAL
BASOPHILS # BLD AUTO: 0 10E3/UL (ref 0–0.2)
BASOPHILS NFR BLD AUTO: 0 %
BILIRUB DIRECT SERPL-MCNC: 0.23 MG/DL (ref 0–0.3)
BILIRUB SERPL-MCNC: 1.8 MG/DL
BITE CELLS BLD QL SMEAR: NORMAL
BLISTER CELLS BLD QL SMEAR: NORMAL
BUN SERPL-MCNC: 21.2 MG/DL (ref 8–23)
BURR CELLS BLD QL SMEAR: NORMAL
CALCIUM SERPL-MCNC: 9.5 MG/DL (ref 8.8–10.2)
CHLORIDE SERPL-SCNC: 98 MMOL/L (ref 98–107)
CREAT SERPL-MCNC: 1.27 MG/DL (ref 0.67–1.17)
D DIMER PPP FEU-MCNC: 1.12 UG/ML FEU (ref 0–0.5)
DACRYOCYTES BLD QL SMEAR: NORMAL
DEPRECATED HCO3 PLAS-SCNC: 23 MMOL/L (ref 22–29)
DIASTOLIC BLOOD PRESSURE - MUSE: NORMAL MMHG
EGFRCR SERPLBLD CKD-EPI 2021: 62 ML/MIN/1.73M2
ELLIPTOCYTES BLD QL SMEAR: NORMAL
EOSINOPHIL # BLD AUTO: 0 10E3/UL (ref 0–0.7)
EOSINOPHIL NFR BLD AUTO: 0 %
ERYTHROCYTE [DISTWIDTH] IN BLOOD BY AUTOMATED COUNT: 12.8 % (ref 10–15)
FLUAV RNA SPEC QL NAA+PROBE: NEGATIVE
FLUBV RNA RESP QL NAA+PROBE: NEGATIVE
FRAGMENTS BLD QL SMEAR: NORMAL
GLUCOSE SERPL-MCNC: 121 MG/DL (ref 70–99)
HCT VFR BLD AUTO: 48.8 % (ref 40–53)
HGB BLD-MCNC: 16.9 G/DL (ref 13.3–17.7)
HGB C CRYSTALS: NORMAL
HOLD SPECIMEN: NORMAL
HOWELL-JOLLY BOD BLD QL SMEAR: NORMAL
IMM GRANULOCYTES # BLD: 0.1 10E3/UL
IMM GRANULOCYTES NFR BLD: 0 %
INTERPRETATION ECG - MUSE: NORMAL
LIPASE SERPL-CCNC: 127 U/L (ref 13–60)
LYMPHOCYTES # BLD AUTO: 1.6 10E3/UL (ref 0.8–5.3)
LYMPHOCYTES NFR BLD AUTO: 12 %
MCH RBC QN AUTO: 30.6 PG (ref 26.5–33)
MCHC RBC AUTO-ENTMCNC: 34.6 G/DL (ref 31.5–36.5)
MCV RBC AUTO: 88 FL (ref 78–100)
MONOCYTES # BLD AUTO: 1.5 10E3/UL (ref 0–1.3)
MONOCYTES NFR BLD AUTO: 11 %
NEUTROPHILS # BLD AUTO: 10.4 10E3/UL (ref 1.6–8.3)
NEUTROPHILS NFR BLD AUTO: 77 %
NEUTS HYPERSEG BLD QL SMEAR: NORMAL
NRBC # BLD AUTO: 0 10E3/UL
NRBC BLD AUTO-RTO: 0 /100
P AXIS - MUSE: 32 DEGREES
PLAT MORPH BLD: NORMAL
PLATELET # BLD AUTO: 246 10E3/UL (ref 150–450)
POLYCHROMASIA BLD QL SMEAR: NORMAL
POTASSIUM SERPL-SCNC: 3.8 MMOL/L (ref 3.4–5.3)
PR INTERVAL - MUSE: 142 MS
PROT SERPL-MCNC: 7.2 G/DL (ref 6.4–8.3)
QRS DURATION - MUSE: 78 MS
QT - MUSE: 352 MS
QTC - MUSE: 413 MS
R AXIS - MUSE: -4 DEGREES
RBC # BLD AUTO: 5.52 10E6/UL (ref 4.4–5.9)
RBC AGGLUT BLD QL: NORMAL
RBC MORPH BLD: NORMAL
ROULEAUX BLD QL SMEAR: NORMAL
RSV RNA SPEC NAA+PROBE: NEGATIVE
SARS-COV-2 RNA RESP QL NAA+PROBE: NEGATIVE
SICKLE CELLS BLD QL SMEAR: NORMAL
SMUDGE CELLS BLD QL SMEAR: NORMAL
SODIUM SERPL-SCNC: 135 MMOL/L (ref 135–145)
SPHEROCYTES BLD QL SMEAR: NORMAL
STOMATOCYTES BLD QL SMEAR: NORMAL
SYSTOLIC BLOOD PRESSURE - MUSE: NORMAL MMHG
T AXIS - MUSE: 12 DEGREES
TARGETS BLD QL SMEAR: NORMAL
TOXIC GRANULES BLD QL SMEAR: NORMAL
TROPONIN T SERPL HS-MCNC: 11 NG/L
TROPONIN T SERPL HS-MCNC: 13 NG/L
VARIANT LYMPHS BLD QL SMEAR: NORMAL
VENTRICULAR RATE- MUSE: 83 BPM
WBC # BLD AUTO: 13.6 10E3/UL (ref 4–11)

## 2023-12-25 PROCEDURE — 76705 ECHO EXAM OF ABDOMEN: CPT

## 2023-12-25 PROCEDURE — 258N000003 HC RX IP 258 OP 636: Performed by: EMERGENCY MEDICINE

## 2023-12-25 PROCEDURE — 71275 CT ANGIOGRAPHY CHEST: CPT | Mod: ME

## 2023-12-25 PROCEDURE — 83690 ASSAY OF LIPASE: CPT | Performed by: EMERGENCY MEDICINE

## 2023-12-25 PROCEDURE — 80048 BASIC METABOLIC PNL TOTAL CA: CPT | Performed by: EMERGENCY MEDICINE

## 2023-12-25 PROCEDURE — 87637 SARSCOV2&INF A&B&RSV AMP PRB: CPT | Performed by: EMERGENCY MEDICINE

## 2023-12-25 PROCEDURE — 82248 BILIRUBIN DIRECT: CPT | Performed by: EMERGENCY MEDICINE

## 2023-12-25 PROCEDURE — 71046 X-RAY EXAM CHEST 2 VIEWS: CPT

## 2023-12-25 PROCEDURE — 84484 ASSAY OF TROPONIN QUANT: CPT | Performed by: EMERGENCY MEDICINE

## 2023-12-25 PROCEDURE — 250N000009 HC RX 250: Performed by: EMERGENCY MEDICINE

## 2023-12-25 PROCEDURE — 36415 COLL VENOUS BLD VENIPUNCTURE: CPT | Performed by: EMERGENCY MEDICINE

## 2023-12-25 PROCEDURE — 99285 EMERGENCY DEPT VISIT HI MDM: CPT | Mod: 25

## 2023-12-25 PROCEDURE — 250N000011 HC RX IP 250 OP 636: Performed by: EMERGENCY MEDICINE

## 2023-12-25 PROCEDURE — 85379 FIBRIN DEGRADATION QUANT: CPT | Performed by: EMERGENCY MEDICINE

## 2023-12-25 PROCEDURE — 96360 HYDRATION IV INFUSION INIT: CPT | Mod: 59

## 2023-12-25 PROCEDURE — 85025 COMPLETE CBC W/AUTO DIFF WBC: CPT | Performed by: EMERGENCY MEDICINE

## 2023-12-25 PROCEDURE — 93005 ELECTROCARDIOGRAM TRACING: CPT

## 2023-12-25 RX ORDER — IOPAMIDOL 755 MG/ML
78 INJECTION, SOLUTION INTRAVASCULAR ONCE
Status: COMPLETED | OUTPATIENT
Start: 2023-12-25 | End: 2023-12-25

## 2023-12-25 RX ADMIN — SODIUM CHLORIDE 1000 ML: 9 INJECTION, SOLUTION INTRAVENOUS at 19:33

## 2023-12-25 RX ADMIN — IOPAMIDOL 78 ML: 755 INJECTION, SOLUTION INTRAVENOUS at 18:49

## 2023-12-25 RX ADMIN — SODIUM CHLORIDE 100 ML: 9 INJECTION, SOLUTION INTRAVENOUS at 18:50

## 2023-12-25 ASSESSMENT — ACTIVITIES OF DAILY LIVING (ADL)
ADLS_ACUITY_SCORE: 35

## 2023-12-25 NOTE — ED NOTES
Tele-PIT/Intake Evaluation      Video-Visit Details    Type of service:  Video Visit    Video Start Time (time video started): 2:12 PM  Video End Time (time video stopped): 2:16 PM   Originating Location (pt. Location): [facility]  Distant Location (provider location):  [facility]  Mode of Communication:  Video Conference via [employer]  Patient verbally consented to [facility] televisit.    History:    68-year-old male presents with left-sided chest pain.  Symptoms began 2 days prior to arrival.  Pain is left-sided and aggravated by deep inspiration.  Chest pain has slowly improved but now has developed a fever up to 101.4.  He has a mild associated cough and chills.  He is experiencing associated shortness of breath and nausea.  He has a history of coronary artery disease.  He denies sick contacts and reports that he is quite cautious in regards to COVID precautions.  No history of DVT, PE, unilateral leg swelling, recent immobilization or hemoptysis.    Exam:  Patient Vitals for the past 24 hrs:   BP Temp Pulse Resp SpO2   12/25/23 1418 139/77 98.9  F (37.2  C) 91 14 95 %       Appropriate interventions for symptom management were initiated if applicable.  Appropriate diagnostic tests were initiated if indicated.    Important information for subsequent clinician:  EKG, cardiac enzymes ordered.  Due to infectious symptoms with fever, chest x-ray and viral testing ordered as well.    I briefly evaluated the patient and developed an initial plan of care. I discussed this plan and explained that this brief interaction does not constitute a full evaluation. Patient/family understands that they should wait to be fully evaluated and discuss any test results with another clinician prior to leaving the hospital.       Abraham Carlos MD  12/25/23 1412       Abraham Carlos MD  12/25/23 0012

## 2023-12-25 NOTE — ED TRIAGE NOTES
Triage Assessment (Adult)       Row Name 12/25/23 1418          Triage Assessment    Airway WDL WDL        Respiratory WDL    Respiratory WDL WDL        Skin Circulation/Temperature WDL    Skin Circulation/Temperature WDL WDL        Cardiac WDL    Cardiac WDL X  chest pain        Peripheral/Neurovascular WDL    Peripheral Neurovascular WDL WDL        Cognitive/Neuro/Behavioral WDL    Cognitive/Neuro/Behavioral WDL WDL

## 2023-12-25 NOTE — ED TRIAGE NOTES
Having pain across abdomen, left side and left sided chest pain. This began shortly after carrying tile. Patient reports difficulty taking a deep breath.

## 2023-12-26 NOTE — ED PROVIDER NOTES
History     Chief Complaint:  Chest Pain       HPI   Manan Bryant is a 68 year old male who presents with left lower chest pain that been present for the last 2 to 3 days.  He states that it feels identical to his previous heart attack and this is what concerned him.  He also states that he also has a fever to 101.4, and some cold and flu symptoms as well.  States he is able to do all of his activities of daily living, is concerned about having an MI, but is reassured as he has already checked his MyChart while waiting the waiting room and notes his workup was negative here.  No trauma, no nausea or vomiting, no abdominal pain, does feel comfortable going home if his workup is negative.      Independent Historian:   No    Review of External Notes: Yes, I reviewed the patient's last office visit where he was seen by his nephrologist on 8 December 2023 for chronic kidney disease stage II      Allergies:  No Known Allergies     Medications:    ASPIRIN PO  Cholecalciferol (VITAMIN D PO)  hydrochlorothiazide (HYDRODIURIL) 25 MG tablet  losartan (COZAAR) 100 MG tablet  metoprolol succinate ER (TOPROL XL) 50 MG 24 hr tablet  Multiple Vitamin (MULTI-VITAMIN PO)  nitroGLYcerin (NITROSTAT) 0.4 MG sublingual tablet  omeprazole (PRILOSEC) 20 MG DR capsule  rosuvastatin (CRESTOR) 40 MG tablet        Past Medical History:    Past Medical History:   Diagnosis Date    CAD (coronary artery disease)     Essential hypertension, benign     Family history of early CAD     History of acute anterior wall MI 2010    History of colonic polyps     Mixed hyperlipidemia     Myocardial infarction (H)     Paroxysmal supraventricular tachycardia (H)     PVC (premature ventricular contraction)     SVT (supraventricular tachycardia) (H)     Syncope        Past Surgical History:    Past Surgical History:   Procedure Laterality Date    COLONOSCOPY      STENT, CORONARY, EMEKA  8/2010    LAD        Family History:    family history includes Coronary  Artery Disease in his brother, brother, brother, father, and paternal grandfather; Family History Negative in his maternal grandmother, mother, and paternal grandmother; Heart Failure in his maternal grandfather; Heart Surgery in his brother and brother; Myocardial Infarction in his brother, brother, father, and paternal grandfather; Obesity in his brother, brother, and sister; Sleep Apnea in his sister.    Social History:   reports that he has never smoked. He has never used smokeless tobacco. He reports that he does not drink alcohol and does not use drugs.  PCP: Ric Villela     Physical Exam   Patient Vitals for the past 24 hrs:   BP Temp Pulse Resp SpO2   12/25/23 1418 139/77 98.9  F (37.2  C) 91 14 95 %        Physical Exam  Vitals: reviewed by me  General: Pt seen on [facility], pleasant, cooperative, and alert to conversation  Eyes: Tracking well, clear conjunctiva BL  ENT: MMM, midline trachea.   Lungs: No tachypnea, no accessory muscle use. No respiratory distress.   CV: Rate as above  Abd: Soft, non tender, no guarding, no rebound. Non distended  MSK: no joint effusion.  No evidence of trauma  Skin: No rash  Neuro: Clear speech and no facial droop.  Psych: Not RIS, no e/o AH/VH          Emergency Department Course     ECG results from 12/25/23   EKG 12-lead, tracing only     Value    Systolic Blood Pressure     Diastolic Blood Pressure     Ventricular Rate 83    Atrial Rate 83    ID Interval 142    QRS Duration 78        QTc 413    P Axis 32    R AXIS -4    T Axis 12    Interpretation ECG      Sinus rhythm  Normal ECG  When compared with ECG of 02-NOV-2022 15:41,  T wave inversion now evident in Inferior leads  Confirmed by GENERATED REPORT, COMPUTER ([id number]),  Kaveh Cortes ([id number]) on 12/25/2023 2:18:59 PM           Imaging:  CT Chest Pulmonary Embolism w Contrast   Final Result   IMPRESSION:   1.  No acute pulmonary embolism.   2.  Tail pancreatitis.            Chest XR,  PA & LAT    Final Result   IMPRESSION: Heart is normal in size. Cardiac recording device. Lungs are clear.         Report per radiology    Laboratory:  Labs Ordered and Resulted from Time of ED Arrival to Time of ED Departure   BASIC METABOLIC PANEL - Abnormal       Result Value    Sodium 135      Potassium 3.8      Chloride 98      Carbon Dioxide (CO2) 23      Anion Gap 14      Urea Nitrogen 21.2      Creatinine 1.27 (*)     GFR Estimate 62      Calcium 9.5      Glucose 121 (*)    CBC WITH PLATELETS AND DIFFERENTIAL - Abnormal    WBC Count 13.6 (*)     RBC Count 5.52      Hemoglobin 16.9      Hematocrit 48.8      MCV 88      MCH 30.6      MCHC 34.6      RDW 12.8      Platelet Count 246      % Neutrophils 77      % Lymphocytes 12      % Monocytes 11      % Eosinophils 0      % Basophils 0      % Immature Granulocytes 0      NRBCs per 100 WBC 0      Absolute Neutrophils 10.4 (*)     Absolute Lymphocytes 1.6      Absolute Monocytes 1.5 (*)     Absolute Eosinophils 0.0      Absolute Basophils 0.0      Absolute Immature Granulocytes 0.1      Absolute NRBCs 0.0     D DIMER QUANTITATIVE - Abnormal    D-Dimer Quantitative 1.12 (*)    LIPASE - Abnormal    Lipase 127 (*)    TROPONIN T, HIGH SENSITIVITY - Normal    Troponin T, High Sensitivity 11     INFLUENZA A/B, RSV, & SARS-COV2 PCR - Normal    Influenza A PCR Negative      Influenza B PCR Negative      RSV PCR Negative      SARS CoV2 PCR Negative     TROPONIN T, HIGH SENSITIVITY - Normal    Troponin T, High Sensitivity 13     RBC AND PLATELET MORPHOLOGY    Platelet Assessment        Value: Automated Count Confirmed. Platelet morphology is normal.    Acanthocytes        Ping Rods        Basophilic Stippling        Bite Cells        Blister Cells        Maize Cells        Elliptocytes        Hgb C Crystals        Aparicio-Jolly Bodies        Hypersegmented Neutrophils        Polychromasia        RBC agglutination        RBC Fragments        Reactive Lymphocytes        Rouleaux         Sickle Cells        Smudge Cells        Spherocytes        Stomatocytes        Target Cells        Teardrop Cells        Toxic Neutrophils        RBC Morphology Confirmed RBC Indices              Emergency Department Course & Assessments:             Interventions:  Medications   sodium chloride 0.9% BOLUS 1,000 mL (has no administration in time range)   sodium chloride 0.9% BOLUS 1,000 mL (1,000 mLs Intravenous $New Bag 12/25/23 1933)   Saline (100 mLs As instructed $Given 12/25/23 1850)   iopamidol (ISOVUE-370) solution 78 mL (78 mLs Intravenous $Given 12/25/23 1849)          Independent Interpretation (X-rays, CTs, rhythm strip):  Yes I have independently reviewed the patient's chest x-ray, no obvious pneumothorax noted        Social Determinants of Health affecting care:   Stress/Adjustment Disorders      Disposition:  The patient was discharged to home.     Impression & Plan        Medical Decision Making:  This is a very pleasant 68-year-old male who presents to the emergency room with left-sided chest pain, which may in fact be due to pancreatitis.  His lipase is elevated and his CT scan does show tail pancreatitis and the patient does state that his pain is worse when he has several Coca[employer]Cola's.  His cardiac workup is highly reassuring as well and this was his main concern for coming today.  We also did do a CT scan since his D-dimer was elevated and thankfully there is no evidence of a blood clot.  There is also no evidence of a pneumonia, and based on my discussion with the patient while no clear cause of his pancreatitis was found or if his fever for that matter, we do feel that he is stable for outpatient management.  No obvious benefit to having him coming in to the hospital for a viral syndrome, and what ever URI symptoms he was having may also be causing his pancreatitis.  Regardless, and under the acknowledgment of this diagnostic uncertainty, we have decided that the patient is stable to follow with  his regular doctor in the next 3 to 4 days since his workup here was thorough and reassuring.  Red flags for when to come back to the ER were discussed in detail.    9:25 PM  Slight elevation in bilirubin and discussion with the patient did lead us to do an ultrasound of the right upper quadrant which thankfully is negative.  Will proceed with discharge as above.  Patient is okay with this plan.    Diagnosis:    ICD-10-CM    1. Acute pancreatitis, unspecified complication status, unspecified pancreatitis type  K85.90            Discharge Medications:  New Prescriptions    No medications on file          12/25/2023   Marcos Ahn*        Marcos Ahn MD  12/25/23 2007       Marcos Ahn MD  12/25/23 2126

## 2023-12-26 NOTE — DISCHARGE INSTRUCTIONS
As we discussed it does look like one of the causes of your symptoms may be due to pancreatitis, which is usually pain that is worse when you eat food.  Please do your best to use a bland diet, use Tylenol and Motrin to help with any pain you have, avoid alcohol, and come back to the ER with any other concerns you have or new symptoms.  Please do follow-up with your regular doctor in the next 1 week as well.    You have a slight elevation in your bilirubin, but your ultrasound is reassuring against any gallbladder pathology.  Please bring this up with your regular doctor in the next 1 week

## 2023-12-28 ENCOUNTER — APPOINTMENT (OUTPATIENT)
Dept: CARDIOLOGY | Facility: CLINIC | Age: 68
DRG: 321 | End: 2023-12-28
Attending: INTERNAL MEDICINE
Payer: MEDICARE

## 2023-12-28 ENCOUNTER — HOSPITAL ENCOUNTER (INPATIENT)
Facility: CLINIC | Age: 68
LOS: 2 days | Discharge: HOME | DRG: 321 | End: 2023-12-30
Attending: EMERGENCY MEDICINE | Admitting: INTERNAL MEDICINE
Payer: MEDICARE

## 2023-12-28 ENCOUNTER — APPOINTMENT (OUTPATIENT)
Dept: CT IMAGING | Facility: CLINIC | Age: 68
DRG: 321 | End: 2023-12-28
Attending: EMERGENCY MEDICINE
Payer: MEDICARE

## 2023-12-28 DIAGNOSIS — I25.10 CORONARY ARTERY DISEASE INVOLVING NATIVE CORONARY ARTERY OF NATIVE HEART WITHOUT ANGINA PECTORIS: Primary | ICD-10-CM

## 2023-12-28 DIAGNOSIS — E87.6 HYPOKALEMIA: ICD-10-CM

## 2023-12-28 DIAGNOSIS — K85.90 ACUTE PANCREATITIS, UNSPECIFIED COMPLICATION STATUS, UNSPECIFIED PANCREATITIS TYPE: ICD-10-CM

## 2023-12-28 DIAGNOSIS — I48.91 ATRIAL FIBRILLATION WITH RAPID VENTRICULAR RESPONSE (H): ICD-10-CM

## 2023-12-28 LAB
ALBUMIN SERPL BCG-MCNC: 4.1 G/DL (ref 3.5–5.2)
ALP SERPL-CCNC: 110 U/L (ref 40–150)
ALT SERPL W P-5'-P-CCNC: 25 U/L (ref 0–70)
ANION GAP SERPL CALCULATED.3IONS-SCNC: 14 MMOL/L (ref 7–15)
APTT PPP: >240 SECONDS (ref 22–38)
AST SERPL W P-5'-P-CCNC: 34 U/L (ref 0–45)
ATRIAL RATE - MUSE: 73 BPM
ATRIAL RATE - MUSE: NORMAL BPM
BASOPHILS # BLD AUTO: 0 10E3/UL (ref 0–0.2)
BASOPHILS NFR BLD AUTO: 1 %
BILIRUB SERPL-MCNC: 0.7 MG/DL
BUN SERPL-MCNC: 23.6 MG/DL (ref 8–23)
CALCIUM SERPL-MCNC: 9.1 MG/DL (ref 8.8–10.2)
CHLORIDE SERPL-SCNC: 96 MMOL/L (ref 98–107)
CHOLEST SERPL-MCNC: 103 MG/DL
CREAT SERPL-MCNC: 1.25 MG/DL (ref 0.67–1.17)
DEPRECATED HCO3 PLAS-SCNC: 24 MMOL/L (ref 22–29)
DIASTOLIC BLOOD PRESSURE - MUSE: NORMAL MMHG
DIASTOLIC BLOOD PRESSURE - MUSE: NORMAL MMHG
EGFRCR SERPLBLD CKD-EPI 2021: 64 ML/MIN/1.73M2
EOSINOPHIL # BLD AUTO: 0.3 10E3/UL (ref 0–0.7)
EOSINOPHIL NFR BLD AUTO: 3 %
ERYTHROCYTE [DISTWIDTH] IN BLOOD BY AUTOMATED COUNT: 12.4 % (ref 10–15)
GLUCOSE BLDC GLUCOMTR-MCNC: 109 MG/DL (ref 70–99)
GLUCOSE BLDC GLUCOMTR-MCNC: 115 MG/DL (ref 70–99)
GLUCOSE BLDC GLUCOMTR-MCNC: 88 MG/DL (ref 70–99)
GLUCOSE SERPL-MCNC: 280 MG/DL (ref 70–99)
HBA1C MFR BLD: 6.4 %
HCT VFR BLD AUTO: 46.8 % (ref 40–53)
HDLC SERPL-MCNC: 28 MG/DL
HGB BLD-MCNC: 16.3 G/DL (ref 13.3–17.7)
HOLD SPECIMEN: NORMAL
IMM GRANULOCYTES # BLD: 0 10E3/UL
IMM GRANULOCYTES NFR BLD: 0 %
INR PPP: 1.03 (ref 0.85–1.15)
INTERPRETATION ECG - MUSE: NORMAL
INTERPRETATION ECG - MUSE: NORMAL
LDLC SERPL CALC-MCNC: 51 MG/DL
LIPASE SERPL-CCNC: 52 U/L (ref 13–60)
LVEF ECHO: NORMAL
LYMPHOCYTES # BLD AUTO: 2.1 10E3/UL (ref 0.8–5.3)
LYMPHOCYTES NFR BLD AUTO: 25 %
MAGNESIUM SERPL-MCNC: 2.2 MG/DL (ref 1.7–2.3)
MCH RBC QN AUTO: 30.4 PG (ref 26.5–33)
MCHC RBC AUTO-ENTMCNC: 34.8 G/DL (ref 31.5–36.5)
MCV RBC AUTO: 87 FL (ref 78–100)
MONOCYTES # BLD AUTO: 0.8 10E3/UL (ref 0–1.3)
MONOCYTES NFR BLD AUTO: 9 %
NEUTROPHILS # BLD AUTO: 5.4 10E3/UL (ref 1.6–8.3)
NEUTROPHILS NFR BLD AUTO: 62 %
NONHDLC SERPL-MCNC: 75 MG/DL
NRBC # BLD AUTO: 0 10E3/UL
NRBC BLD AUTO-RTO: 0 /100
P AXIS - MUSE: 28 DEGREES
P AXIS - MUSE: NORMAL DEGREES
PLATELET # BLD AUTO: 250 10E3/UL (ref 150–450)
POTASSIUM SERPL-SCNC: 2.9 MMOL/L (ref 3.4–5.3)
POTASSIUM SERPL-SCNC: 3.8 MMOL/L (ref 3.4–5.3)
PR INTERVAL - MUSE: 144 MS
PR INTERVAL - MUSE: NORMAL MS
PROT SERPL-MCNC: 7.4 G/DL (ref 6.4–8.3)
QRS DURATION - MUSE: 84 MS
QRS DURATION - MUSE: 90 MS
QT - MUSE: 300 MS
QT - MUSE: 370 MS
QTC - MUSE: 407 MS
QTC - MUSE: 480 MS
R AXIS - MUSE: 19 DEGREES
R AXIS - MUSE: 20 DEGREES
RBC # BLD AUTO: 5.37 10E6/UL (ref 4.4–5.9)
SODIUM SERPL-SCNC: 134 MMOL/L (ref 135–145)
SYSTOLIC BLOOD PRESSURE - MUSE: NORMAL MMHG
SYSTOLIC BLOOD PRESSURE - MUSE: NORMAL MMHG
T AXIS - MUSE: -68 DEGREES
T AXIS - MUSE: 23 DEGREES
T4 FREE SERPL-MCNC: 1.69 NG/DL (ref 0.9–1.7)
TRIGL SERPL-MCNC: 121 MG/DL
TROPONIN T SERPL HS-MCNC: 13 NG/L
TROPONIN T SERPL HS-MCNC: 58 NG/L
TROPONIN T SERPL HS-MCNC: 59 NG/L
TSH SERPL DL<=0.005 MIU/L-ACNC: 7.5 UIU/ML (ref 0.3–4.2)
UFH PPP CHRO-ACNC: 0.29 IU/ML
VENTRICULAR RATE- MUSE: 154 BPM
VENTRICULAR RATE- MUSE: 73 BPM
WBC # BLD AUTO: 8.5 10E3/UL (ref 4–11)

## 2023-12-28 PROCEDURE — 85520 HEPARIN ASSAY: CPT | Performed by: INTERNAL MEDICINE

## 2023-12-28 PROCEDURE — 250N000011 HC RX IP 250 OP 636: Performed by: EMERGENCY MEDICINE

## 2023-12-28 PROCEDURE — 36415 COLL VENOUS BLD VENIPUNCTURE: CPT | Performed by: INTERNAL MEDICINE

## 2023-12-28 PROCEDURE — 250N000009 HC RX 250: Performed by: EMERGENCY MEDICINE

## 2023-12-28 PROCEDURE — 93306 TTE W/DOPPLER COMPLETE: CPT | Mod: 26 | Performed by: INTERNAL MEDICINE

## 2023-12-28 PROCEDURE — 258N000003 HC RX IP 258 OP 636: Performed by: EMERGENCY MEDICINE

## 2023-12-28 PROCEDURE — 80053 COMPREHEN METABOLIC PANEL: CPT | Performed by: EMERGENCY MEDICINE

## 2023-12-28 PROCEDURE — 258N000003 HC RX IP 258 OP 636: Performed by: INTERNAL MEDICINE

## 2023-12-28 PROCEDURE — 85025 COMPLETE CBC W/AUTO DIFF WBC: CPT | Performed by: EMERGENCY MEDICINE

## 2023-12-28 PROCEDURE — 250N000013 HC RX MED GY IP 250 OP 250 PS 637: Performed by: INTERNAL MEDICINE

## 2023-12-28 PROCEDURE — 84439 ASSAY OF FREE THYROXINE: CPT | Performed by: EMERGENCY MEDICINE

## 2023-12-28 PROCEDURE — 250N000011 HC RX IP 250 OP 636: Performed by: INTERNAL MEDICINE

## 2023-12-28 PROCEDURE — 210N000002 HC R&B HEART CARE

## 2023-12-28 PROCEDURE — 36415 COLL VENOUS BLD VENIPUNCTURE: CPT | Performed by: EMERGENCY MEDICINE

## 2023-12-28 PROCEDURE — 84484 ASSAY OF TROPONIN QUANT: CPT | Performed by: EMERGENCY MEDICINE

## 2023-12-28 PROCEDURE — 84132 ASSAY OF SERUM POTASSIUM: CPT | Performed by: INTERNAL MEDICINE

## 2023-12-28 PROCEDURE — 71275 CT ANGIOGRAPHY CHEST: CPT | Mod: MA

## 2023-12-28 PROCEDURE — 83036 HEMOGLOBIN GLYCOSYLATED A1C: CPT | Performed by: INTERNAL MEDICINE

## 2023-12-28 PROCEDURE — 84484 ASSAY OF TROPONIN QUANT: CPT | Performed by: INTERNAL MEDICINE

## 2023-12-28 PROCEDURE — 93005 ELECTROCARDIOGRAM TRACING: CPT

## 2023-12-28 PROCEDURE — 99418 PROLNG IP/OBS E/M EA 15 MIN: CPT | Performed by: INTERNAL MEDICINE

## 2023-12-28 PROCEDURE — 99285 EMERGENCY DEPT VISIT HI MDM: CPT | Mod: 25

## 2023-12-28 PROCEDURE — C8929 TTE W OR WO FOL WCON,DOPPLER: HCPCS

## 2023-12-28 PROCEDURE — 84443 ASSAY THYROID STIM HORMONE: CPT | Performed by: EMERGENCY MEDICINE

## 2023-12-28 PROCEDURE — 99223 1ST HOSP IP/OBS HIGH 75: CPT | Mod: AI | Performed by: INTERNAL MEDICINE

## 2023-12-28 PROCEDURE — 250N000013 HC RX MED GY IP 250 OP 250 PS 637: Performed by: EMERGENCY MEDICINE

## 2023-12-28 PROCEDURE — 83735 ASSAY OF MAGNESIUM: CPT | Performed by: INTERNAL MEDICINE

## 2023-12-28 PROCEDURE — 99207 PR APP CREDIT; MD BILLING SHARED VISIT: CPT | Performed by: INTERNAL MEDICINE

## 2023-12-28 PROCEDURE — 82465 ASSAY BLD/SERUM CHOLESTEROL: CPT | Performed by: INTERNAL MEDICINE

## 2023-12-28 PROCEDURE — 85730 THROMBOPLASTIN TIME PARTIAL: CPT | Performed by: INTERNAL MEDICINE

## 2023-12-28 PROCEDURE — 255N000002 HC RX 255 OP 636: Performed by: INTERNAL MEDICINE

## 2023-12-28 PROCEDURE — 99223 1ST HOSP IP/OBS HIGH 75: CPT | Performed by: INTERNAL MEDICINE

## 2023-12-28 PROCEDURE — 83690 ASSAY OF LIPASE: CPT | Performed by: EMERGENCY MEDICINE

## 2023-12-28 PROCEDURE — 96360 HYDRATION IV INFUSION INIT: CPT

## 2023-12-28 PROCEDURE — 85610 PROTHROMBIN TIME: CPT | Performed by: INTERNAL MEDICINE

## 2023-12-28 PROCEDURE — 96361 HYDRATE IV INFUSION ADD-ON: CPT

## 2023-12-28 RX ORDER — HEPARIN SODIUM 10000 [USP'U]/100ML
0-5000 INJECTION, SOLUTION INTRAVENOUS CONTINUOUS
Status: DISCONTINUED | OUTPATIENT
Start: 2023-12-28 | End: 2023-12-29

## 2023-12-28 RX ORDER — AMOXICILLIN 250 MG
1 CAPSULE ORAL 2 TIMES DAILY PRN
Status: DISCONTINUED | OUTPATIENT
Start: 2023-12-28 | End: 2023-12-30 | Stop reason: HOSPADM

## 2023-12-28 RX ORDER — POLYETHYLENE GLYCOL 3350 17 G/17G
17 POWDER, FOR SOLUTION ORAL 2 TIMES DAILY PRN
Status: DISCONTINUED | OUTPATIENT
Start: 2023-12-28 | End: 2023-12-30 | Stop reason: HOSPADM

## 2023-12-28 RX ORDER — ACETAMINOPHEN 650 MG/1
650 SUPPOSITORY RECTAL EVERY 4 HOURS PRN
Status: DISCONTINUED | OUTPATIENT
Start: 2023-12-28 | End: 2023-12-30 | Stop reason: HOSPADM

## 2023-12-28 RX ORDER — METOPROLOL TARTRATE 1 MG/ML
2.5-5 INJECTION, SOLUTION INTRAVENOUS EVERY 4 HOURS PRN
Status: DISCONTINUED | OUTPATIENT
Start: 2023-12-28 | End: 2023-12-30 | Stop reason: HOSPADM

## 2023-12-28 RX ORDER — NALOXONE HYDROCHLORIDE 0.4 MG/ML
0.2 INJECTION, SOLUTION INTRAMUSCULAR; INTRAVENOUS; SUBCUTANEOUS
Status: DISCONTINUED | OUTPATIENT
Start: 2023-12-28 | End: 2023-12-30 | Stop reason: HOSPADM

## 2023-12-28 RX ORDER — AMOXICILLIN 250 MG
2 CAPSULE ORAL 2 TIMES DAILY PRN
Status: DISCONTINUED | OUTPATIENT
Start: 2023-12-28 | End: 2023-12-30 | Stop reason: HOSPADM

## 2023-12-28 RX ORDER — DAPAGLIFLOZIN 10 MG/1
10 TABLET, FILM COATED ORAL DAILY
COMMUNITY

## 2023-12-28 RX ORDER — LIDOCAINE 40 MG/G
CREAM TOPICAL
Status: DISCONTINUED | OUTPATIENT
Start: 2023-12-28 | End: 2023-12-30 | Stop reason: HOSPADM

## 2023-12-28 RX ORDER — OXYCODONE HYDROCHLORIDE 5 MG/1
5 TABLET ORAL EVERY 4 HOURS PRN
Status: DISCONTINUED | OUTPATIENT
Start: 2023-12-28 | End: 2023-12-29

## 2023-12-28 RX ORDER — NICOTINE POLACRILEX 4 MG
15-30 LOZENGE BUCCAL
Status: DISCONTINUED | OUTPATIENT
Start: 2023-12-28 | End: 2023-12-30 | Stop reason: HOSPADM

## 2023-12-28 RX ORDER — MULTIPLE VITAMINS W/ MINERALS TAB 9MG-400MCG
1 TAB ORAL DAILY
Status: DISCONTINUED | OUTPATIENT
Start: 2023-12-28 | End: 2023-12-30 | Stop reason: HOSPADM

## 2023-12-28 RX ORDER — DEXTROSE MONOHYDRATE 25 G/50ML
25-50 INJECTION, SOLUTION INTRAVENOUS
Status: DISCONTINUED | OUTPATIENT
Start: 2023-12-28 | End: 2023-12-30 | Stop reason: HOSPADM

## 2023-12-28 RX ORDER — CALCIUM CARBONATE 500 MG/1
1000 TABLET, CHEWABLE ORAL 4 TIMES DAILY PRN
Status: DISCONTINUED | OUTPATIENT
Start: 2023-12-28 | End: 2023-12-30 | Stop reason: HOSPADM

## 2023-12-28 RX ORDER — NALOXONE HYDROCHLORIDE 0.4 MG/ML
0.4 INJECTION, SOLUTION INTRAMUSCULAR; INTRAVENOUS; SUBCUTANEOUS
Status: DISCONTINUED | OUTPATIENT
Start: 2023-12-28 | End: 2023-12-30 | Stop reason: HOSPADM

## 2023-12-28 RX ORDER — POTASSIUM CHLORIDE 1500 MG/1
40 TABLET, EXTENDED RELEASE ORAL ONCE
Status: COMPLETED | OUTPATIENT
Start: 2023-12-28 | End: 2023-12-28

## 2023-12-28 RX ORDER — ONDANSETRON 4 MG/1
4 TABLET, ORALLY DISINTEGRATING ORAL EVERY 6 HOURS PRN
Status: DISCONTINUED | OUTPATIENT
Start: 2023-12-28 | End: 2023-12-29

## 2023-12-28 RX ORDER — PROCHLORPERAZINE 25 MG
12.5 SUPPOSITORY, RECTAL RECTAL EVERY 12 HOURS PRN
Status: DISCONTINUED | OUTPATIENT
Start: 2023-12-28 | End: 2023-12-30 | Stop reason: HOSPADM

## 2023-12-28 RX ORDER — ROSUVASTATIN CALCIUM 20 MG/1
40 TABLET, COATED ORAL DAILY
Status: DISCONTINUED | OUTPATIENT
Start: 2023-12-28 | End: 2023-12-30 | Stop reason: HOSPADM

## 2023-12-28 RX ORDER — POTASSIUM CHLORIDE 1500 MG/1
20 TABLET, EXTENDED RELEASE ORAL ONCE
Status: COMPLETED | OUTPATIENT
Start: 2023-12-28 | End: 2023-12-28

## 2023-12-28 RX ORDER — PROCHLORPERAZINE MALEATE 5 MG
5 TABLET ORAL EVERY 6 HOURS PRN
Status: DISCONTINUED | OUTPATIENT
Start: 2023-12-28 | End: 2023-12-30 | Stop reason: HOSPADM

## 2023-12-28 RX ORDER — HEPARIN SODIUM 10000 [USP'U]/100ML
0-5000 INJECTION, SOLUTION INTRAVENOUS CONTINUOUS
Status: DISCONTINUED | OUTPATIENT
Start: 2023-12-28 | End: 2023-12-28

## 2023-12-28 RX ORDER — ONDANSETRON 2 MG/ML
4 INJECTION INTRAMUSCULAR; INTRAVENOUS EVERY 6 HOURS PRN
Status: DISCONTINUED | OUTPATIENT
Start: 2023-12-28 | End: 2023-12-29

## 2023-12-28 RX ORDER — SODIUM CHLORIDE 9 MG/ML
INJECTION, SOLUTION INTRAVENOUS CONTINUOUS
Status: DISCONTINUED | OUTPATIENT
Start: 2023-12-28 | End: 2023-12-28

## 2023-12-28 RX ORDER — SODIUM CHLORIDE AND POTASSIUM CHLORIDE 150; 900 MG/100ML; MG/100ML
INJECTION, SOLUTION INTRAVENOUS CONTINUOUS
Status: DISCONTINUED | OUTPATIENT
Start: 2023-12-28 | End: 2023-12-29

## 2023-12-28 RX ORDER — METOPROLOL SUCCINATE 50 MG/1
50 TABLET, EXTENDED RELEASE ORAL DAILY
Status: DISCONTINUED | OUTPATIENT
Start: 2023-12-28 | End: 2023-12-29

## 2023-12-28 RX ORDER — ACETAMINOPHEN 325 MG/1
650 TABLET ORAL EVERY 4 HOURS PRN
Status: DISCONTINUED | OUTPATIENT
Start: 2023-12-28 | End: 2023-12-30 | Stop reason: HOSPADM

## 2023-12-28 RX ORDER — IOPAMIDOL 755 MG/ML
112 INJECTION, SOLUTION INTRAVASCULAR ONCE
Status: COMPLETED | OUTPATIENT
Start: 2023-12-28 | End: 2023-12-28

## 2023-12-28 RX ADMIN — SODIUM CHLORIDE 1000 ML: 9 INJECTION, SOLUTION INTRAVENOUS at 01:41

## 2023-12-28 RX ADMIN — POTASSIUM CHLORIDE AND SODIUM CHLORIDE: 900; 150 INJECTION, SOLUTION INTRAVENOUS at 08:39

## 2023-12-28 RX ADMIN — SODIUM CHLORIDE 100 ML: 9 INJECTION, SOLUTION INTRAVENOUS at 02:38

## 2023-12-28 RX ADMIN — ROSUVASTATIN CALCIUM 40 MG: 20 TABLET, FILM COATED ORAL at 17:21

## 2023-12-28 RX ADMIN — OMEPRAZOLE 20 MG: 20 CAPSULE, DELAYED RELEASE ORAL at 17:21

## 2023-12-28 RX ADMIN — IOPAMIDOL 112 ML: 755 INJECTION, SOLUTION INTRAVENOUS at 02:38

## 2023-12-28 RX ADMIN — POTASSIUM CHLORIDE 40 MEQ: 1500 TABLET, EXTENDED RELEASE ORAL at 02:33

## 2023-12-28 RX ADMIN — HUMAN ALBUMIN MICROSPHERES AND PERFLUTREN 9 ML: 10; .22 INJECTION, SOLUTION INTRAVENOUS at 14:05

## 2023-12-28 RX ADMIN — SODIUM CHLORIDE: 9 INJECTION, SOLUTION INTRAVENOUS at 06:34

## 2023-12-28 RX ADMIN — POTASSIUM CHLORIDE AND SODIUM CHLORIDE: 900; 150 INJECTION, SOLUTION INTRAVENOUS at 14:54

## 2023-12-28 RX ADMIN — HEPARIN SODIUM 1150 UNITS/HR: 10000 INJECTION, SOLUTION INTRAVENOUS at 23:56

## 2023-12-28 RX ADMIN — HEPARIN SODIUM 1150 UNITS/HR: 10000 INJECTION, SOLUTION INTRAVENOUS at 08:56

## 2023-12-28 RX ADMIN — POTASSIUM CHLORIDE 20 MEQ: 1500 TABLET, EXTENDED RELEASE ORAL at 13:05

## 2023-12-28 RX ADMIN — SODIUM CHLORIDE 500 ML: 9 INJECTION, SOLUTION INTRAVENOUS at 08:38

## 2023-12-28 RX ADMIN — METOPROLOL SUCCINATE 50 MG: 50 TABLET, EXTENDED RELEASE ORAL at 10:01

## 2023-12-28 RX ADMIN — MULTIPLE VITAMINS W/ MINERALS TAB 1 TABLET: TAB at 17:21

## 2023-12-28 RX ADMIN — SODIUM CHLORIDE 1000 ML: 9 INJECTION, SOLUTION INTRAVENOUS at 02:55

## 2023-12-28 ASSESSMENT — ACTIVITIES OF DAILY LIVING (ADL)
ADLS_ACUITY_SCORE: 22
ADLS_ACUITY_SCORE: 35
ADLS_ACUITY_SCORE: 22
ADLS_ACUITY_SCORE: 22
ADLS_ACUITY_SCORE: 35
ADLS_ACUITY_SCORE: 35
ADLS_ACUITY_SCORE: 20
ADLS_ACUITY_SCORE: 22
ADLS_ACUITY_SCORE: 35
ADLS_ACUITY_SCORE: 35
ADLS_ACUITY_SCORE: 22

## 2023-12-28 NOTE — ED NOTES
[facility]  ED Nurse Handoff Report    ED Chief complaint: Palpitations      ED Diagnosis:   Final diagnoses:   None       Code Status: TO BE ADDRESSED    Allergies: No Known Allergies    Patient Story: presented today due to palpitation,his apple watch said he is on AF,recently discharge because of pancreatitis.  Focused Assessment:  having SOB as claimed,erratic HR ranging from 120-154/min,episode of low BP  but no other signs of hypoperfusion,alert,describes his chest pain more of tightness.    Treatments and/or interventions provided: iv access,labs,ct,replaced potassium,IVF  Patient's response to treatments and/or interventions: Tolerated,monitored his HR    To be done/followed up on inpatient unit:  as per admission order    Does this patient have any cognitive concerns?:  NONE    Activity level - Baseline/Home:  Independent  Activity Level - Current:   Independent    Patient's Preferred language: English   Needed?: No    Isolation: None  Infection: Not Applicable  Patient tested for COVID 19 prior to admission: NO  Bariatric?: No    Vital Signs:   Vitals:    12/28/23 0142 12/28/23 0157 12/28/23 0224 12/28/23 0225   BP: 108/73 112/80 133/74    Pulse: (!) 156 (!) 131 (!) 121 (!) 144   Resp: 25 18  23   Temp:    97.7  F (36.5  C)   TempSrc:    Oral   SpO2: 94% 94%         Cardiac Rhythm:Cardiac Rhythm: Atrial fibrillation    Was the PSS-3 completed:   Yes  What interventions are required if any?               Family Comments: none  OBS brochure/video discussed/provided to patient/family: No              Name of person given brochure if not patient: none              Relationship to patient: none    For the majority of the shift this patient's behavior was Green.   Behavioral interventions performed were none.    ED NURSE PHONE NUMBER: [phone]

## 2023-12-28 NOTE — Clinical Note
The first balloon was inserted into the right coronary artery and middle right coronary artery.Max pressure = 14 ángel. Total duration = 30 seconds.     Max pressure = 14 ángel. Total duration = 30 seconds.    Balloon reinflated a second time: Max pressure = 14 ángel. Total duration = 30 seconds.

## 2023-12-28 NOTE — H&P
[facility]    History and Physical - Hospitalist Service       Date of Admission:  12/28/2023     Assessment & Plan      Manan Bryant is a 68 year old male with a history of CAD, HTN, HLD, CKD, SVT, PVCs who is admitted on 12/28/2023 with afib with RVR.     New onset atrial fibrillation with RVR  Frequent PVCs  Paroxysmal SVT  In review of the cardiology notes patient has had long standing issues with PVCs for which he has had an EP study, loop recorder and been on various beta blockers and was deemed high risk for progression to afib. Suspect that this current episode is precipitated by the pancreatitis with his underlying rhythm issues. [id number] is 3.   -continue PTA Toprol XL  -prn IV metoprolol for tachycardia  -continue IVF  -TTE  -cardiology consult  -tele  -likely benefit from a DOAC    Acute pancreatitis - improving  Initial diagnosis on 12/25, lipase and bili have since normalized. He is tolerate a diet without making his pain worse. Abd US on 12/25 negative for cholelithiasis. Calcium normal. Does not drink alcohol.   -check lipid panel  -diet as tolerated  -IVF as above    CKD stage 2  Hypokalemia  At his baseline creatinine is about 1.2-1.3. K 2.9 on presentation.   -hold hydrochlorothiazide   -IVF as above  -potassium replacement protocol  -repeat BMP tomorrow morning    CAD - ELLIOTT to LAD in 2010  Hypertension  Dyslipidemia  Troponin negative and stable over multiple checks on 12/25 and 12/28. Unlikely to be ischemia.   -tele as above for new afib  -continue PTA ASA, metoprolol, losartan and crestor    Mildly elevated TSH  TSH 7.5, free T4 normal at 1.69.   -follow up with PCP for recheck    Elevated glucose  Glucose 280 on presentation, normal to borderline elevated in the past. Hgb A1C of 6.1 in April 2023. Likely has pre-diabetes.   -check Hgb A1C  -BID glc checks for now       Diet:  Regular  DVT Prophylaxis: Pneumatic Compression Devices  Code Status:  Full,  discussed with the patient    Disposition: 1-2 days pending cardiac work up    Clinically Significant Risk Factors Present on Admission        # Hypokalemia: Lowest K = 2.9 mmol/L in last 2 days, will replace as needed         # Drug Induced Platelet Defect: home medication list includes an antiplatelet medication     # Hypertension: Noted on problem list                    Silvio Whitney,   Hospitalist Service  [facility]  Securely message with b[employer] (more info)  Text page via [employer] Paging/Directory     ______________________________________________________________________    Chief Complaint   tachycardia    History is obtained from the patient and ED physician    History of Present Illness   Manan Bryant is a 68 year old male who has a history of CAD, Htn, SVT, PVCs, CKD who presents to the ED with concerns of tachycardia. Patient notes that on 12/23 he started having some cold like symptoms of cough and subsequent developed left sided abdominal and chest pain which prompted a visit to the ED on 12/25 where he was diagnosed with pancreatitis. He had a CT of the chest with contrast at that time negative for PE. Lipase and bili mildly elevated. COVID/RSV/flu negative. He was felt able to manage as an outpatient so discharged to home. Since that time he has been able to eat at times, things like oatmeal and fruit which has not caused any worsening of his abdominal pain though he felt like it wasn't quite normal. He has been trying to drink fluids but probably hasn't been eating and drinking enough. His fever has dissipated that began on [place]. Last night he ate some more food because he was hungry and then started noting palpitations. He does get palpitations at times though nothing like this. His apple watch told him he had afib which is a new diagnosis for him. He does have a history of CAD with stent in 2010 and subsequently has had issues with frequent PVCs and  intermittent SVT for which he follows with cardiology, has had EP studies and had a cardiac MRI stress test about a year ago. He has tried various beta blockers and currently is on Toprol XL. He has not missed any doses.     In the ED he was noted to be in afib with RVR, rates into the 150s at times. CT PE study done again and negative, did show pancreatitis at that tail. Lipase and bili have normalized. He denies any left sided abdominal pain or chest pain currently. Received IVF in the ED. Admission requested due to new afib with RVR.       Past Medical History    Past Medical History:   Diagnosis Date    CAD (coronary artery disease)     cardiac cath 8/5/2010: ELLIOTT to LAD    Essential hypertension, benign     Family history of early CAD     History of acute anterior wall MI 2010 2010    History of colonic polyps     Mixed hyperlipidemia     Myocardial infarction (H)     Paroxysmal supraventricular tachycardia (H)     PVC (premature ventricular contraction)     Hx ventricular tachycardia during cardiac rehab 2010    SVT (supraventricular tachycardia) (H)     Syncope     episode 2016 - EP study - loop recorder implanted 2016 - non-functional after 3 years       Past Surgical History   Past Surgical History:   Procedure Laterality Date    COLONOSCOPY      STENT, CORONARY, EMEKA  8/2010    LAD       Prior to Admission Medications   Prior to Admission Medications   Prescriptions Last Dose Informant Patient Reported? Taking?   ASPIRIN PO   Yes No   Sig: Take 81 mg by mouth daily   Cholecalciferol (VITAMIN D PO)   Yes No   Sig: Take 1,000 mg by mouth daily    Multiple Vitamin (MULTI-VITAMIN PO)   Yes No   Sig: Take by mouth daily    hydrochlorothiazide (HYDRODIURIL) 25 MG tablet   No No   Sig: Take 1 tablet (25 mg) by mouth daily   losartan (COZAAR) 100 MG tablet   No No   Sig: Take 1 tablet (100 mg) by mouth daily   metoprolol succinate ER (TOPROL XL) 50 MG 24 hr tablet   No No   Sig: Take 1 tablet (50 mg) by mouth  daily   nitroGLYcerin (NITROSTAT) 0.4 MG sublingual tablet   No No   Sig: Place 1 tablet (0.4 mg) under the tongue every 5 minutes as needed for chest pain   Patient not taking: Reported on 1/3/2023   omeprazole (PRILOSEC) 20 MG DR capsule   No No   Sig: TAKE 1 CAPSULE BY MOUTH EVERY DAY   rosuvastatin (CRESTOR) 40 MG tablet   No No   Sig: Take 1 tablet (40 mg) by mouth daily      Facility-Administered Medications: None        Review of Systems    The 10 point Review of Systems is negative other than noted in the HPI or here.     Social History   I have reviewed this patient's social history and updated it with pertinent information if needed.  Social History     Tobacco Use    Smoking status: Never    Smokeless tobacco: Never   Substance Use Topics    Alcohol use: No     Alcohol/week: 0.0 standard drinks of alcohol    Drug use: No        Physical Exam   Vital Signs: Temp: 97.7  F (36.5  C) Temp src: Oral BP: 107/59 Pulse: (!) 123   Resp: 18 SpO2: 97 % O2 Device: None (Room air)    Weight: 0 lbs 0 oz    Gen: lying in bed, appears comfortable  CV: Irregular, tachycardic.   Pulm: CTAB, no wheeze or rhonchi  GI: +BS, soft, NT/ND  Lymph: no edema    Medical Decision Making       65 MINUTES SPENT BY ME on the date of service doing chart review, history, exam, documentation & further activities per the note.      Data     I have personally reviewed the following data over the past 24 hrs:    8.5  \   16.3   / 250     134 (L) 96 (L) 23.6 (H) /  280 (H)   2.9 (L) 24 1.25 (H) \     ALT: 25 AST: 34 AP: 110 TBILI: 0.7   ALB: 4.1 TOT PROTEIN: 7.4 LIPASE: 52     Trop: 13 BNP: N/A     TSH: 7.50 (H) T4: 1.69 A1C: N/A       Imaging results reviewed over the past 24 hrs:   Recent Results (from the past 24 hour(s))   CT Chest (PE) Abdomen Pelvis w Contrast    Narrative    EXAM: CT CHEST PE ABDOMEN PELVIS W CONTRAST  LOCATION: [facility]  DATE: 12/28/2023    INDICATION: sob, tachycardia,  pancreatitis  COMPARISON: CT chest 12/25/2023  TECHNIQUE: CT chest pulmonary angiogram and routine CT abdomen pelvis with IV contrast. Arterial phase through the chest and venous phase through the abdomen and pelvis. Multiplanar reformats and MIP reconstructions were performed. Dose reduction   techniques were used.   CONTRAST: 112 mL Isovue   370    FINDINGS:  ANGIOGRAM CHEST: Pulmonary arteries are normal caliber and negative for pulmonary emboli. Thoracic aorta is negative for dissection. No CT evidence of right heart strain.     LUNGS AND PLEURA: Mild left basilar atelectasis. Lungs otherwise clear. No pleural effusions. No pneumothorax.    MEDIASTINUM/AXILLAE: No lymphadenopathy. No thoracic aortic aneurysms. No pericardial effusion. Left chest wall implanted cardiac device. Small hiatal hernia.    CORONARY ARTERY CALCIFICATION: Mild.    HEPATOBILIARY: No significant mass or bile duct dilatation. No calcified gallstones.     PANCREAS: Fat stranding about the pancreatic tail. No organized fluid collection. No pancreatic duct dilation.    SPLEEN: Normal.    ADRENAL GLANDS: Normal.    KIDNEYS/BLADDER: Normal.    BOWEL: No obstruction or inflammatory change. Normal appendix.    LYMPH NODES: Normal.    VASCULATURE: Unremarkable.    PELVIC ORGANS: Normal.    MUSCULOSKELETAL: Small periumbilical fat-containing hernia. Mild degenerative changes of the spine.      Impression    IMPRESSION:  1.  No pulmonary embolism. No acute process in the chest.  2.  Acute pancreatitis.

## 2023-12-28 NOTE — ED NOTES
Still having mild chest tightness,erratic HR ranging from 107-120/min,informed MD-,for admission.      Hospitalist at bedside.

## 2023-12-28 NOTE — PROGRESS NOTES
[facility]    Hospitalist Progress Note    Assessment & Plan   Date of Admission:  12/28/2023         Assessment & Plan  Manan Bryant is a 68 year old male with a history of CAD, HTN, HLD, CKD, SVT, PVCs who is admitted on 12/28/2023 with afib with RVR.      New onset atrial fibrillation with RVR  Frequent PVCs  Paroxysmal SVT  In review of the cardiology notes patient has had long standing issues with PVCs for which he has had an EP study, loop recorder and been on various beta blockers and was deemed high risk for progression to afib. Suspect that this current episode is precipitated by the pancreatitis with his underlying rhythm issues. [id number] is 3.   -given 2.5L in ED  - maint fluids started  - K 2 range and replaced to nl  - converted to NsR am 12/28.   - EKG without ischemic changes on NSR EKG 12/28.   - initated on PTA metoprolol.   - not need dilt gtt yet.   -Mg nl   - TSH mildly elevated  - no chf.   - no etoh   -Echo: nl valve dz, nl EF. No WMA, see below    Plan;  - follow K. Want K >4 range.   -continue PTA Toprol XL  -continue IVF, reduce rate  -cardiology consulted and following  -tele  -DOAC followoing angiogram. Per pharm liaison, Xarrelto likely best option for patient.   - am bmp     Acute pancreatitis - improving  Unclear etiology   Initial diagnosis on 12/25, lipase and bili have since normalized. He is tolerate a diet without making his pain worse. Abd US on 12/25 negative for cholelithiasis. Calcium normal. Does not drink alcohol.   -check lipid panel--> .   - no eteh.   - nl abd US 12/25.    - no abd pain this am. Benign abd  -Lipase 52 now.   Plan;     -diet as tolerated  -IVF, reduce to 75cc/hr  -follow  -GI consult before discharge as unclear etiology      CKD stage 2  Hypokalemia-resolved.   At his baseline creatinine is about 1.2-1.3. K 2.9 on presentation.   -hold hydrochlorothiazide   -K replaced to nl range  Plan;   -IVF as above, reduce to  75cc/hr   -potassium replacement protocol  -repeat BMP tomorrow morning     CAD - ELLIOTT to LAD in 2010  Hypertension  Dyslipidemia  Troponin negative and stable over multiple checks on 12/25 and 12/28. Unlikely to be ischemia.   -tele as above for new afib  -continue PTA ASA, metoprolol,crestor  -hold losartan for now as low nl sbp and leave room for rate control agents if needed.   - tele  -EKG in NSR without ischemic changes  - Echo;   the visual ejection fraction is 55-60%. Mild LVH.  The right ventricle is normal in size and function.  There is no pericardial effusion.  -seen by cardiology   -pt has some mild cp on exam this am  - has had intermittent cp prior to admission.   - per cardiology, pt had cardiac MRI with stress demonstrating normal biventricular size with normal systolic function. Quantitative LVEF 63 %. Quantitative RVEF 57%. Regadenoson induced stress perfusion is negative for ischemia.   Delayed hyperenhancement reveals a small, subendocardial scar in the apex and apicoseptal portion of the left ventricle consistent with a small scar in the LAD distribution.   -no hx coronary angiogram.     -on crestor LDL 51, HDL 28.     Plan-  -heparin gtt for possibly ACS and afib  - asa  - angiogram tomorrow  - tele  - cont PTA crestor  - npo midnight  -am labs          Mildly elevated TSH  TSH 7.5, free T4 normal at 1.69.   -follow up with PCP for recheck      Elevated glucose  Glucose 280 on presentation, normal to borderline elevated in the past. Hgb A1C of 6.1 in April 2023. Likely has pre-diabetes.   - Hgb A1C 6.4%.   - this am. Now 115.     -hold PTA Farxiga  - dm diet  - ISS with prandial dosing for now in hospital  - pcp follow up      Diet:  Regular  DVT Prophylaxis: Pneumatic Compression Devices  Code Status:  Full,      Disposition: 1-2 days pending cardiac work up     Clinically Significant Risk Factors Present on Admission          # Hypokalemia: Lowest K = 2.9 mmol/L in last 2 days, will  replace as needed         # Drug Induced Platelet Defect: home medication list includes an antiplatelet medication      # Hypertension: Noted on problem list           Dispo: anticipate discharge home in 2 days once cardiac work up completed.   Pcp and cardiology follow up     Complex care, > 55 min on care coordination with rn, exam X 2, charting, chart review. ,     Manan Dial MD, MD  Text Page  (7am to 6pm)  Interval History   Seen this am. Afib with rvr. Given fluid bolus for sbp 90s/-.   No abd pain. No cp.   Repeat trop 50 range and flat trend  Converted to NSR HR 70s in ED.   Seen by cardiology. Angiogram for tomorrow given hx of apical scar on prior imaging and intermitent cp outpatient.     -Data reviewed today: I reviewed all new labs and imaging results over the last 24 hours. I personally reviewed imaging and labs since admission     Physical Exam   Temp: 97.7  F (36.5  C) Temp src: Oral BP: 110/67 Pulse: 70   Resp: 22 SpO2: 95 % O2 Device: None (Room air)    Vitals:    12/28/23 0758   Weight: 97.5 kg (215 lb)     Vital Signs with Ranges  Temp:  [97  F (36.1  C)-98.3  F (36.8  C)] 97.7  F (36.5  C)  Pulse:  [] 70  Resp:  [8-34] 22  BP: ()/(59-88) 110/67  SpO2:  [93 %-97 %] 95 %  I/O last 3 completed shifts:  In: -   Out: 280 [Urine:280]    Constitutional: Nad, up in bed  Neck: nl jvp  Respiratory: CTAB  Cardiovascular: irreg irreg rhyhtm this am, tachy. No r/g/m  GI: soft, nt, nd  Skin/Integumen: no rash or edema  Neuro: nl speech and mentation  Psych: nl affect       Medications    0.9% sodium chloride + KCl 20 mEq/L 75 mL/hr at 12/28/23 1447    dilTIAZem Stopped (12/28/23 1034)    heparin 1,150 Units/hr (12/28/23 1205)    Reason anticoagulant not prescribed for atrial fibrillation        insulin aspart  1-7 Units Subcutaneous TID AC    insulin aspart  1-5 Units Subcutaneous At Bedtime    insulin aspart   Subcutaneous TID w/meals    metoprolol succinate ER  50 mg Oral  Daily    multivitamin w/minerals  1 tablet Oral Daily    omeprazole  20 mg Oral Daily    rosuvastatin  40 mg Oral Daily    sodium chloride (PF)  3 mL Intracatheter Q8H       Data   Recent Labs   Lab 12/28/23  1220 12/28/23  0653 12/28/23  0123 12/25/23  1745 12/25/23  1424   WBC  --   --  8.5  --  13.6*   HGB  --   --  16.3  --  16.9   MCV  --   --  87  --  88   PLT  --   --  250  --  246   INR  --   --  1.03  --   --    NA  --   --  134*  --  135   POTASSIUM  --  3.8 2.9*  --  3.8   CHLORIDE  --   --  96*  --  98   CO2  --   --  24  --  23   BUN  --   --  23.6*  --  21.2   CR  --   --  1.25*  --  1.27*   ANIONGAP  --   --  14  --  14   LES  --   --  9.1  --  9.5   *  --  280*  --  121*   ALBUMIN  --   --  4.1 4.2  --    PROTTOTAL  --   --  7.4 7.2  --    BILITOTAL  --   --  0.7 1.8*  --    ALKPHOS  --   --  110 110  --    ALT  --   --  25 17  --    AST  --   --  34 26  --    LIPASE  --   --  52 127*  --        Imaging:   Recent Results (from the past 24 hour(s))   CT Chest (PE) Abdomen Pelvis w Contrast    Narrative    EXAM: CT CHEST PE ABDOMEN PELVIS W CONTRAST  LOCATION: [facility]  DATE: 12/28/2023    INDICATION: sob, tachycardia, pancreatitis  COMPARISON: CT chest 12/25/2023  TECHNIQUE: CT chest pulmonary angiogram and routine CT abdomen pelvis with IV contrast. Arterial phase through the chest and venous phase through the abdomen and pelvis. Multiplanar reformats and MIP reconstructions were performed. Dose reduction   techniques were used.   CONTRAST: 112 mL Isovue   370    FINDINGS:  ANGIOGRAM CHEST: Pulmonary arteries are normal caliber and negative for pulmonary emboli. Thoracic aorta is negative for dissection. No CT evidence of right heart strain.     LUNGS AND PLEURA: Mild left basilar atelectasis. Lungs otherwise clear. No pleural effusions. No pneumothorax.    MEDIASTINUM/AXILLAE: No lymphadenopathy. No thoracic aortic aneurysms. No pericardial effusion. Left chest  wall implanted cardiac device. Small hiatal hernia.    CORONARY ARTERY CALCIFICATION: Mild.    HEPATOBILIARY: No significant mass or bile duct dilatation. No calcified gallstones.     PANCREAS: Fat stranding about the pancreatic tail. No organized fluid collection. No pancreatic duct dilation.    SPLEEN: Normal.    ADRENAL GLANDS: Normal.    KIDNEYS/BLADDER: Normal.    BOWEL: No obstruction or inflammatory change. Normal appendix.    LYMPH NODES: Normal.    VASCULATURE: Unremarkable.    PELVIC ORGANS: Normal.    MUSCULOSKELETAL: Small periumbilical fat-containing hernia. Mild degenerative changes of the spine.      Impression    IMPRESSION:  1.  No pulmonary embolism. No acute process in the chest.  2.  Acute pancreatitis.   Echocardiogram Complete   Result Value    LVEF  55-60%    Narrative    [id number]  [id number]  [id number]  [id number]^GOPAL^CLARE     [facility]  Echocardiography Laboratory  [address] [place] [address]     Name: HENRY MEJIA  MRN: [id number]  : 1955  Study Date: 2023 01:35 PM  Age: 68 yrs  Gender: Male  Patient Location: [facility]  Reason For Study: Atrial Fibrillation  Ordering Physician: CLARE HERRON  Referring Physician: Ric Villela MD  Performed By: Elayne Preciado     BSA: 2.2 m2  Height: 71 in  Weight: 215 lb  HR: 62  BP: 110/67 mmHg  ______________________________________________________________________________  Procedure  Complete Portable Echo Adult. Optison (NDC #2277-5239) given intravenously.  ______________________________________________________________________________  Interpretation Summary     The visual ejection fraction is 55-60%. Mild LVH.  The right ventricle is normal in size and function.  There is no pericardial effusion.  ______________________________________________________________________________  Left Ventricle  The left ventricle is normal in size. There is mild concentric left  ventricular hypertrophy. Proximal  septal thickening is noted. The visual  ejection fraction is 55-60%. Diastolic Doppler findings (E/E' ratio and/or  other parameters) suggest left ventricular filling pressures are normal. No  regional wall motion abnormalities noted.     Right Ventricle  The right ventricle is normal in size and function.     Atria  Normal left atrial size. Right atrial size is normal.     Mitral Valve  The mitral valve is normal in structure and function. There is trace mitral  regurgitation.     Tricuspid Valve  The tricuspid valve is not well visualized, but is grossly normal. The right  ventricular systolic pressure is approximated at 15.8 mmHg plus the right  atrial pressure. There is trace tricuspid regurgitation.     Aortic Valve  The aortic valve is normal in structure and function.     Pulmonic Valve  The pulmonic valve is not well visualized.     Vessels  The aortic root is normal size. IVC diameter and respiratory changes fall into  an intermediate range suggesting an RA pressure of 8 mmHg.     Pericardium  There is no pericardial effusion.     ______________________________________________________________________________  MMode/2D Measurements & Calculations  IVSd: 1.2 cm  LVIDd: 5.3 cm  LVIDs: 3.2 cm  LVPWd: 1.2 cm  FS: 38.9 %  LV mass(C)d: 256.6 grams  LV mass(C)dI: 118.0 grams/m2     Ao root diam: 3.5 cm  LA dimension: 4.8 cm  asc Aorta Diam: 3.6 cm  LA/Ao: 1.4  LVOT diam: 2.0 cm  LVOT area: 3.2 cm2  Ao root diam index Ht(cm/m): 2.0  Ao root diam index BSA (cm/m2): 1.6  Asc Ao diam index BSA (cm/m2): 1.6  Asc Ao diam index Ht(cm/m): 2.0  LA Volume (BP): 68.3 ml  LA Volume Index (BP): 31.5 ml/m2  RV Base: 4.1 cm     RWT: 0.45  TAPSE: 1.8 cm     Doppler Measurements & Calculations  MV E max payam: 73.4 cm/sec  MV A max payam: 38.7 cm/sec  MV E/A: 1.9  MV dec time: 0.15 sec  PA acc time: 0.12 sec  TR max payam: 198.7 cm/sec  TR max PG: 15.8 mmHg  E/E' av.6  Lateral E/e': 5.2  Medial E/e': 6.1  RV S Payam: 14.4 cm/sec      ______________________________________________________________________________  Report approved by: Emily Deleon 12/28/2023 02:40 PM

## 2023-12-28 NOTE — Clinical Note
The first balloon was inserted into the right coronary artery.Max pressure = 16 ángel. Total duration = 20 seconds.     Max pressure = 18 ángel. Total duration = 20 seconds.    Balloon reinflated a second time: Max pressure = 18 ángel. Total duration = 20 seconds.  Balloon reinflated a third time: Max pressure = 20 ángel. Total duration = 20 seconds.

## 2023-12-28 NOTE — CONSULTS
[facility]    Cardiology Consultation     Date of Admission:  12/28/2023    Assessment & Plan     This is a 68 yr old male with PMH  CAD, HTN, HLD, CKD, SVT admitted with AFIB with RVR in setting of pancreatitis. He has history of PVCs, had EP study and loop recorder in the past. He presented on 12/25 with diagnosis of pancreatitis. Received IVFs and his heart rate has since normalized. At time of consult back to 65 bpm.  He was placed on Toprol XL, with prn IV as well. TTE pending at time of consult. During rapid AFIB he had notable TWI and mildly elevated TN, which remained flat 58 --> 59).  Now on heparin and diltiazem infusion.    Of note has been having on and off chest pressure. He had cardiac MRI with stress demonstrating normal biventricular size with normal systolic function. Quantitative LVEF 63 %. Quantitative RVEF 57%. Regadenoson induced stress perfusion is negative for ischemia.   Delayed hyperenhancement reveals a small, subendocardial scar in the apex and apicoseptal portion of the left ventricle consistent with a small scar in the LAD distribution. He has never had coronary angiogram. He is patient of Dr. Corley.     Recommendations:     1. Coronary angiogram tomorrow, risks and benefits described and patient wishes to proceed with procedure.   2. Continue heparin infusion   3. Continue diltiazem infusion for now but as pancreatitis resolves can transition to oral metoprolol   4. Will determine appropriate anticoagulation therapy post catheterization   5. Echocardiogram pending, will review   6. Aspirin loaded, continue aspirin 81 mg daily thereafter             High complexity visit.       Aleshia Bell MD    Primary Care Physician   Ric Villela MD    Reason for Consult   Rapid AFIB     History of Present Illness     This is a 68 yr old male with PMH  CAD, HTN, HLD, CKD, SVT admitted with AFIB with RVR in setting of pancreatitis. He has history of PVCs,  had EP study and loop recorder in the past. He presented on 12/25 with diagnosis of pancreatitis. Received IVFs and his heart rate has since normalized. At time of consult back to 65 bpm.  He was placed on Toprol XL, with prn IV as well. TTE pending at time of consult. During rapid AFIB he had notable TWI and mildly elevated TN, which remained flat 58 --> 59).  Now on heparin and diltiazem infusion.    Of note has been having on and off chest pressure. He had cardiac MRI with stress demonstrating normal biventricular size with normal systolic function. Quantitative LVEF 63 %. Quantitative RVEF 57%. Regadenoson induced stress perfusion is negative for ischemia.   Delayed hyperenhancement reveals a small, subendocardial scar in the apex and apicoseptal portion of the left ventricle consistent with a small scar in the LAD distribution. He has never had coronary angiogram. He is patient of Dr. Corley.     Of note no sob, leg swelling, PND.     Past Medical History   Past Medical History:   Diagnosis Date    CAD (coronary artery disease)     cardiac cath 8/5/2010: ELLIOTT to LAD    Essential hypertension, benign     Family history of early CAD     History of acute anterior wall MI 2010 2010    History of colonic polyps     Mixed hyperlipidemia     Myocardial infarction (H)     Paroxysmal supraventricular tachycardia (H)     PVC (premature ventricular contraction)     Hx ventricular tachycardia during cardiac rehab 2010    SVT (supraventricular tachycardia) (H)     Syncope     episode 2016 - EP study - loop recorder implanted 2016 - non-functional after 3 years       Past Surgical History   Past Surgical History:   Procedure Laterality Date    COLONOSCOPY      STENT, CORONARY, EMEKA  8/2010    LAD       Prior to Admission Medications   Prior to Admission Medications   Prescriptions Last Dose Informant Patient Reported? Taking?   ASPIRIN PO 12/27/2023 at 0900  Yes Yes   Sig: Take 81 mg by mouth daily   Cholecalciferol  (VITAMIN D PO) 12/27/2023 at 0900  Yes Yes   Sig: Take 1,000 mg by mouth daily    Multiple Vitamin (MULTI-VITAMIN PO) 12/27/2023 at 0900  Yes Yes   Sig: Take by mouth daily    dapagliflozin (FARXIGA) 10 MG TABS tablet 12/27/2023 at 0900  Yes Yes   Sig: Take 10 mg by mouth daily   hydrochlorothiazide (HYDRODIURIL) 25 MG tablet 12/27/2023 at 0900  No Yes   Sig: Take 1 tablet (25 mg) by mouth daily   losartan (COZAAR) 100 MG tablet 12/27/2023 at 0900  No Yes   Sig: Take 1 tablet (100 mg) by mouth daily   metoprolol succinate ER (TOPROL XL) 50 MG 24 hr tablet 12/27/2023 at 0900  No Yes   Sig: Take 1 tablet (50 mg) by mouth daily   nitroGLYcerin (NITROSTAT) 0.4 MG sublingual tablet   No No   Sig: Place 1 tablet (0.4 mg) under the tongue every 5 minutes as needed for chest pain   Patient not taking: Reported on 1/3/2023   omeprazole (PRILOSEC) 20 MG DR capsule 12/27/2023 at 0900  No Yes   Sig: TAKE 1 CAPSULE BY MOUTH EVERY DAY   rosuvastatin (CRESTOR) 40 MG tablet 12/27/2023 at 0900  No Yes   Sig: Take 1 tablet (40 mg) by mouth daily      Facility-Administered Medications: None     Current Facility-Administered Medications   Medication Dose Route Frequency    insulin aspart  1-7 Units Subcutaneous TID AC    insulin aspart  1-5 Units Subcutaneous At Bedtime    insulin aspart   Subcutaneous TID w/meals    metoprolol succinate ER  50 mg Oral Daily    sodium chloride (PF)  3 mL Intracatheter Q8H     Current Facility-Administered Medications   Medication Last Rate    0.9% sodium chloride + KCl 20 mEq/L 125 mL/hr at 12/28/23 1204    dilTIAZem Stopped (12/28/23 1034)    heparin 1,150 Units/hr (12/28/23 1205)    Reason anticoagulant not prescribed for atrial fibrillation       Allergies   No Known Allergies    Social History    reports that he has never smoked. He has never used smokeless tobacco. He reports that he does not drink alcohol and does not use drugs.      Family History   I have reviewed this patient's family history  and updated it with pertinent information if needed.  Family History   Problem Relation Age of Onset    Coronary Artery Disease Father     Myocardial Infarction Father     Coronary Artery Disease Brother     Myocardial Infarction Brother     Heart Surgery Brother         bypass    Coronary Artery Disease Paternal Grandfather     Myocardial Infarction Paternal Grandfather     Sleep Apnea Sister     Family History Negative Mother     Family History Negative Maternal Grandmother     Heart Failure Maternal Grandfather     Family History Negative Paternal Grandmother     Myocardial Infarction Brother     Coronary Artery Disease Brother     Heart Surgery Brother         bypass    Coronary Artery Disease Brother     Obesity Sister     Obesity Brother     Obesity Brother           Review of Systems   A comprehensive review of system was performed and is negative other than that noted in the HPI or here.     Physical Exam   Vital Signs with Ranges  Temp:  [97  F (36.1  C)-98.3  F (36.8  C)] 97.7  F (36.5  C)  Pulse:  [] 65  Resp:  [8-34] 20  BP: ()/(59-88) 110/67  SpO2:  [93 %-97 %] 97 %  Wt Readings from Last 4 Encounters:   12/28/23 97.5 kg (215 lb)   01/03/23 101.2 kg (223 lb)   10/18/22 101.2 kg (223 lb)   06/12/22 98.4 kg (217 lb)     I/O last 3 completed shifts:  In: -   Out: 280 [Urine:280]      Vitals: /67   Pulse 65   Temp 97.7  F (36.5  C)   Resp 20   Wt 97.5 kg (215 lb)   SpO2 97%   BMI 29.57 kg/m      Physical Exam:   General - Alert and oriented to time place and person in no acute distress  Eyes - No scleral icterus  HEENT - Neck supple, moist mucous membranes  Cardiovascular - irr irr no mrg   Extremities - There is no edema  Respiratory - CTAB  Skin - No pallor or cyanosis  Gastrointestinal - Non tender and non distended without rebound or guarding  Psych - Appropriate affect   Neurological - No gross motor neurological focal deficits    No lab results found in last 7 days.    Invalid  "input(s): \"TROPONINIES\"    Recent Labs   Lab 12/28/23  1220 12/28/23  0653 12/28/23  0123 12/25/23  1745 12/25/23  1424   WBC  --   --  8.5  --  13.6*   HGB  --   --  16.3  --  16.9   MCV  --   --  87  --  88   PLT  --   --  250  --  246   INR  --   --  1.03  --   --    NA  --   --  134*  --  135   POTASSIUM  --  3.8 2.9*  --  3.8   CHLORIDE  --   --  96*  --  98   CO2  --   --  24  --  23   BUN  --   --  23.6*  --  21.2   CR  --   --  1.25*  --  1.27*   GFRESTIMATED  --   --  64  --  62   ANIONGAP  --   --  14  --  14   LES  --   --  9.1  --  9.5   *  --  280*  --  121*   ALBUMIN  --   --  4.1 4.2  --    PROTTOTAL  --   --  7.4 7.2  --    BILITOTAL  --   --  0.7 1.8*  --    ALKPHOS  --   --  110 110  --    ALT  --   --  25 17  --    AST  --   --  34 26  --    LIPASE  --   --  52 127*  --      Recent Labs   Lab Test 12/28/23  0653 09/09/21  0926   CHOL 103 124   HDL 28* 41   LDL 51 60   TRIG 121 113     Recent Labs   Lab 12/28/23  0123 12/25/23  1424   WBC 8.5 13.6*   HGB 16.3 16.9   HCT 46.8 48.8   MCV 87 88    246     No results for input(s): \"PH\", \"PHV\", \"PO2\", \"PO2V\", \"SAT\", \"PCO2\", \"PCO2V\", \"HCO3\", \"HCO3V\" in the last 168 hours.  No results for input(s): \"NTBNPI\", \"NTBNP\" in the last 168 hours.  Recent Labs   Lab 12/25/23  1424   DD 1.12*     No results for input(s): \"SED\", \"CRP\" in the last 168 hours.  Recent Labs   Lab 12/28/23 0123 12/25/23  1424    246     Recent Labs   Lab 12/28/23 0123   TSH 7.50*     No results for input(s): \"COLOR\", \"APPEARANCE\", \"URINEGLC\", \"URINEBILI\", \"URINEKETONE\", \"SG\", \"UBLD\", \"URINEPH\", \"PROTEIN\", \"UROBILINOGEN\", \"NITRITE\", \"LEUKEST\", \"RBCU\", \"WBCU\" in the last 168 hours.    Imaging:  Recent Results (from the past 48 hour(s))   CT Chest (PE) Abdomen Pelvis w Contrast    Narrative    EXAM: CT CHEST PE ABDOMEN PELVIS W CONTRAST  LOCATION: [facility]  DATE: 12/28/2023    INDICATION: sob, tachycardia, pancreatitis  COMPARISON: CT chest " 12/25/2023  TECHNIQUE: CT chest pulmonary angiogram and routine CT abdomen pelvis with IV contrast. Arterial phase through the chest and venous phase through the abdomen and pelvis. Multiplanar reformats and MIP reconstructions were performed. Dose reduction   techniques were used.   CONTRAST: 112 mL Isovue   370    FINDINGS:  ANGIOGRAM CHEST: Pulmonary arteries are normal caliber and negative for pulmonary emboli. Thoracic aorta is negative for dissection. No CT evidence of right heart strain.     LUNGS AND PLEURA: Mild left basilar atelectasis. Lungs otherwise clear. No pleural effusions. No pneumothorax.    MEDIASTINUM/AXILLAE: No lymphadenopathy. No thoracic aortic aneurysms. No pericardial effusion. Left chest wall implanted cardiac device. Small hiatal hernia.    CORONARY ARTERY CALCIFICATION: Mild.    HEPATOBILIARY: No significant mass or bile duct dilatation. No calcified gallstones.     PANCREAS: Fat stranding about the pancreatic tail. No organized fluid collection. No pancreatic duct dilation.    SPLEEN: Normal.    ADRENAL GLANDS: Normal.    KIDNEYS/BLADDER: Normal.    BOWEL: No obstruction or inflammatory change. Normal appendix.    LYMPH NODES: Normal.    VASCULATURE: Unremarkable.    PELVIC ORGANS: Normal.    MUSCULOSKELETAL: Small periumbilical fat-containing hernia. Mild degenerative changes of the spine.      Impression    IMPRESSION:  1.  No pulmonary embolism. No acute process in the chest.  2.  Acute pancreatitis.       Echo:  No results found for this or any previous visit (from the past 4320 hour(s)).    Clinically Significant Risk Factors Present on Admission        # Hypokalemia: Lowest K = 2.9 mmol/L in last 2 days, will replace as needed         # Drug Induced Platelet Defect: home medication list includes an antiplatelet medication   # Hypertension: Noted on problem list                Cardiac Arrhythmia: Atrial fibrillation: Paroxysmal

## 2023-12-28 NOTE — CONSULTS
Patient has Medicare D through [email or web address].    Eliquis/Xarelto: Discharge Pharmacy can provide 1 mo free.  Price at other pharmacies unknown.    2024 coverage:  Xarelto/Eliquis  --Patient will pay 100% of the first $280 in drugs costs (first month will be as much as $345)  --Subsequent fills will be $132/mo.  --When/if total drug costs exceed $5,030, cost will increase to a 25% coinsurance, equivalent to $138/mo.    Beginning April 1, the  of Xarelto offers a mail order program that provides Xarelto for $85/mo (or $240 for 3 mo), regardless of income.  Information can be found at [employer] or by calling [phone].    Izabella Mart  Pharmacy Technician/Liaison, Discharge Pharmacy   [phone] (voice or text)  [email or web address]

## 2023-12-28 NOTE — Clinical Note
The first balloon was inserted into the right coronary artery.Max pressure = 10 ángel. Total duration = 30 seconds.     Max pressure = 14 ángel. Total duration = 30 seconds.    Balloon reinflated a second time: Max pressure = 14 ángel. Total duration = 30 seconds.

## 2023-12-28 NOTE — PHARMACY-ADMISSION MEDICATION HISTORY
Pharmacy Intern Admission Medication History    Admission medication history is complete. The information provided in this note is only as accurate as the sources available at the time of the update.    Information Source(s): Patient, Clinic records, and [facility]    Pertinent Information: Patient declined interview and would only state that he took his medications yesterdays at 0900. Med list was obtained through SureScript[employer] and EHR.    Changes made to PTA medication list:  Added: dapagliflozin  Deleted: None  Changed: None    Medication Affordability:  Not including over the counter (OTC) medications, was there a time in the past 3 months when you did not take your medications as prescribed because of cost?: Unable to Assess    Allergies reviewed with patient and updates made in EHR: unable to assess    Medication History Completed By: Reena Fatima 12/28/2023 8:20 AM    PTA Med List   Medication Sig Last Dose    ASPIRIN PO Take 81 mg by mouth daily 12/27/2023 at 0900    Cholecalciferol (VITAMIN D PO) Take 1,000 mg by mouth daily  12/27/2023 at 0900    dapagliflozin (FARXIGA) 10 MG TABS tablet Take 10 mg by mouth daily 12/27/2023 at 0900    hydrochlorothiazide (HYDRODIURIL) 25 MG tablet Take 1 tablet (25 mg) by mouth daily 12/27/2023 at 0900    losartan (COZAAR) 100 MG tablet Take 1 tablet (100 mg) by mouth daily 12/27/2023 at 0900    metoprolol succinate ER (TOPROL XL) 50 MG 24 hr tablet Take 1 tablet (50 mg) by mouth daily 12/27/2023 at 0900    Multiple Vitamin (MULTI-VITAMIN PO) Take by mouth daily  12/27/2023 at 0900    omeprazole (PRILOSEC) 20 MG DR capsule TAKE 1 CAPSULE BY MOUTH EVERY DAY 12/27/2023 at 0900    rosuvastatin (CRESTOR) 40 MG tablet Take 1 tablet (40 mg) by mouth daily 12/27/2023 at 0900

## 2023-12-28 NOTE — ED TRIAGE NOTES
Patient states his apple watch says he is in afib. He does not have a history. Took 325mg of aspirin prior to arrival. He was in the ED recently with pancreatitis.      Triage Assessment (Adult)       Row Name 12/28/23 0100          Triage Assessment    Airway WDL WDL        Respiratory WDL    Respiratory WDL WDL        Cardiac WDL    Cardiac WDL X;chest pain        Peripheral/Neurovascular WDL    Peripheral Neurovascular WDL WDL        Cognitive/Neuro/Behavioral WDL    Cognitive/Neuro/Behavioral WDL WDL

## 2023-12-28 NOTE — ED PROVIDER NOTES
History     Chief Complaint:  Palpitations       HPI   Manan Bryant is a 68 year old male with history of coronary artery disease, hyperlipidemia and hyperlipidemia who presents to the ED for evaluation of palpitations. He reports being in the ED 3 days ago and was recently diagnosed with pancreatitis. He reports around 2230 experiencing chest tightness across his chest and increased heart beat. He has never had AFIB before. He notes feeling sensations down his arms. He reports not being able to take deep breaths and would cough right after. Denies abdominal pain.     Independent Historian:    None - Patient only     Medications:    ASPIRIN PO  Cholecalciferol (VITAMIN D PO)  hydrochlorothiazide (HYDRODIURIL) 25 MG tablet  losartan (COZAAR) 100 MG tablet  metoprolol succinate ER (TOPROL XL) 50 MG 24 hr tablet  Multiple Vitamin (MULTI-VITAMIN PO)  omeprazole (PRILOSEC) 20 MG DR capsule  rosuvastatin (CRESTOR) 40 MG tablet    Past Medical History:    CAD (coronary artery disease)  Essential hypertension  Acute anterior wall MI  Colonic polyps  Mixed hyperlipidemia  Myocardial infarction   Paroxysmal supraventricular tachycardia   PVC (premature ventricular contraction)  SVT (supraventricular tachycardia)   Syncope  Prediabetes  Chronic kidney disease stage 2    Past Surgical History:    Colonoscopy   Stent, Coronary, EMEKA   Cardiac surgery  Vasectomy    Physical Exam   Patient Vitals for the past 24 hrs:   BP Temp Temp src Pulse Resp SpO2   12/28/23 0533 107/59 -- -- (!) 123 18 97 %   12/28/23 0503 103/76 -- -- (!) 126 17 94 %   12/28/23 0357 -- -- -- 106 23 95 %   12/28/23 0355 113/75 -- -- -- -- --   12/28/23 0325 106/73 -- -- 112 24 95 %   12/28/23 0255 135/88 -- -- (!) 146 25 95 %   12/28/23 0225 -- 97.7  F (36.5  C) Oral (!) 144 23 --   12/28/23 0224 133/74 -- -- (!) 121 -- --   12/28/23 0157 112/80 -- -- (!) 131 18 94 %   12/28/23 0142 108/73 -- -- (!) 156 25 94 %   12/28/23 0138 91/77 -- -- (!) 141 13 95  %   12/28/23 0131 -- -- -- (!) 134 28 95 %   12/28/23 0116 117/76 -- -- (!) 141 18 --   12/28/23 0101 129/88 97  F (36.1  C) Temporal 84 18 96 %      Physical Exam  General: Resting on the gurney, appears mild uncomfortable  Head:  The scalp, face, and head appear normal  Mouth/Throat: Mucus membranes are moist  CV:  Heart rapid irregularly regular rate    Normal S1 and S2  No pathological murmur   Resp:  Breath sounds clear and equal bilaterally    Non-labored, no retractions or accessory muscle use    No coarseness    No wheezing   GI:  Abdomen is soft, no rigidity    Upper abdomen tender to palpation. No guarding or rebound.   MS:  Normal motor assessment of all extremities.    Good capillary refill noted.  Skin:  No rash or lesions noted.  Neuro:   Speech is normal and fluent. No apparent deficit.  Psych: Awake. Alert.  Normal affect.      Appropriate interactions.    Emergency Department Course   ECG  ECG results from 12/28/23   EKG 12 lead     Value    Systolic Blood Pressure     Diastolic Blood Pressure     Ventricular Rate 154    Atrial Rate     MI Interval     QRS Duration 90        QTc 480    P Axis     R AXIS 19    T Axis -68    Interpretation ECG      ** Critical Test Result: High HR  Atrial fibrillation with rapid ventricular response  ST & T wave abnormality, consider inferior ischemia  Abnormal ECG       Imaging:  CT Chest (PE) Abdomen Pelvis w Contrast   Final Result   IMPRESSION:   1.  No pulmonary embolism. No acute process in the chest.   2.  Acute pancreatitis.        Report per radiology    Laboratory:  Labs Ordered and Resulted from Time of ED Arrival to Time of ED Departure   COMPREHENSIVE METABOLIC PANEL - Abnormal       Result Value    Sodium 134 (*)     Potassium 2.9 (*)     Carbon Dioxide (CO2) 24      Anion Gap 14      Urea Nitrogen 23.6 (*)     Creatinine 1.25 (*)     GFR Estimate 64      Calcium 9.1      Chloride 96 (*)     Glucose 280 (*)     Alkaline Phosphatase 110      AST 34       ALT 25      Protein Total 7.4      Albumin 4.1      Bilirubin Total 0.7     TSH WITH FREE T4 REFLEX - Abnormal    TSH 7.50 (*)    TROPONIN T, HIGH SENSITIVITY - Normal    Troponin T, High Sensitivity 13     T4 FREE - Normal    Free T4 1.69     LIPASE - Normal    Lipase 52     CBC WITH PLATELETS AND DIFFERENTIAL    WBC Count 8.5      RBC Count 5.37      Hemoglobin 16.3      Hematocrit 46.8      MCV 87      MCH 30.4      MCHC 34.8      RDW 12.4      Platelet Count 250      % Neutrophils 62      % Lymphocytes 25      % Monocytes 9      % Eosinophils 3      % Basophils 1      % Immature Granulocytes 0      NRBCs per 100 WBC 0      Absolute Neutrophils 5.4      Absolute Lymphocytes 2.1      Absolute Monocytes 0.8      Absolute Eosinophils 0.3      Absolute Basophils 0.0      Absolute Immature Granulocytes 0.0      Absolute NRBCs 0.0        Procedures   None    Emergency Department Course & Assessments:       Interventions:  Medications   sodium chloride 0.9% BOLUS 1,000 mL (0 mLs Intravenous Stopped 12/28/23 0235)   sodium chloride 0.9% BOLUS 1,000 mL (0 mLs Intravenous Stopped 12/28/23 0359)   potassium chloride ER (KLOR-CON M) CR tablet 40 mEq (40 mEq Oral $Given 12/28/23 0233)   iopamidol (ISOVUE-370) solution 112 mL (112 mLs Intravenous $Given 12/28/23 0238)   Saline Flush (100 mLs Intravenous $Given 12/28/23 0238)      Independent Interpretation (X-rays, CTs, rhythm strip):  None    Assessments/Consultations/Discussion of Management or Tests:  ED Course as of 12/28/23 0539   Thu Dec 28, 2023   0216 I obtained history and examined the patient as noted above.    Patient discussed with the hospitalist service  Plan for admission discussed with the patient    Social Determinants of Health affecting care:  None     Disposition:  The patient was admitted to the hospital under the care of Dr. Whitney.     Impression & Plan      Medical Decision Making:  Manan Bryant is a 68 year old male who presents for evaluation  of palpitations.  This is consistent with atrial fibrillation flutter with rapid ventricular response. Patient with normal blood pressure and does not appear hemodynamically compromised.  PE considered but unlikely based on CT chest.  I doubt acute coronary syndrome, thyroid issues, PE, dissection, drug ingestion, acute electrolyte imbalance, etc. His new elevated heart rate is likely related to his pancreatitis and decreased oral intake.  His heart rate did improve significantly with IV fluids.  Additionally his potassium was quite low and this was replaced in the emergency department.  He has clearly failed outpatient management of his pancreatitis and will be admitted for inpatient management and care both of his pancreatitis and his new onset A-fib.  Will admit to telemetry for further cares.      Diagnosis:    ICD-10-CM    1. Acute pancreatitis, unspecified complication status, unspecified pancreatitis type  K85.90       2. Hypokalemia  E87.6       3. Atrial fibrillation with rapid ventricular response (H)  I48.91              Scribe Disclosure:  Radha CASTILLO, am serving as a scribe at 2:48 AM on 12/28/2023 to document services personally performed by Darcy Carroll MD based on my observations and the provider's statements to me.  12/28/2023   Darcy Carroll MD Taxman, Karah M, MD  12/28/23 0702

## 2023-12-28 NOTE — ED NOTES
MD updated on change in patient condition, will be down to evaluate. Will hold metoprolol at this time.

## 2023-12-29 LAB
ACT BLD: 313 SECONDS (ref 74–150)
ANION GAP SERPL CALCULATED.3IONS-SCNC: 6 MMOL/L (ref 7–15)
BUN SERPL-MCNC: 15.3 MG/DL (ref 8–23)
CALCIUM SERPL-MCNC: 8.4 MG/DL (ref 8.8–10.2)
CHLORIDE SERPL-SCNC: 108 MMOL/L (ref 98–107)
CREAT SERPL-MCNC: 1.06 MG/DL (ref 0.67–1.17)
DEPRECATED HCO3 PLAS-SCNC: 25 MMOL/L (ref 22–29)
EGFRCR SERPLBLD CKD-EPI 2021: 77 ML/MIN/1.73M2
ERYTHROCYTE [DISTWIDTH] IN BLOOD BY AUTOMATED COUNT: 12.6 % (ref 10–15)
GLUCOSE BLDC GLUCOMTR-MCNC: 146 MG/DL (ref 70–99)
GLUCOSE BLDC GLUCOMTR-MCNC: 79 MG/DL (ref 70–99)
GLUCOSE BLDC GLUCOMTR-MCNC: 84 MG/DL (ref 70–99)
GLUCOSE BLDC GLUCOMTR-MCNC: 90 MG/DL (ref 70–99)
GLUCOSE BLDC GLUCOMTR-MCNC: 91 MG/DL (ref 70–99)
GLUCOSE SERPL-MCNC: 102 MG/DL (ref 70–99)
HCT VFR BLD AUTO: 38.4 % (ref 40–53)
HGB BLD-MCNC: 13.1 G/DL (ref 13.3–17.7)
MAGNESIUM SERPL-MCNC: 2 MG/DL (ref 1.7–2.3)
MCH RBC QN AUTO: 30.5 PG (ref 26.5–33)
MCHC RBC AUTO-ENTMCNC: 34.1 G/DL (ref 31.5–36.5)
MCV RBC AUTO: 89 FL (ref 78–100)
PLATELET # BLD AUTO: 192 10E3/UL (ref 150–450)
POTASSIUM SERPL-SCNC: 3.9 MMOL/L (ref 3.4–5.3)
RBC # BLD AUTO: 4.3 10E6/UL (ref 4.4–5.9)
SODIUM SERPL-SCNC: 139 MMOL/L (ref 135–145)
UFH PPP CHRO-ACNC: 0.19 IU/ML
UFH PPP CHRO-ACNC: 0.51 IU/ML
WBC # BLD AUTO: 6.3 10E3/UL (ref 4–11)

## 2023-12-29 PROCEDURE — 93005 ELECTROCARDIOGRAM TRACING: CPT

## 2023-12-29 PROCEDURE — 85520 HEPARIN ASSAY: CPT | Performed by: INTERNAL MEDICINE

## 2023-12-29 PROCEDURE — B2111ZZ FLUOROSCOPY OF MULTIPLE CORONARY ARTERIES USING LOW OSMOLAR CONTRAST: ICD-10-PCS | Performed by: INTERNAL MEDICINE

## 2023-12-29 PROCEDURE — 210N000002 HC R&B HEART CARE

## 2023-12-29 PROCEDURE — 250N000012 HC RX MED GY IP 250 OP 636 PS 637: Performed by: INTERNAL MEDICINE

## 2023-12-29 PROCEDURE — C1874 STENT, COATED/COV W/DEL SYS: HCPCS | Performed by: INTERNAL MEDICINE

## 2023-12-29 PROCEDURE — C9600 PERC DRUG-EL COR STENT SING: HCPCS | Performed by: INTERNAL MEDICINE

## 2023-12-29 PROCEDURE — 99153 MOD SED SAME PHYS/QHP EA: CPT | Performed by: INTERNAL MEDICINE

## 2023-12-29 PROCEDURE — 99152 MOD SED SAME PHYS/QHP 5/>YRS: CPT | Performed by: INTERNAL MEDICINE

## 2023-12-29 PROCEDURE — 80048 BASIC METABOLIC PNL TOTAL CA: CPT | Performed by: INTERNAL MEDICINE

## 2023-12-29 PROCEDURE — 36415 COLL VENOUS BLD VENIPUNCTURE: CPT | Performed by: INTERNAL MEDICINE

## 2023-12-29 PROCEDURE — C1887 CATHETER, GUIDING: HCPCS | Performed by: INTERNAL MEDICINE

## 2023-12-29 PROCEDURE — C1760 CLOSURE DEV, VASC: HCPCS | Performed by: INTERNAL MEDICINE

## 2023-12-29 PROCEDURE — 85027 COMPLETE CBC AUTOMATED: CPT | Performed by: INTERNAL MEDICINE

## 2023-12-29 PROCEDURE — 250N000011 HC RX IP 250 OP 636: Performed by: INTERNAL MEDICINE

## 2023-12-29 PROCEDURE — 272N000001 HC OR GENERAL SUPPLY STERILE: Performed by: INTERNAL MEDICINE

## 2023-12-29 PROCEDURE — 83735 ASSAY OF MAGNESIUM: CPT | Performed by: INTERNAL MEDICINE

## 2023-12-29 PROCEDURE — C1769 GUIDE WIRE: HCPCS | Performed by: INTERNAL MEDICINE

## 2023-12-29 PROCEDURE — C1725 CATH, TRANSLUMIN NON-LASER: HCPCS | Performed by: INTERNAL MEDICINE

## 2023-12-29 PROCEDURE — 250N000009 HC RX 250: Performed by: INTERNAL MEDICINE

## 2023-12-29 PROCEDURE — 93454 CORONARY ARTERY ANGIO S&I: CPT | Performed by: INTERNAL MEDICINE

## 2023-12-29 PROCEDURE — 99233 SBSQ HOSP IP/OBS HIGH 50: CPT | Mod: 25 | Performed by: PHYSICIAN ASSISTANT

## 2023-12-29 PROCEDURE — 99233 SBSQ HOSP IP/OBS HIGH 50: CPT | Performed by: INTERNAL MEDICINE

## 2023-12-29 PROCEDURE — 250N000013 HC RX MED GY IP 250 OP 250 PS 637: Performed by: INTERNAL MEDICINE

## 2023-12-29 PROCEDURE — 92928 PRQ TCAT PLMT NTRAC ST 1 LES: CPT | Mod: RC | Performed by: INTERNAL MEDICINE

## 2023-12-29 PROCEDURE — 027034Z DILATION OF CORONARY ARTERY, ONE ARTERY WITH DRUG-ELUTING INTRALUMINAL DEVICE, PERCUTANEOUS APPROACH: ICD-10-PCS | Performed by: INTERNAL MEDICINE

## 2023-12-29 PROCEDURE — 93454 CORONARY ARTERY ANGIO S&I: CPT | Mod: 26 | Performed by: INTERNAL MEDICINE

## 2023-12-29 PROCEDURE — 85347 COAGULATION TIME ACTIVATED: CPT

## 2023-12-29 DEVICE — STENT CORONARY SYNERGY XD MR US 3.5X48MM H7493941848350: Type: IMPLANTABLE DEVICE | Status: FUNCTIONAL

## 2023-12-29 DEVICE — CLOSURE ANGIOSEAL 6FR 610130: Type: IMPLANTABLE DEVICE | Status: FUNCTIONAL

## 2023-12-29 RX ORDER — HEPARIN SODIUM 1000 [USP'U]/ML
INJECTION, SOLUTION INTRAVENOUS; SUBCUTANEOUS
Status: DISCONTINUED | OUTPATIENT
Start: 2023-12-29 | End: 2023-12-29 | Stop reason: HOSPADM

## 2023-12-29 RX ORDER — OXYCODONE HYDROCHLORIDE 5 MG/1
5 TABLET ORAL EVERY 4 HOURS PRN
Status: DISCONTINUED | OUTPATIENT
Start: 2023-12-29 | End: 2023-12-30 | Stop reason: HOSPADM

## 2023-12-29 RX ORDER — METOPROLOL TARTRATE 1 MG/ML
5 INJECTION, SOLUTION INTRAVENOUS
Status: DISCONTINUED | OUTPATIENT
Start: 2023-12-29 | End: 2023-12-30 | Stop reason: HOSPADM

## 2023-12-29 RX ORDER — NALOXONE HYDROCHLORIDE 0.4 MG/ML
0.4 INJECTION, SOLUTION INTRAMUSCULAR; INTRAVENOUS; SUBCUTANEOUS
Status: ACTIVE | OUTPATIENT
Start: 2023-12-29 | End: 2023-12-29

## 2023-12-29 RX ORDER — ATROPINE SULFATE 0.1 MG/ML
0.5 INJECTION INTRAVENOUS
Status: ACTIVE | OUTPATIENT
Start: 2023-12-29 | End: 2023-12-29

## 2023-12-29 RX ORDER — POTASSIUM CHLORIDE 1500 MG/1
20 TABLET, EXTENDED RELEASE ORAL
Status: COMPLETED | OUTPATIENT
Start: 2023-12-29 | End: 2023-12-29

## 2023-12-29 RX ORDER — ASPIRIN 81 MG/1
243 TABLET, CHEWABLE ORAL ONCE
Status: COMPLETED | OUTPATIENT
Start: 2023-12-29 | End: 2023-12-29

## 2023-12-29 RX ORDER — IOPAMIDOL 755 MG/ML
INJECTION, SOLUTION INTRAVASCULAR
Status: DISCONTINUED | OUTPATIENT
Start: 2023-12-29 | End: 2023-12-29 | Stop reason: HOSPADM

## 2023-12-29 RX ORDER — FENTANYL CITRATE 50 UG/ML
25 INJECTION, SOLUTION INTRAMUSCULAR; INTRAVENOUS
Status: DISCONTINUED | OUTPATIENT
Start: 2023-12-29 | End: 2023-12-30 | Stop reason: HOSPADM

## 2023-12-29 RX ORDER — LORAZEPAM 2 MG/ML
0.5 INJECTION INTRAMUSCULAR
Status: DISCONTINUED | OUTPATIENT
Start: 2023-12-29 | End: 2023-12-29

## 2023-12-29 RX ORDER — ASPIRIN 81 MG/1
81 TABLET, CHEWABLE ORAL ONCE
Status: DISCONTINUED | OUTPATIENT
Start: 2023-12-29 | End: 2023-12-29

## 2023-12-29 RX ORDER — FENTANYL CITRATE 50 UG/ML
INJECTION, SOLUTION INTRAMUSCULAR; INTRAVENOUS
Status: DISCONTINUED | OUTPATIENT
Start: 2023-12-29 | End: 2023-12-29 | Stop reason: HOSPADM

## 2023-12-29 RX ORDER — OXYCODONE HYDROCHLORIDE 5 MG/1
10 TABLET ORAL EVERY 4 HOURS PRN
Status: DISCONTINUED | OUTPATIENT
Start: 2023-12-29 | End: 2023-12-30 | Stop reason: HOSPADM

## 2023-12-29 RX ORDER — SODIUM CHLORIDE 9 MG/ML
INJECTION, SOLUTION INTRAVENOUS CONTINUOUS
Status: ACTIVE | OUTPATIENT
Start: 2023-12-29 | End: 2023-12-29

## 2023-12-29 RX ORDER — HYDRALAZINE HYDROCHLORIDE 20 MG/ML
10 INJECTION INTRAMUSCULAR; INTRAVENOUS EVERY 4 HOURS PRN
Status: DISCONTINUED | OUTPATIENT
Start: 2023-12-29 | End: 2023-12-30 | Stop reason: HOSPADM

## 2023-12-29 RX ORDER — NALOXONE HYDROCHLORIDE 0.4 MG/ML
0.2 INJECTION, SOLUTION INTRAMUSCULAR; INTRAVENOUS; SUBCUTANEOUS
Status: ACTIVE | OUTPATIENT
Start: 2023-12-29 | End: 2023-12-29

## 2023-12-29 RX ORDER — LIDOCAINE 40 MG/G
CREAM TOPICAL
Status: DISCONTINUED | OUTPATIENT
Start: 2023-12-29 | End: 2023-12-29

## 2023-12-29 RX ORDER — ONDANSETRON 2 MG/ML
4 INJECTION INTRAMUSCULAR; INTRAVENOUS EVERY 6 HOURS PRN
Status: DISCONTINUED | OUTPATIENT
Start: 2023-12-29 | End: 2023-12-30 | Stop reason: HOSPADM

## 2023-12-29 RX ORDER — ASPIRIN 81 MG/1
81 TABLET ORAL DAILY
Status: DISCONTINUED | OUTPATIENT
Start: 2023-12-30 | End: 2023-12-30

## 2023-12-29 RX ORDER — METOPROLOL SUCCINATE 100 MG/1
100 TABLET, EXTENDED RELEASE ORAL DAILY
Status: DISCONTINUED | OUTPATIENT
Start: 2023-12-30 | End: 2023-12-30

## 2023-12-29 RX ORDER — ONDANSETRON 4 MG/1
4 TABLET, ORALLY DISINTEGRATING ORAL EVERY 6 HOURS PRN
Status: DISCONTINUED | OUTPATIENT
Start: 2023-12-29 | End: 2023-12-30 | Stop reason: HOSPADM

## 2023-12-29 RX ORDER — LORAZEPAM 0.5 MG/1
0.5 TABLET ORAL
Status: DISCONTINUED | OUTPATIENT
Start: 2023-12-29 | End: 2023-12-29 | Stop reason: HOSPADM

## 2023-12-29 RX ORDER — ACETAMINOPHEN 325 MG/1
650 TABLET ORAL EVERY 4 HOURS PRN
Status: DISCONTINUED | OUTPATIENT
Start: 2023-12-29 | End: 2023-12-29

## 2023-12-29 RX ORDER — NITROGLYCERIN 0.4 MG/1
0.4 TABLET SUBLINGUAL EVERY 5 MIN PRN
Status: DISCONTINUED | OUTPATIENT
Start: 2023-12-29 | End: 2023-12-30 | Stop reason: HOSPADM

## 2023-12-29 RX ORDER — FLUMAZENIL 0.1 MG/ML
0.2 INJECTION, SOLUTION INTRAVENOUS
Status: ACTIVE | OUTPATIENT
Start: 2023-12-29 | End: 2023-12-29

## 2023-12-29 RX ORDER — ASPIRIN 81 MG/1
81 TABLET ORAL DAILY
Qty: 30 TABLET | Refills: 3 | Status: SHIPPED | OUTPATIENT
Start: 2023-12-30 | End: 2023-12-30

## 2023-12-29 RX ORDER — ASPIRIN 325 MG
325 TABLET ORAL ONCE
Status: COMPLETED | OUTPATIENT
Start: 2023-12-29 | End: 2023-12-29

## 2023-12-29 RX ADMIN — ROSUVASTATIN CALCIUM 40 MG: 20 TABLET, FILM COATED ORAL at 08:17

## 2023-12-29 RX ADMIN — HEPARIN SODIUM 1300 UNITS/HR: 10000 INJECTION, SOLUTION INTRAVENOUS at 01:58

## 2023-12-29 RX ADMIN — METOPROLOL SUCCINATE 50 MG: 50 TABLET, EXTENDED RELEASE ORAL at 08:17

## 2023-12-29 RX ADMIN — POTASSIUM CHLORIDE 20 MEQ: 1500 TABLET, EXTENDED RELEASE ORAL at 09:00

## 2023-12-29 RX ADMIN — MULTIPLE VITAMINS W/ MINERALS TAB 1 TABLET: TAB at 08:17

## 2023-12-29 RX ADMIN — POTASSIUM CHLORIDE AND SODIUM CHLORIDE: 900; 150 INJECTION, SOLUTION INTRAVENOUS at 03:12

## 2023-12-29 RX ADMIN — TICAGRELOR 90 MG: 90 TABLET ORAL at 22:17

## 2023-12-29 RX ADMIN — ASPIRIN 325 MG ORAL TABLET 325 MG: 325 PILL ORAL at 09:00

## 2023-12-29 RX ADMIN — OMEPRAZOLE 20 MG: 20 CAPSULE, DELAYED RELEASE ORAL at 08:17

## 2023-12-29 RX ADMIN — INSULIN ASPART 1 UNITS: 100 INJECTION, SOLUTION INTRAVENOUS; SUBCUTANEOUS at 19:15

## 2023-12-29 ASSESSMENT — ACTIVITIES OF DAILY LIVING (ADL)
ADLS_ACUITY_SCORE: 20
ADLS_ACUITY_SCORE: 22
ADLS_ACUITY_SCORE: 20
DEPENDENT_IADLS:: INDEPENDENT
ADLS_ACUITY_SCORE: 20
ADLS_ACUITY_SCORE: 20
ADLS_ACUITY_SCORE: 22
ADLS_ACUITY_SCORE: 22
ADLS_ACUITY_SCORE: 20

## 2023-12-29 NOTE — CONSULTS
Consult received for heart healthy diet teaching post angiogram. Pt just out of cath lab. RD will plan to see for diet teaching as schedule allows in the coming days.

## 2023-12-29 NOTE — PROGRESS NOTES
Orientations: Alert and oriented x 4  Vitals/Pain: Vital signs stable,room air.Denies pain.  Tele: Sinus Bradycardia  Diet: NPO Post MN  Lungs: Clear  Lines/Drains: R PIV infusing with Heparin drip x 1150 units/hr,NS + 20 KCL x 75 ml/hr  Skin/Wounds: WNL  GI/: No BM,Passing gas.Voiding adequately.  Labs: Abnormal/Trends, Electrolyte Replacement- K+,Mg,Hep Xa monitoring.Blood sugar pre meals and HS  Ambulation/Assist: Assist x 1, GB  Sleep Quality: Good  Plan: For Angiogram today.

## 2023-12-29 NOTE — PLAN OF CARE
Goal Outcome Evaluation:      Plan of Care Reviewed With: patient    Overall Patient Progress: improvingOverall Patient Progress: improving    Outcome Evaluation: [place] [facility] Denies pain. Remains on NS20K and heparin gtt. Plan for angio tomorrow.

## 2023-12-29 NOTE — PROGRESS NOTES
SW:  D: Care management consult acknowledged. Writer attempted to meet with patient at bedside however, he was soundly sleeping. Writer called patient's spouse, Art and left a VM asking for a call back to complete consult.    MINA Hoang, [facility]   Social Work   [facility]

## 2023-12-29 NOTE — PROGRESS NOTES
[facility]    Hospitalist Progress Note    Assessment & Plan   Date of Admission:  12/28/2023         Assessment & Plan  Manan Bryant is a 68 year old male with a history of CAD, HTN, HLD, CKD, SVT, PVCs who is admitted on 12/28/2023 with afib with RVR.      New onset atrial fibrillation with RVR  Frequent PVCs  Paroxysmal SVT  In review of the cardiology notes patient has had long standing issues with PVCs for which he has had an EP study, loop recorder and been on various beta blockers and was deemed high risk for progression to afib. Suspect that this current episode is precipitated by the pancreatitis with his underlying rhythm issues. [id number] is 3.   -given 2.5L in ED  - maint fluids started  - K 2 range and replaced to nl  - converted to NsR am 12/28.   - EKG without ischemic changes on NSR EKG 12/28.   - initated on PTA metoprolol.   - not need dilt gtt yet.   -Mg nl   - TSH mildly elevated  - no chf.   - no etoh   -Echo: nl valve dz, nl EF. No WMA, see below    Today. No further afib. Did well overnight.   K  and Mg wnl    Plan;  - follow K. Want K >4 range.   -continue PTA Toprol XL  -cardiology consulted and following  -tele  -DOAC followoing angiogram. Per pharm liaison, Xarrelto likely best option for patient.   - am bmp     Acute pancreatitis - improving  Unclear etiology   Initial diagnosis on 12/25, lipase and bili have since normalized. He is tolerate a diet without making his pain worse. Abd US on 12/25 negative for cholelithiasis. Calcium normal. Does not drink alcohol.   -check lipid panel--> .   - no eteh.   - nl abd US 12/25.    - no abd pain this am. Benign abd  -Lipase 52 now.   -benign abd  -seems to meet criteria as has abd pain, mildly elevated lipase and imaging c/w pancreaititis.     Plan;   - stop fluids  - [facility] consult regarding etiology , may need outpatient MRCP  -diet as tolerated post angiogram   -follow       CKD stage  2  Hypokalemia-resolved.   At his baseline creatinine is about 1.2-1.3. K 2.9 on presentation.   -hold hydrochlorothiazide   -K replaced to nl range  -nl bmp today   Plan;   -stop fluids   -potassium replacement protocol       CAD - ELLIOTT to LAD in 2010  Hypertension  Dyslipidemia  Troponin negative and stable over multiple checks on 12/25 and 12/28. Unlikely to be ischemia.   -tele as above for new afib  -continue PTA ASA, metoprolol,crestor  -hold losartan for now as low nl sbp and leave room for rate control agents if needed.   - tele  -EKG in NSR without ischemic changes  - Echo;   the visual ejection fraction is 55-60%. Mild LVH.  The right ventricle is normal in size and function.  There is no pericardial effusion.  -seen by cardiology   -pt has some mild cp on exam this am  - has had intermittent cp prior to admission.   - per cardiology, pt had cardiac MRI with stress demonstrating normal biventricular size with normal systolic function. Quantitative LVEF 63 %. Quantitative RVEF 57%. Regadenoson induced stress perfusion is negative for ischemia.   Delayed hyperenhancement reveals a small, subendocardial scar in the apex and apicoseptal portion of the left ventricle consistent with a small scar in the LAD distribution.   -no hx coronary angiogram.     -on crestor LDL 51, HDL 28.   -Angiogram today     Plan-  -heparin gtt for possibly ACS and afib  - asa  - angiogram today  - tele  - cont PTA crestor  - npo  -bmp am if stays         Mildly elevated TSH  TSH 7.5, free T4 normal at 1.69.   -follow up with PCP for recheck      Elevated glucose  Glucose 280 on presentation, normal to borderline elevated in the past. Hgb A1C of 6.1 in April 2023. Likely has pre-diabetes.   - Hgb A1C 6.4%.   - this am. Now 115.     -hold PTA Farxiga  - dm diet  - ISS with prandial dosing for now in hospital  - pcp follow up      Diet:  Regular  DVT Prophylaxis: Pneumatic Compression Devices  Code Status:  Full,      Disposition:  1-2 days pending cardiac work up     Clinically Significant Risk Factors Present on Admission          # Hypokalemia: Lowest K = 2.9 mmol/L in last 2 days, will replace as needed         # Drug Induced Platelet Defect: home medication list includes an antiplatelet medication      # Hypertension: Noted on problem list           Dispo: anticipate discharge home in 2 days once cardiac work up completed.   Pcp and cardiology follow up     Complex care, > 55 min on care coordination with rn and care coordinator, , exam X 1, charting, chart review. , angigram, gi consult,     Manan Dial MD, MD  Text Page  (7am to 6pm)  Interval History   Seen by cardiology  Stable night. No afib    -Data reviewed today: I reviewed all new labs and imaging results over the last 24 hours. I personally reviewed imaging and labs since admission     Physical Exam   Temp: 97.7  F (36.5  C) Temp src: Oral BP: 107/69 Pulse: 58   Resp: 18 SpO2: 97 % O2 Device: None (Room air)    Vitals:    12/28/23 0758   Weight: 97.5 kg (215 lb)     Vital Signs with Ranges  Temp:  [97.7  F (36.5  C)-98.6  F (37  C)] 97.7  F (36.5  C)  Pulse:  [55-62] 59  Resp:  [16-18] 18  BP: ()/(56-74) 120/74  SpO2:  [94 %-97 %] 96 %  I/O last 3 completed shifts:  In: 2862.19 [P.O.:1300; I.V.:1562.19]  Out: -     Constitutional: Nad, in bed  Neck: nl jvp  Respiratory: CTAB  Cardiovascular: RRR No r/g/m  GI: soft, nt, nd  Skin/Integumen: no rash or edema  Neuro: nl speech and mentation  Psych: nl affect       Medications    heparin 1,100 Units/hr (12/29/23 0904)    Reason anticoagulant not prescribed for atrial fibrillation      - MEDICATION INSTRUCTIONS -      Percutaneous Coronary Intervention orders placed (this is information for BPA alerting)        insulin aspart  1-7 Units Subcutaneous TID AC    insulin aspart  1-5 Units Subcutaneous At Bedtime    insulin aspart   Subcutaneous TID w/meals    [START ON 12/30/2023] metoprolol succinate ER  100 mg  Oral Daily    multivitamin w/minerals  1 tablet Oral Daily    omeprazole  20 mg Oral Daily    rosuvastatin  40 mg Oral Daily    sodium chloride (PF)  3 mL Intracatheter Q8H    sodium chloride (PF)  3 mL Intracatheter Q8H       Data   Recent Labs   Lab 12/29/23  0743 12/29/23  0632 12/29/23  0228 12/28/23  1220 12/28/23  0653 12/28/23  0123 12/25/23  1745 12/25/23  1424   WBC  --   --   --   --   --  8.5  --  13.6*   HGB  --   --   --   --   --  16.3  --  16.9   MCV  --   --   --   --   --  87  --  88   PLT  --   --   --   --   --  250  --  246   INR  --   --   --   --   --  1.03  --   --    NA  --  139  --   --   --  134*  --  135   POTASSIUM  --  3.9  --   --  3.8 2.9*  --  3.8   CHLORIDE  --  108*  --   --   --  96*  --  98   CO2  --  25  --   --   --  24  --  23   BUN  --  15.3  --   --   --  23.6*  --  21.2   CR  --  1.06  --   --   --  1.25*  --  1.27*   ANIONGAP  --  6*  --   --   --  14  --  14   LES  --  8.4*  --   --   --  9.1  --  9.5   GLC 84 102* 90   < >  --  280*  --  121*   ALBUMIN  --   --   --   --   --  4.1 4.2  --    PROTTOTAL  --   --   --   --   --  7.4 7.2  --    BILITOTAL  --   --   --   --   --  0.7 1.8*  --    ALKPHOS  --   --   --   --   --  110 110  --    ALT  --   --   --   --   --  25 17  --    AST  --   --   --   --   --  34 26  --    LIPASE  --   --   --   --   --  52 127*  --     < > = values in this interval not displayed.       Imaging:   No results found for this or any previous visit (from the past 24 hour(s)).

## 2023-12-29 NOTE — CONSULTS
[facility]  Gastroenterology Consultation    Manan Bryant  [address] [address]  68 year old male    Admission Date/Time: 12/28/2023  Primary Care Provider: Ric Villela    We were asked to see the patient in consultation by Dr. Dial for evaluation of possible pancreatitis.        HPI:  Manan Bryant is a 68 year old male who has a past medical history of CAD, hypertension, SVT, PVC, CKD who presents with tachycardia.  Patient was having cold symptoms of cough and chest pain which prompted a ED visit on 12/25 during which he was diagnosed with pancreatitis.  CT at that time was negative for PE.  Indistinction of the pancreatic tail with peripancreatic inflammatory fat stranding was noted compatible with an acute interstitial pancreatitis.  Ultrasound was normal.  Lipase and bili were mildly elevated.  COVID/flu/RSV negative.  He was discharged to home.  He has been able to eat some without worsening abdominal pain.  He then noted palpitations starting on 12/27.    Repeat CT C/A/P 11/28 with fat stranding about the pancreatic tail.  No organized fluid collection or pancreatic duct dilation.  No bile duct dilatation.  Normal liver function tests, total bilirubin 0.7.  Lipase 52.  Patient is no longer having abdominal or chest pain.    Calcium level normal.  Last TG in 11/2022 normal.  Both dapafliflozin and hydrochlorothiazide can be associated with pancreatitis.  No alcohol use.    ROS: A comprehensive ten point review of systems was negative aside from those in mentioned in the HPI.      MEDICATIONS: No current facility-administered medications on file prior to encounter.  ASPIRIN PO, Take 81 mg by mouth daily  Cholecalciferol (VITAMIN D PO), Take 1,000 mg by mouth daily   dapagliflozin (FARXIGA) 10 MG TABS tablet, Take 10 mg by mouth daily  hydrochlorothiazide (HYDRODIURIL) 25 MG tablet, Take 1 tablet (25 mg) by mouth daily  losartan (COZAAR) 100 MG tablet, Take 1  tablet (100 mg) by mouth daily  metoprolol succinate ER (TOPROL XL) 50 MG 24 hr tablet, Take 1 tablet (50 mg) by mouth daily  Multiple Vitamin (MULTI-VITAMIN PO), Take by mouth daily   omeprazole (PRILOSEC) 20 MG DR capsule, TAKE 1 CAPSULE BY MOUTH EVERY DAY  rosuvastatin (CRESTOR) 40 MG tablet, Take 1 tablet (40 mg) by mouth daily  nitroGLYcerin (NITROSTAT) 0.4 MG sublingual tablet, Place 1 tablet (0.4 mg) under the tongue every 5 minutes as needed for chest pain (Patient not taking: Reported on 1/3/2023)        ALLERGIES: No Known Allergies    Past Medical History:   Diagnosis Date    CAD (coronary artery disease)     cardiac cath 8/5/2010: ELLIOTT to LAD    Essential hypertension, benign     Family history of early CAD     History of acute anterior wall MI 2010 2010    History of colonic polyps     Mixed hyperlipidemia     Myocardial infarction (H)     Paroxysmal supraventricular tachycardia (H)     PVC (premature ventricular contraction)     Hx ventricular tachycardia during cardiac rehab 2010    SVT (supraventricular tachycardia) (H)     Syncope     episode 2016 - EP study - loop recorder implanted 2016 - non-functional after 3 years       Past Surgical History:   Procedure Laterality Date    COLONOSCOPY      STENT, CORONARY, EMEKA  8/2010    LAD         SOCIAL HISTORY:  Social History     Tobacco Use    Smoking status: Never    Smokeless tobacco: Never   Substance Use Topics    Alcohol use: No     Alcohol/week: 0.0 standard drinks of alcohol    Drug use: No       FAMILY HISTORY:  Family History   Problem Relation Age of Onset    Coronary Artery Disease Father     Myocardial Infarction Father     Coronary Artery Disease Brother     Myocardial Infarction Brother     Heart Surgery Brother         bypass    Coronary Artery Disease Paternal Grandfather     Myocardial Infarction Paternal Grandfather     Sleep Apnea Sister     Family History Negative Mother     Family History Negative Maternal Grandmother     Heart  Failure Maternal Grandfather     Family History Negative Paternal Grandmother     Myocardial Infarction Brother     Coronary Artery Disease Brother     Heart Surgery Brother         bypass    Coronary Artery Disease Brother     Obesity Sister     Obesity Brother     Obesity Brother        PHYSICAL EXAM:   /69 (BP Location: Left arm)   Pulse 58   Temp 97.7  F (36.5  C) (Oral)   Resp 18   Wt 97.5 kg (215 lb)   SpO2 97%   BMI 29.57 kg/m      Constitutional: NAD, comfortable  Cardiovascular: RRR, normal S1 and S2, no r/c/g/m  Respiratory: CTAB  Psychiatric: mentation appears normal and affect normal  Head: Normocephalic. Atraumatic.    Neck: Neck supple. No adenopathy. Thyroid symmetric, normal size, trachea midline  Eyes:  PERRL, no icterus  ENT: Hearing adequate, pharynx normal without erythema or exudate  Abdomen:   Auscultation: + bs  Appearance: non-distended  Palpation: non-tender  NEURO: grossly negative  SKIN: no suspicious lesions or rashes  LYMPH:   anterior cervical: no adenopathy  posterior cervical: no adenopathy  supraclavicular: no adenopathy          ADDITIONAL COMMENTS:   I reviewed the patient's new clinical lab test results.   Recent Labs   Lab Test 12/28/23  0123 12/25/23  1424 04/07/23  0741   WBC 8.5 13.6* 6.5   HGB 16.3 16.9 15.5   MCV 87 88 87    246 217   INR 1.03  --   --      Recent Labs   Lab Test 12/29/23  0743 12/29/23  0632 12/29/23  0228 12/28/23  1220 12/28/23  0653 12/28/23  0123 12/25/23  1424   NA  --  139  --   --   --  134* 135   POTASSIUM  --  3.9  --   --  3.8 2.9* 3.8   CHLORIDE  --  108*  --   --   --  96* 98   CO2  --  25  --   --   --  24 23   BUN  --  15.3  --   --   --  23.6* 21.2   CR  --  1.06  --   --   --  1.25* 1.27*   ANIONGAP  --  6*  --   --   --  14 14   LES  --  8.4*  --   --   --  9.1 9.5   GLC 84 102* 90   < >  --  280* 121*    < > = values in this interval not displayed.     Recent Labs   Lab Test 12/28/23  0123 12/25/23  1745 04/07/23  0752  06/29/16  0827 06/21/16  0103   ALBUMIN 4.1 4.2 4.6   < >  --    BILITOTAL 0.7 1.8* 0.6   < >  --    DBIL  --  0.23  --   --   --    ALT 25 17 29   < >  --    AST 34 26 38   < >  --    ALKPHOS 110 110 109   < >  --    PROTEIN  --   --   --   --  Negative   LIPASE 52 127*  --   --   --     < > = values in this interval not displayed.             .    CONSULTATION ASSESSMENT AND PLAN:      69 yo male who presented on Sebas day with chest pain and was diagnosed with acute pancreatitis with CT showing inflammation at the pancreatic tail and lipase 127 (not 3 x ULN).  Pain resolved at home and patient eating without difficulty.  Developed tachycardia and presented 12/27 with new onset atrial fibrillation with RVR.  Imaging again with inflammation in the tail of the pancreas, no elevation of lipase, patient currently pain free.  He does not meet criteria for acute pancreatitis.  No obvious risk factors.  Patient does not drink alcohol.  Normal liver function tests, ultrasound without stones or ductal dilatation.  Calcium and TG normal.  No suspicious medications.    -  MRCP in 2 weeks.  [facility] will order.  Will have patient follow up in pancreas clinic.  -  Diet as tolerated.    Total Time Spent: 40 minutes        I discussed the patient's findings and plan with Dr. Garcia.          Jie Liang, PAC  [facility]  Cell:  [phone], not available after 11:30AM at this number

## 2023-12-29 NOTE — PRE-PROCEDURE
GENERAL PRE-PROCEDURE:   Procedure:  Coronary angiogram, possible intervention, LVG  Date/Time:  12/29/2023 1:59 PM    Verbal consent obtained?: Yes    Risks and benefits: Risks, benefits and alternatives were discussed    Consent given by:  Patient  Patient states understanding of procedure being performed: Yes    Patient's understanding of procedure matches consent: Yes    Procedure consent matches procedure scheduled: Yes    Expected level of sedation:  Moderate  Appropriately NPO:  Yes  ASA Class:  1  Mallampati  :  Grade 2- soft palate, base of uvula, tonsillar pillars, and portion of posterior pharyngeal wall visible  Lungs:  Lungs clear with good breath sounds bilaterally  Heart:  Normal heart sounds and rate  History & Physical reviewed:  History and physical reviewed and no updates needed  Statement of review:  I have reviewed the lab findings, diagnostic data, medications, and the plan for sedation

## 2023-12-29 NOTE — CONSULTS
Care Coordination    Care Coordinator currently following along.  Follow up appointment set up for Art 10 at 7:15AM    Olga Cavazos RN, BSN, Care Coordinator

## 2023-12-29 NOTE — PROGRESS NOTES
[facility]  Cardiology Progress Note  Date of Service: 12/29/2023  Primary Cardiologist: Dr. Corley/ Tristen Hammond PA-C     Assessment & Plan    Manan Bryant is a 68 year old male with past medical history significant for CAD, HTN, HLD, CKD, SVT, PVCs admitted on 12/28/2023 with palpitations, admitted recently diagnosed with pancreatitis.    Assessment:  1.  A-fib with RVR, new diagnosis, heart rate of 154 on admission, converted spontaneously now in sinus rhythm.  -[id number] is 3 for age, hypertension, and coronary artery disease.      2.  Elevated troponin, flat at 59 but T wave inversion without any A-fib.  History of coronary artery disease with drug-eluting stent to the mid LAD in 2010.  Given symptoms of chest pain and EKG changes plan for coronary angiogram today. Unclear if this is a type II demand infarct or type I.    3.  Acute pancreatitis, improving.  No additional GI symptoms    4.  Hypertension, well-controlled    5.  Hyperlipidemia on rosuvastatin 40 prior to admission, continued    6.  CKD with creatinine normal here at 1.06 historically has been as high as 1.3    Plan:   Angiogram today.  Risks and benefits of coronary angiogram discussed today including, bleeding, bruising, infection, allergic reaction, kidney damage (including need for dialysis), stroke, heart attack, vascular damage, emergency open heart surgery, up to and including death.  Patient indicates understanding and is agreeable to proceed.    Pharm consult for doac, currently on heparin gtt  Stopped all treatment.  Increase Toprol to 100 mg daily starting tomorrow.  Pending angiogram results will finalize antiplatelet/anticoagulation.  Will continue to follow with you    Magy Dick PA-C  [facility]  Pager: [phone]     Interval History   Patient feels well today.  No abdominal pain.  Continues to have a cough with deep breathing and this has been ongoing for him intermittently for years.  He has no  pain with deep breathing.  He denies chest pain.  He denies orthopnea or PND.  He was able to sleep flat.    Physical Exam   Temp: 97.7  F (36.5  C) Temp src: Oral BP: 107/69 Pulse: 58   Resp: 18 SpO2: 97 % O2 Device: None (Room air)    Vitals:    12/28/23 0758   Weight: 97.5 kg (215 lb)     Well-developed well-nourished fit appearing gentleman in no acute distress.  Normocephalic/atraumatic.  Heart is regular in rate and rhythm without murmur gallop.  Lungs are clear without wheezes rales or rhonchi.  2+ radial ulnar and femoral pulses bilaterally without bruit.    Clinically Significant Risk Factors        # Hypokalemia: Lowest K = 2.9 mmol/L in last 2 days, will replace as needed   # Hypocalcemia: Lowest Ca = 8.4 mg/dL in last 2 days, will monitor and replace as appropriate         # Hypertension: Noted on problem list                      Medications    0.9% sodium chloride + KCl 20 mEq/L 75 mL/hr at 12/29/23 0312    dilTIAZem Stopped (12/28/23 1034)    heparin Stopped (12/29/23 0813)    Reason anticoagulant not prescribed for atrial fibrillation      - MEDICATION INSTRUCTIONS -        aspirin  325 mg Oral Once    Or    aspirin  243 mg Oral Once    insulin aspart  1-7 Units Subcutaneous TID AC    insulin aspart  1-5 Units Subcutaneous At Bedtime    insulin aspart   Subcutaneous TID w/meals    metoprolol succinate ER  50 mg Oral Daily    multivitamin w/minerals  1 tablet Oral Daily    omeprazole  20 mg Oral Daily    rosuvastatin  40 mg Oral Daily    sodium chloride (PF)  3 mL Intracatheter Q8H    sodium chloride (PF)  3 mL Intracatheter Q8H       Data   Last 24 hours labs reviewed

## 2023-12-29 NOTE — CONSULTS
Care Management Initial Consult    General Information  Assessment completed with: Spouse or significant other, Art  Type of CM/SW Visit: Initial Assessment    Primary Care Provider verified and updated as needed: Yes   Readmission within the last 30 days: no previous admission in last 30 days      Reason for Consult: other (see comments) (Elevated Risk)  Advance Care Planning: Advance Care Planning Reviewed: other (see comments) (has document but does not know where it is located)          Communication Assessment  Patient's communication style: spoken language (English or Bilingual)    Hearing Difficulty or Deaf: no   Wear Glasses or Blind: yes    Cognitive  Cognitive/Neuro/Behavioral: WDL        Orientation: oriented x 4             Living Environment:   People in home: spouse  Daquan Cordova  Current living Arrangements: house      Able to return to prior arrangements: yes  Living Arrangement Comments: return home with family support    Family/Social Support:  Care provided by: self  Provides care for: no one  Marital Status:   Children, Wife  Art       Description of Support System: Supportive, Involved    Support Assessment: Adequate family and caregiver support, Adequate social supports    Current Resources:   Patient receiving home care services: No     Community Resources: None  Equipment currently used at home: none  Supplies currently used at home: None    Employment/Financial:  Employment Status: retired        Financial Concerns: none   Referral to Financial Worker: No  Finance Comments: Medicare    Does the patient's insurance plan have a 3 day qualifying hospital stay waiver?  No    Lifestyle & Psychosocial Needs:  Social Determinants of Health     Food Insecurity: Not on file   Depression: Not at risk (10/18/2022)    PHQ-2     PHQ-2 Score: 0   Housing Stability: Not on file   Tobacco Use: Low Risk  (1/3/2023)    Patient History     Smoking Tobacco Use: Never     Smokeless Tobacco Use: Never      "Passive Exposure: Not on file   Financial Resource Strain: Not on file   Alcohol Use: Not on file   Transportation Needs: Not on file   Physical Activity: Not on file   Interpersonal Safety: Not on file   Stress: Not on file   Social Connections: Not on file       Functional Status:  Prior to admission patient needed assistance:   Dependent ADLs:: Independent  Dependent IADLs:: Independent  Assesssment of Functional Status: At functional baseline    Mental Health Status:  Mental Health Status: No Current Concerns       Chemical Dependency Status:  Chemical Dependency Status: No Current Concerns             Values/Beliefs:  Spiritual, Cultural Beliefs, [employer]ices, Values that affect care: yes  Description of Beliefs that Will Affect Care: Caodaism            Additional Information:  Per care management consult for elevated risk, chart was reviewed. Patient is a pleasant 68 year old man that was admitted to [facility] on 12/28/23. According to the H & P \" patient has a past medical history of  CAD, HTN, HLD, CKD, SVT, PVCs who is admitted on 12/28/2023 with afib with RVR\". Writer attempted to meet with patient at bedside to complete elevated risk consult, however patient was sleeping. Writer called patient's spouse, Art ([phone]) to introduce self and role and complete initial consult. Art confirmed that she and patient reside at [address] [place] [address] in [facility]. Spouse reports bedroom and bathroom are on the second floor, however, if needed there is a bedroom/bath on the main level as well. At baseline patient is independent with ambulation, ADL's and IADL's and transportation.  Spouse reports she is also able to complete household tasks if needed (they share most tasks). Patient has support from his spouse and two children. Patient is retired since 2018 from working in [email or web address] with [employer]. Patient has a pension and Social Security and no financial concerns. Spouse confirmed patient's PCP. Spouse " denied any needs at this time.     MINA Hoang, LGSW   Social Work   [facility]

## 2023-12-30 ENCOUNTER — APPOINTMENT (OUTPATIENT)
Dept: PHYSICAL THERAPY | Facility: CLINIC | Age: 68
DRG: 321 | End: 2023-12-30
Attending: INTERNAL MEDICINE
Payer: MEDICARE

## 2023-12-30 ENCOUNTER — APPOINTMENT (OUTPATIENT)
Dept: CARDIOLOGY | Facility: CLINIC | Age: 68
DRG: 321 | End: 2023-12-30
Attending: INTERNAL MEDICINE
Payer: MEDICARE

## 2023-12-30 VITALS
BODY MASS INDEX: 30.31 KG/M2 | RESPIRATION RATE: 18 BRPM | HEART RATE: 68 BPM | DIASTOLIC BLOOD PRESSURE: 91 MMHG | OXYGEN SATURATION: 95 % | WEIGHT: 220.4 LBS | TEMPERATURE: 98.6 F | SYSTOLIC BLOOD PRESSURE: 145 MMHG

## 2023-12-30 LAB
ANION GAP SERPL CALCULATED.3IONS-SCNC: 10 MMOL/L (ref 7–15)
BUN SERPL-MCNC: 12.8 MG/DL (ref 8–23)
CALCIUM SERPL-MCNC: 8.5 MG/DL (ref 8.8–10.2)
CHLORIDE SERPL-SCNC: 109 MMOL/L (ref 98–107)
CREAT SERPL-MCNC: 1.01 MG/DL (ref 0.67–1.17)
DEPRECATED HCO3 PLAS-SCNC: 20 MMOL/L (ref 22–29)
EGFRCR SERPLBLD CKD-EPI 2021: 82 ML/MIN/1.73M2
GLUCOSE BLDC GLUCOMTR-MCNC: 92 MG/DL (ref 70–99)
GLUCOSE BLDC GLUCOMTR-MCNC: 93 MG/DL (ref 70–99)
GLUCOSE SERPL-MCNC: 89 MG/DL (ref 70–99)
MAGNESIUM SERPL-MCNC: 1.7 MG/DL (ref 1.7–2.3)
POTASSIUM SERPL-SCNC: 3.8 MMOL/L (ref 3.4–5.3)
SODIUM SERPL-SCNC: 139 MMOL/L (ref 135–145)

## 2023-12-30 PROCEDURE — 93005 ELECTROCARDIOGRAM TRACING: CPT

## 2023-12-30 PROCEDURE — 250N000013 HC RX MED GY IP 250 OP 250 PS 637: Performed by: INTERNAL MEDICINE

## 2023-12-30 PROCEDURE — 93244 EXT ECG>48HR<7D REV&INTERPJ: CPT | Performed by: INTERNAL MEDICINE

## 2023-12-30 PROCEDURE — 36415 COLL VENOUS BLD VENIPUNCTURE: CPT | Performed by: INTERNAL MEDICINE

## 2023-12-30 PROCEDURE — 80048 BASIC METABOLIC PNL TOTAL CA: CPT | Performed by: INTERNAL MEDICINE

## 2023-12-30 PROCEDURE — 93242 EXT ECG>48HR<7D RECORDING: CPT

## 2023-12-30 PROCEDURE — 83735 ASSAY OF MAGNESIUM: CPT | Performed by: INTERNAL MEDICINE

## 2023-12-30 PROCEDURE — 97161 PT EVAL LOW COMPLEX 20 MIN: CPT | Mod: GP

## 2023-12-30 PROCEDURE — 97530 THERAPEUTIC ACTIVITIES: CPT | Mod: GP

## 2023-12-30 PROCEDURE — 97110 THERAPEUTIC EXERCISES: CPT | Mod: GP

## 2023-12-30 PROCEDURE — 99232 SBSQ HOSP IP/OBS MODERATE 35: CPT | Performed by: INTERNAL MEDICINE

## 2023-12-30 PROCEDURE — 99239 HOSP IP/OBS DSCHRG MGMT >30: CPT | Performed by: INTERNAL MEDICINE

## 2023-12-30 RX ORDER — CLOPIDOGREL BISULFATE 75 MG/1
75 TABLET ORAL DAILY
Qty: 60 TABLET | Refills: 0 | Status: SHIPPED | OUTPATIENT
Start: 2023-12-31 | End: 2024-01-22

## 2023-12-30 RX ORDER — METOPROLOL TARTRATE 25 MG/1
25 TABLET, FILM COATED ORAL 2 TIMES DAILY PRN
Status: DISCONTINUED | OUTPATIENT
Start: 2023-12-30 | End: 2023-12-30 | Stop reason: HOSPADM

## 2023-12-30 RX ORDER — CLOPIDOGREL 300 MG/1
600 TABLET, FILM COATED ORAL ONCE
Status: COMPLETED | OUTPATIENT
Start: 2023-12-30 | End: 2023-12-30

## 2023-12-30 RX ORDER — CLOPIDOGREL BISULFATE 75 MG/1
75 TABLET ORAL DAILY
Status: DISCONTINUED | OUTPATIENT
Start: 2023-12-31 | End: 2023-12-30 | Stop reason: HOSPADM

## 2023-12-30 RX ORDER — PANTOPRAZOLE SODIUM 40 MG/1
40 TABLET, DELAYED RELEASE ORAL
Status: DISCONTINUED | OUTPATIENT
Start: 2023-12-30 | End: 2023-12-30 | Stop reason: HOSPADM

## 2023-12-30 RX ORDER — METOPROLOL SUCCINATE 50 MG/1
50 TABLET, EXTENDED RELEASE ORAL DAILY
Status: DISCONTINUED | OUTPATIENT
Start: 2023-12-30 | End: 2023-12-30 | Stop reason: HOSPADM

## 2023-12-30 RX ORDER — ASPIRIN 81 MG/1
81 TABLET, CHEWABLE ORAL ONCE
Status: COMPLETED | OUTPATIENT
Start: 2023-12-30 | End: 2023-12-30

## 2023-12-30 RX ORDER — METOPROLOL TARTRATE 25 MG/1
25 TABLET, FILM COATED ORAL 2 TIMES DAILY PRN
Qty: 30 TABLET | Refills: 0 | Status: SHIPPED | OUTPATIENT
Start: 2023-12-30 | End: 2024-02-15

## 2023-12-30 RX ORDER — POTASSIUM CHLORIDE 1500 MG/1
20 TABLET, EXTENDED RELEASE ORAL ONCE
Status: COMPLETED | OUTPATIENT
Start: 2023-12-30 | End: 2023-12-30

## 2023-12-30 RX ORDER — NITROGLYCERIN 0.4 MG/1
0.4 TABLET SUBLINGUAL EVERY 5 MIN PRN
Qty: 25 TABLET | Refills: 3 | Status: SHIPPED | OUTPATIENT
Start: 2023-12-30

## 2023-12-30 RX ORDER — PANTOPRAZOLE SODIUM 40 MG/1
40 TABLET, DELAYED RELEASE ORAL
Qty: 30 TABLET | Refills: 0 | Status: SHIPPED | OUTPATIENT
Start: 2023-12-30 | End: 2023-12-30

## 2023-12-30 RX ADMIN — PANTOPRAZOLE SODIUM 40 MG: 40 TABLET, DELAYED RELEASE ORAL at 09:40

## 2023-12-30 RX ADMIN — ASPIRIN 81 MG CHEWABLE TABLET 81 MG: 81 TABLET CHEWABLE at 09:39

## 2023-12-30 RX ADMIN — METOPROLOL SUCCINATE 50 MG: 50 TABLET, EXTENDED RELEASE ORAL at 09:40

## 2023-12-30 RX ADMIN — POTASSIUM CHLORIDE 20 MEQ: 1500 TABLET, EXTENDED RELEASE ORAL at 09:40

## 2023-12-30 RX ADMIN — MULTIPLE VITAMINS W/ MINERALS TAB 1 TABLET: TAB at 09:40

## 2023-12-30 RX ADMIN — ROSUVASTATIN CALCIUM 40 MG: 20 TABLET, FILM COATED ORAL at 09:40

## 2023-12-30 RX ADMIN — CLOPIDOGREL BISULFATE 600 MG: 300 TABLET, FILM COATED ORAL at 09:41

## 2023-12-30 ASSESSMENT — ACTIVITIES OF DAILY LIVING (ADL)
ADLS_ACUITY_SCORE: 20

## 2023-12-30 NOTE — PLAN OF CARE
A&OX4, hypertensive in the evening,BP improved overnight,  RA, denies pain, Tele- SB, up with SBA. BG 91 and 93, on regular diet.Right groin WNL, pedal pulse weak.

## 2023-12-30 NOTE — PROGRESS NOTES
[facility]    Cardiology Consultation - Progress Note     Date of Admission:  12/28/2023  Date of Service: 12/30/2023    Reason for Consult   Reason for consult: new onset atrial fibrillation    History of Present Illness   Manan Bryant is a 68 year old male who was admitted on 12/28/2023 for palpitations and found to have new onset atrial fibriallation. Medical comorbidities include coronary artery disease, supraventricular tachycardia, PVCs, hypertension, hyperlipidemia, and chronic kidney disease.    Assessment & Plan   1.  A-fib with RVR, new diagnosis, heart rate of 154 on admission, converted spontaneously now in sinus rhythm.  -[id number] is 3 for age, hypertension, and coronary artery disease.       2.  Elevated troponin, flat at 59 but T wave inversion without any A-fib.  History of coronary artery disease with drug-eluting stent to the mid LAD in 2010.    -Given symptoms of chest pain and EKG changes he underwent coronary angiography on 12/29   --Chronic  of prox to mid RCA, underwent successful PCI to   --mid LAD 30% stenosis, prior mid-distal PCI with patent stent    plan for coronary angiogram today. Unclear if this is a type II demand infarct or type I.     3.  Acute pancreatitis, improving.  No additional GI symptoms     4.  Hypertension, well-controlled     5.  Hyperlipidemia on rosuvastatin 40 prior to admission, continued     6.  CKD with creatinine normal here at 1.06 historically has been as high as 1.3    Plan:  Starting Xarelto 20 mg daily today  Last dose of aspirin 81 mg this morning, then discontinue  Switching ticagrelor to clopidogrel given concomitant use with oral anticoagulation  Continue metoprolol succinate 50 mg daily  Start metoprolol tartrate 25 mg tablets PRN for if he goes into Afib with rates <110 at rest (homegoing rate control strategy)  Can consider rhythm control strategy if difficulty with tachy-philippe  Continue rosuvastatin 40 mg  daily  Omeprazole transitioned to pantoprazole given clopidogrel use  Discharge with 7 day ZioPatch  OK to discharge today  Upon discharge reasonable to restart PTA Farxiga and losartan, would consider holding hydrochlorothiazide to monitor blood pressure response, but will defer to PCP and neprhology who had been managing his antihypertensives  Patient would like to see GI before leaving today  Cardiology follow-up in 2 weeks    Jalen Grove MD    Primary Care Physician   Ric Villela MD    Patient Active Problem List   Diagnosis    History of acute anterior wall MI    Coronary artery disease involving native coronary artery of native heart without angina pectoris    Hyperlipidemia LDL goal <70    PVC (premature ventricular contraction)    Family history of early CAD    SVT (supraventricular tachycardia)    NSVT (nonsustained ventricular tachycardia)    Paroxysmal supraventricular tachycardia    HDL deficiency    NAFLD (nonalcoholic fatty liver disease)    Esophageal stricture    Benign essential hypertension    Glucose intolerance (impaired glucose tolerance)    Class 1 obesity due to excess calories without serious comorbidity with body mass index (BMI) of 30.0 to 30.9 in adult    Hypokalemia    Atrial fibrillation with rapid ventricular response (H)    Acute pancreatitis, unspecified complication status, unspecified pancreatitis type       Past Medical History   I have reviewed this patient's medical history and updated it with pertinent information if needed.   Past Medical History:   Diagnosis Date    CAD (coronary artery disease)     cardiac cath 8/5/2010: ELLIOTT to LAD    Essential hypertension, benign     Family history of early CAD     History of acute anterior wall MI 2010 2010    History of colonic polyps     Mixed hyperlipidemia     Myocardial infarction (H)     Paroxysmal supraventricular tachycardia (H)     PVC (premature ventricular contraction)     Hx ventricular tachycardia  during cardiac rehab 2010    SVT (supraventricular tachycardia) (H)     Syncope     episode 2016 - EP study - loop recorder implanted 2016 - non-functional after 3 years       Past Surgical History   I have reviewed this patient's surgical history and updated it with pertinent information if needed.  Past Surgical History:   Procedure Laterality Date    COLONOSCOPY      STENT, CORONARY, EMEKA  8/2010    LAD       Prior to Admission Medications   Prior to Admission Medications   Prescriptions Last Dose Informant Patient Reported? Taking?   ASPIRIN PO 12/27/2023 at 0900  Yes Yes   Sig: Take 81 mg by mouth daily   Cholecalciferol (VITAMIN D PO) 12/27/2023 at 0900  Yes Yes   Sig: Take 1,000 mg by mouth daily    Multiple Vitamin (MULTI-VITAMIN PO) 12/27/2023 at 0900  Yes Yes   Sig: Take by mouth daily    dapagliflozin (FARXIGA) 10 MG TABS tablet 12/27/2023 at 0900  Yes Yes   Sig: Take 10 mg by mouth daily   hydrochlorothiazide (HYDRODIURIL) 25 MG tablet 12/27/2023 at 0900  No Yes   Sig: Take 1 tablet (25 mg) by mouth daily   losartan (COZAAR) 100 MG tablet 12/27/2023 at 0900  No Yes   Sig: Take 1 tablet (100 mg) by mouth daily   metoprolol succinate ER (TOPROL XL) 50 MG 24 hr tablet 12/27/2023 at 0900  No Yes   Sig: Take 1 tablet (50 mg) by mouth daily   nitroGLYcerin (NITROSTAT) 0.4 MG sublingual tablet   No No   Sig: Place 1 tablet (0.4 mg) under the tongue every 5 minutes as needed for chest pain   Patient not taking: Reported on 1/3/2023   omeprazole (PRILOSEC) 20 MG DR capsule 12/27/2023 at 0900  No Yes   Sig: TAKE 1 CAPSULE BY MOUTH EVERY DAY   rosuvastatin (CRESTOR) 40 MG tablet 12/27/2023 at 0900  No Yes   Sig: Take 1 tablet (40 mg) by mouth daily      Facility-Administered Medications: None     Current Facility-Administered Medications   Medication Dose Route Frequency    aspirin  81 mg Oral Daily    insulin aspart  1-7 Units Subcutaneous TID AC    insulin aspart  1-5 Units Subcutaneous At Bedtime    insulin  aspart   Subcutaneous TID w/meals    metoprolol succinate ER  100 mg Oral Daily    multivitamin w/minerals  1 tablet Oral Daily    omeprazole  20 mg Oral Daily    rosuvastatin  40 mg Oral Daily    sodium chloride (PF)  3 mL Intracatheter Q8H    ticagrelor  90 mg Oral BID     Current Facility-Administered Medications   Medication Last Rate    - MEDICATION INSTRUCTIONS -      - MEDICATION INSTRUCTIONS -      Reason anticoagulant not prescribed for atrial fibrillation      Percutaneous Coronary Intervention orders placed (this is information for BPA alerting)       Allergies   No Known Allergies    Social History    reports that he has never smoked. He has never used smokeless tobacco. He reports that he does not drink alcohol and does not use drugs.    Family History   Family History   Problem Relation Age of Onset    Coronary Artery Disease Father     Myocardial Infarction Father     Coronary Artery Disease Brother     Myocardial Infarction Brother     Heart Surgery Brother         bypass    Coronary Artery Disease Paternal Grandfather     Myocardial Infarction Paternal Grandfather     Sleep Apnea Sister     Family History Negative Mother     Family History Negative Maternal Grandmother     Heart Failure Maternal Grandfather     Family History Negative Paternal Grandmother     Myocardial Infarction Brother     Coronary Artery Disease Brother     Heart Surgery Brother         bypass    Coronary Artery Disease Brother     Obesity Sister     Obesity Brother     Obesity Brother        Review of Systems   The comprehensive Review of Systems is negative other than noted in the HPI or here.     Physical Exam   Vital Signs with Ranges  Temp:  [97.7  F (36.5  C)-99  F (37.2  C)] 98.6  F (37  C)  Pulse:  [55-70] 59  Resp:  [16-18] 18  BP: (107-142)/(61-77) 125/67  SpO2:  [94 %-100 %] 95 %  Wt Readings from Last 4 Encounters:   12/30/23 100 kg (220 lb 6.4 oz)   01/03/23 101.2 kg (223 lb)   10/18/22 101.2 kg (223 lb)   06/12/22  98.4 kg (217 lb)     I/O last 3 completed shifts:  In: 790 [P.O.:340; I.V.:450]  Out: 400 [Urine:400]      Vitals: /67 (BP Location: Left arm, Patient Position: Semi-Huerta's, Cuff Size: Adult Regular)   Pulse 59   Temp 98.6  F (37  C) (Oral)   Resp 18   Wt 100 kg (220 lb 6.4 oz)   SpO2 95%   BMI 30.31 kg/m      General: Alert, oriented, in no acute distress  HEENT: PERRLA, mucous membranes moist  Neck: Normal JVP  CV: Regular rate and rhythm without murmur  Respiratory: Clear to auscultation bilaterally  GI: Normoactive bowel sounds; soft, non-tender abdomen  Neuro: No focal deficits appreciated  Extremities: No peripheral edema bilaterally    Recent Labs   Lab 12/30/23  0231 12/29/23  2255 12/29/23  2058 12/29/23  1912 12/29/23  0743 12/29/23  0632 12/28/23  1220 12/28/23  0653 12/28/23  0123 12/25/23  1745 12/25/23  1424   WBC  --  6.3  --   --   --   --   --   --  8.5  --  13.6*   HGB  --  13.1*  --   --   --   --   --   --  16.3  --  16.9   MCV  --  89  --   --   --   --   --   --  87  --  88   PLT  --  192  --   --   --   --   --   --  250  --  246   INR  --   --   --   --   --   --   --   --  1.03  --   --    NA  --   --   --   --   --  139  --   --  134*  --  135   POTASSIUM  --   --   --   --   --  3.9  --  3.8 2.9*  --  3.8   CHLORIDE  --   --   --   --   --  108*  --   --  96*  --  98   CO2  --   --   --   --   --  25  --   --  24  --  23   BUN  --   --   --   --   --  15.3  --   --  23.6*  --  21.2   CR  --   --   --   --   --  1.06  --   --  1.25*  --  1.27*   GFRESTIMATED  --   --   --   --   --  77  --   --  64  --  62   ANIONGAP  --   --   --   --   --  6*  --   --  14  --  14   LES  --   --   --   --   --  8.4*  --   --  9.1  --  9.5   GLC 93  --  91 146*   < > 102*   < >  --  280*  --  121*   ALBUMIN  --   --   --   --   --   --   --   --  4.1 4.2  --    PROTTOTAL  --   --   --   --   --   --   --   --  7.4 7.2  --    BILITOTAL  --   --   --   --   --   --   --   --  0.7 1.8*  --   "  ALKPHOS  --   --   --   --   --   --   --   --  110 110  --    ALT  --   --   --   --   --   --   --   --  25 17  --    AST  --   --   --   --   --   --   --   --  34 26  --    LIPASE  --   --   --   --   --   --   --   --  52 127*  --     < > = values in this interval not displayed.     Recent Labs   Lab Test 23  0653 21  0926   CHOL 103 124   HDL 28* 41   LDL 51 60   TRIG 121 113     Recent Labs   Lab 23  1424   WBC 6.3 8.5 13.6*   HGB 13.1* 16.3 16.9   HCT 38.4* 46.8 48.8   MCV 89 87 88    250 246     No results for input(s): \"PH\", \"PHV\", \"PO2\", \"PO2V\", \"SAT\", \"PCO2\", \"PCO2V\", \"HCO3\", \"HCO3V\" in the last 168 hours.  No results for input(s): \"NTBNPI\", \"NTBNP\" in the last 168 hours.  Recent Labs   Lab 23  1424   DD 1.12*     No results for input(s): \"SED\", \"CRP\" in the last 168 hours.  Recent Labs   Lab 233 23  1424    250 246     Recent Labs   Lab 23  0123   TSH 7.50*     No results for input(s): \"COLOR\", \"APPEARANCE\", \"URINEGLC\", \"URINEBILI\", \"URINEKETONE\", \"SG\", \"UBLD\", \"URINEPH\", \"PROTEIN\", \"UROBILINOGEN\", \"NITRITE\", \"LEUKEST\", \"RBCU\", \"WBCU\" in the last 168 hours.    Imaging:  Recent Results (from the past 48 hour(s))   Echocardiogram Complete   Result Value    LVEF  55-60%    Narrative    [id number]  [id number]  [id number]  [id number]^GOPAL^CLARE     [facility]  Echocardiography [facility]  [address] [place] [address]     Name: HENRY MEJIA  MRN: [id number]  : 1955  Study Date: 2023 01:35 PM  Age: 68 yrs  Gender: Male  Patient Location: [facility]  Reason For Study: Atrial Fibrillation  Ordering Physician: CLARE HERRON  Referring Physician: Ric Villela MD  Performed By: Elayne Preciado     BSA: 2.2 m2  Height: 71 in  Weight: 215 lb  HR: 62  BP: 110/67 " mmHg  ______________________________________________________________________________  Procedure  Complete Portable Echo Adult. Optison (NDC #[id number]) given intravenously.  ______________________________________________________________________________  Interpretation Summary     The visual ejection fraction is 55-60%. Mild LVH.  The right ventricle is normal in size and function.  There is no pericardial effusion.  ______________________________________________________________________________  Left Ventricle  The left ventricle is normal in size. There is mild concentric left  ventricular hypertrophy. Proximal septal thickening is noted. The visual  ejection fraction is 55-60%. Diastolic Doppler findings (E/E' ratio and/or  other parameters) suggest left ventricular filling pressures are normal. No  regional wall motion abnormalities noted.     Right Ventricle  The right ventricle is normal in size and function.     Atria  Normal left atrial size. Right atrial size is normal.     Mitral Valve  The mitral valve is normal in structure and function. There is trace mitral  regurgitation.     Tricuspid Valve  The tricuspid valve is not well visualized, but is grossly normal. The right  ventricular systolic pressure is approximated at 15.8 mmHg plus the right  atrial pressure. There is trace tricuspid regurgitation.     Aortic Valve  The aortic valve is normal in structure and function.     Pulmonic Valve  The pulmonic valve is not well visualized.     Vessels  The aortic root is normal size. IVC diameter and respiratory changes fall into  an intermediate range suggesting an RA pressure of 8 mmHg.     Pericardium  There is no pericardial effusion.     ______________________________________________________________________________  MMode/2D Measurements & Calculations  IVSd: 1.2 cm  LVIDd: 5.3 cm  LVIDs: 3.2 cm  LVPWd: 1.2 cm  FS: 38.9 %  LV mass(C)d: 256.6 grams  LV mass(C)dI: 118.0 grams/m2     Ao root diam: 3.5 cm  LA  dimension: 4.8 cm  asc Aorta Diam: 3.6 cm  LA/Ao: 1.4  LVOT diam: 2.0 cm  LVOT area: 3.2 cm2  Ao root diam index Ht(cm/m): 2.0  Ao root diam index BSA (cm/m2): 1.6  Asc Ao diam index BSA (cm/m2): 1.6  Asc Ao diam index Ht(cm/m): 2.0  LA Volume (BP): 68.3 ml  LA Volume Index (BP): 31.5 ml/m2  RV Base: 4.1 cm     RWT: 0.45  TAPSE: 1.8 cm     Doppler Measurements & Calculations  MV E max payam: 73.4 cm/sec  MV A max payam: 38.7 cm/sec  MV E/A: 1.9  MV dec time: 0.15 sec  PA acc time: 0.12 sec  TR max payam: 198.7 cm/sec  TR max PG: 15.8 mmHg  E/E' av.6  Lateral E/e': 5.2  Medial E/e': 6.1  RV S Payam: 14.4 cm/sec     ______________________________________________________________________________  Report approved by: Emily Deleon 2023 02:40 PM         Cardiac Catheterization    Narrative      Prox RCA to Mid RCA lesion is 100% stenosed.    Mid LAD lesion is 30% stenosed.    Successful ELLIOTT to mid-RCA            Medical Decision Making       35 MINUTES SPENT BY ME on the date of service doing chart review, history, exam, documentation & further activities per the note.

## 2023-12-30 NOTE — DISCHARGE SUMMARY
[facility]    Discharge Summary  Hospitalist    Date of Admission:  12/28/2023  Date of Discharge:  12/30/2023  Discharging Provider: Manan Dial MD, MD    Discharge Diagnoses   -New onset AFib with RVR. Chads vasc score of 3  -NSTEMI, CAD hx, chest pain. EKG changes inferiorly,   Chronic  of prox to mid RCA, underwent successful PCI to   --mid LAD 30% stenosis, prior mid-distal PCI with patent stent    -Pancreatic lesion. Possible pancreatitis  Outpatient GI follow up with MRI/MRCP    History of Present Illness   Manan Bryant is a 68 year old male who has a history of CAD, Htn, SVT, PVCs, CKD who presents to the ED with concerns of tachycardia. Patient notes that on 12/23 he started having some cold like symptoms of cough and subsequent developed left sided abdominal and chest pain which prompted a visit to the ED on 12/25 where he was diagnosed with pancreatitis. He had a CT of the chest with contrast at that time negative for PE. Lipase and bili mildly elevated. COVID/RSV/flu negative. He was felt able to manage as an outpatient so discharged to home. Since that time he has been able to eat at times, things like oatmeal and fruit which has not caused any worsening of his abdominal pain though he felt like it wasn't quite normal. He has been trying to drink fluids but probably hasn't been eating and drinking enough. His fever has dissipated that began on [place]. Last night he ate some more food because he was hungry and then started noting palpitations. He does get palpitations at times though nothing like this. His apple watch told him he had afib which is a new diagnosis for him. He does have a history of CAD with stent in 2010 and subsequently has had issues with frequent PVCs and intermittent SVT for which he follows with cardiology, has had EP studies and had a cardiac MRI stress test about a year ago. He has tried various beta blockers and currently is on Toprol  XL. He has not missed any doses.      In the ED he was noted to be in afib with RVR, rates into the 150s at times. CT PE study done again and negative, did show pancreatitis at that tail. Lipase and bili have normalized. He denies any left sided abdominal pain or chest pain currently. Received IVF in the ED. Admission requested due to new afib with RVR.        Hospital Course   Manan Bryant was admitted on 12/28/2023.  The following problems were addressed during his hospitalization:    Principal Problem:    Atrial fibrillation with rapid ventricular response (H)  Active Problems:    Hypokalemia    Acute pancreatitis, unspecified complication status, unspecified pancreatitis type    Date of Admission:  12/28/2023         Assessment & Plan  Manan Bryant is a 68 year old male with a history of CAD, HTN, HLD, CKD, SVT, PVCs who is admitted on 12/28/2023 with afib with RVR.      New onset atrial fibrillation with RVR  Frequent PVCs  Paroxysmal SVT  In review of the cardiology notes patient has had long standing issues with PVCs for which he has had an EP study, loop recorder and been on various beta blockers and was deemed high risk for progression to afib. Suspect that this current episode is precipitated by the pancreatitis with his underlying rhythm issues. [id number] is 3.   -given 2.5L in ED  - maint fluids started  - K 2 range and replaced to nl  - converted to NsR am 12/28.   - EKG without ischemic changes on NSR EKG 12/28.   - initated on PTA metoprolol.   - not need dilt gtt yet.   -Mg nl   - TSH mildly elevated  - no chf.   - no etoh   -Nl K and mg levels following replacement  -Echo: nl valve dz, nl EF. No WMA, see below  -pt converted spont to NSR day of admission and remained in NSR for remainder of hospital stay.   - chads vasc score of 3. Risks benefits of anticoagulation discussed with patient  - seen by pharmD liaison.  - started on Xarelto day of admission  - cont with PtA metoprolol and added  prn metoprolol for sustained HR >110 at rest  - 7 day zio patch  - cardiology and pcp follow pu    K  and Mg wnl   -stop asa     Possible Acute pancreatitis   Pancreatic lesion   Unclear etiology   Initial diagnosis on 12/25, lipase and bili have since normalized. He is tolerate a diet without making his pain worse. Abd US on 12/25 negative for cholelithiasis. Calcium normal. Does not drink alcohol.   -check lipid panel--> .   - no eteh.   - nl abd US 12/25.    - no abd pain this am. Benign abd  -nl calcium no culprit meds  -Lipase 52 now.   -benign abd  -seems to meet criteria as has abd pain, mildly elevated lipase and imaging c/w pancreaititis.    seen by [facility]. Unclear etiology.   -pt tolerating regular diet. No abd pain, benign abd exam during stay.   - recommend outpatient MRCP and [facility] clinic follow up to be arranged.         CKD stage 2  Hypokalemia-resolved.   At his baseline creatinine is about 1.2-1.3. K 2.9 on presentation.   -hold hydrochlorothiazide   -K replaced to nl range  -nl bmp today         Elevated troponin, NSTEMI  CAD - ELLIOTT to LAD in 2010  Hypertension  Dyslipidemia  Troponin negative and stable over multiple checks on 12/25 and 12/28. Unlikely to be ischemia.   -tele as above for new afib  -continue PTA ASA, metoprolol,crestor  -hold losartan for now as low nl sbp and leave room for rate control agents if needed.   - tele  -EKG in NSR without ischemic changes  - Echo;   the visual ejection fraction is 55-60%. Mild LVH.  The right ventricle is normal in size and function.  There is no pericardial effusion.  -seen by cardiology   -pt has some mild cp on exam this am  - has had intermittent cp prior to admission.   - per cardiology, pt had cardiac MRI with stress demonstrating normal biventricular size with normal systolic function. Quantitative LVEF 63 %. Quantitative RVEF 57%. Regadenoson induced stress perfusion is negative for ischemia.   Delayed hyperenhancement reveals a small,  subendocardial scar in the apex and apicoseptal portion of the left ventricle consistent with a small scar in the LAD distribution.   -no hx coronary angiogram.      -on crestor LDL 51, HDL 28.   -Angiogram/PCI 12/29:     Prox RCA to Mid RCA lesion is 100% stenosed.    Mid LAD lesion is 30% stenosed.    Successful ELLIOTT to mid-RCA        Pt given brillinta day of angiogram, stopped given needs xarelto. Pt loaded with plavix day of discharge. Treated with asa in hospital. Asa stopped at discharge.   -pt feeling well. No cp or sob.   Feels ready of discharge.     Plan at discharge:   -plavix 75mg every day, for likely 1 year,   - stop asa  - statin. Cont crestor  - cont PTA metoprolol 50mg every day, prn dose added for HR at rest >110  - prn ntg- refilled.   - on Xarelto for afib  - cardiac rehab  - cardiology follow up 2 weeks  Cardiac rehab follow up    Pcp follow up          Mildly elevated TSH  TSH 7.5, free T4 normal at 1.69.   -follow up with PCP for recheck      Elevated glucose  Glucose 280 on presentation, normal to borderline elevated in the past. Hgb A1C of 6.1 in April 2023. Likely has pre-diabetes.   - Hgb A1C 6.4%.   - this am. Now 115.      -cont PTA Farxiga at discharge    Diet:  Regular  DVT Prophylaxis: Pneumatic Compression Devices  Code Status:  Full,      Disposition: 1-2 days pending cardiac work up     Clinically Significant Risk Factors Present on Admission          # Hypokalemia: Lowest K = 2.9 mmol/L in last 2 days, will replace as needed         # Drug Induced Platelet Defect: home medication list includes an antiplatelet medication      # Hypertension: Noted on problem list           Dispo:  discharge home   Pcp and cardiology follow up      Manan Dial MD, MD    Significant Results and Procedures   See hospital course     Pending Results   none  Unresulted Labs Ordered in the Past 30 Days of this Admission       No orders found from 11/28/2023 to 12/29/2023.            Code  Status   Full Code       Primary Care Physician   Ric Villela MD    Physical Exam   Temp: (P) 98.4  F (36.9  C) Temp src: (P) Oral BP: (P) 112/58 Pulse: (P) 62   Resp: (P) 18 SpO2: (P) 95 % O2 Device: (P) None (Room air)    Vitals:    12/28/23 0758 12/30/23 0408   Weight: 97.5 kg (215 lb) 100 kg (220 lb 6.4 oz)     Vital Signs with Ranges  Temp:  [97.8  F (36.6  C)-99  F (37.2  C)] (P) 98.4  F (36.9  C)  Pulse:  [57-70] (P) 62  Resp:  [16-18] (P) 18  BP: (117-142)/(61-77) (P) 112/58  SpO2:  [94 %-100 %] (P) 95 %  I/O last 3 completed shifts:  In: -   Out: 800 [Urine:800]    Constitutional: Nad, in bed  Respiratory:     CTAB  Cardiovascular: RRR No r/g/m  GI: soft, nt, nd  Skin/Integumen: no rash or edema  Neuro: nl speech and mentation  Psych: nl affect       Discharge Disposition   Discharged to home  Condition at discharge: Good    Consultations This Hospital Stay   CARDIOLOGY IP CONSULT  PHARMACY LIAISON FOR MEDICATION COVERAGE CONSULT  PHARMACY IP CONSULT  CARE MANAGEMENT / SOCIAL WORK IP CONSULT  PHARMACY LIAISON FOR MEDICATION COVERAGE CONSULT  GASTROENTEROLOGY IP CONSULT  CARE MANAGEMENT / SOCIAL WORK IP CONSULT  HOSPITALIST IP CONSULT  NUTRITION SERVICES ADULT IP CONSULT  CARDIAC REHAB IP CONSULT  PHARMACY IP CONSULT  SMOKING CESSATION PROGRAM IP CONSULT    Time Spent on this Encounter   IManan MD, personally saw the patient today and spent greater than 30 minutes discharging this patient.    Discharge Orders      CARDIAC REHAB REFERRAL      Follow-Up with Cardiology [facility]      Reason Lipid Lowering Medications not prescribed from this order set     Reason for your hospital stay    A-fib with rapid rate. Now on Xarelto for stroke prevention   Elevated troponin, known coronary artery disease, abnormal EKG out of Afib, chest pain, underwent coronary angiography on 12/29. Occluded RCA s/p stenting of RCA     Activity    Your activity upon discharge: activity as tolerated     Patient  care order    Per pharmacist regarding your Xarelto:  Xarelto-  --Patient will pay 100% of the first $280 in drug costs (first month will be as much as $345)  --Subsequent fills will be $132/mo.  --When/if total drug costs exceed $5,030, cost will increase to a 25% coinsurance, equivalent to $138/mo.   -Beginning April 1, the  of Xarelto offers a mail order program that provides Xarelto for $85/mo (or $240 for 3 mo), regardless of income.  Information can be found at [employer] or by calling [phone].     Follow-up and recommended labs and tests     1. 7 day heart monitor  2. Primary care doctor follow up 1 week, arrange for repeat TSH thyroid test in 2-3 month to recheck level that is slightly elevated  3. Cardiac rehab follow up   4. Cardiology clinic follow up 2 weeks  5. [facility] follow up in several weeks to follow up pancreatic finding and pancreatitis. They will call you. You will need an MRI of pancrease in near future to further characterize pancrease organ finding     Discharge Instructions    - Stop aspirin. Start taking Plavix/Clopidigral for heart disease and stent  -Start taking Xarelto to prevent stroke from atrial fibrillation     Full Code     Adult Leadless EKG Monitor 3 to 7 Days     Diet    Follow this diet upon discharge: Orders Placed This Encounter    Low fat  Diabetic diet     Discharge Medications   Discharge Medication List as of 12/30/2023 10:59 AM        START taking these medications    Details   clopidogrel (PLAVIX) 75 MG tablet Take 1 tablet (75 mg) by mouth daily, Disp-60 tablet, R-0, E-PrescribeFuture refills by PCP Dr. Ric Villela MD with phone number [phone].      metoprolol tartrate (LOPRESSOR) 25 MG tablet Take 1 tablet (25 mg) by mouth 2 times daily as needed (a fib with tachycardia (rates >110 at rest)), Disp-30 tablet, R-0, E-PrescribeFuture refills by PCP Dr. Ric Villela MD with phone number [phone].       rivaroxaban ANTICOAGULANT (XARELTO) 20 MG TABS tablet Take 1 tablet (20 mg) by mouth daily (with dinner), Disp-30 tablet, R-0, E-PrescribeFuture refills by PCP Dr. Ric Villela MD with phone number [phone].           CONTINUE these medications which have CHANGED    Details   nitroGLYcerin (NITROSTAT) 0.4 MG sublingual tablet Place 1 tablet (0.4 mg) under the tongue every 5 minutes as needed for chest pain, Disp-25 tablet, R-3, E-PrescribeFuture refills by PCP Dr. Ric Villela MD with phone number [phone].           CONTINUE these medications which have NOT CHANGED    Details   Cholecalciferol (VITAMIN D PO) Take 1,000 mg by mouth daily , Historical      dapagliflozin (FARXIGA) 10 MG TABS tablet Take 10 mg by mouth daily, 10 mg, Oral, DAILY, Historical      hydrochlorothiazide (HYDRODIURIL) 25 MG tablet Take 1 tablet (25 mg) by mouth daily, Disp-90 tablet, R-4, E-Prescribe      losartan (COZAAR) 100 MG tablet Take 1 tablet (100 mg) by mouth daily, Disp-90 tablet, R-4, E-Prescribe      metoprolol succinate ER (TOPROL XL) 50 MG 24 hr tablet Take 1 tablet (50 mg) by mouth daily, Disp-90 tablet, R-4, E-Prescribe      Multiple Vitamin (MULTI-VITAMIN PO) Take by mouth daily , Historical      omeprazole (PRILOSEC) 20 MG DR capsule TAKE 1 CAPSULE BY MOUTH EVERY DAY, Disp-90 capsule, R-0, E-PrescribePlease let patient know they need an appointment for further refills. Thank you!      rosuvastatin (CRESTOR) 40 MG tablet Take 1 tablet (40 mg) by mouth daily, Disp-90 tablet, R-4, E-Prescribe           STOP taking these medications       ASPIRIN PO Comments:   Reason for Stopping:         pantoprazole (PROTONIX) 40 MG EC tablet Comments:   Reason for Stopping:             Allergies   No Known Allergies  Data   Most Recent 3 CBC's:  Recent Labs   Lab Test 12/29/23  2255 12/28/23  0123 12/25/23  1424   WBC 6.3 8.5 13.6*   HGB 13.1* 16.3 16.9   MCV 89 87 88    250 246      Most Recent 3  BMP's:  Recent Labs   Lab Test 12/30/23  0739 12/30/23  0637 12/30/23  0231 12/29/23  0743 12/29/23  0632 12/28/23  1220 12/28/23  0653 12/28/23  0123   NA  --  139  --   --  139  --   --  134*   POTASSIUM  --  3.8  --   --  3.9  --  3.8 2.9*   CHLORIDE  --  109*  --   --  108*  --   --  96*   CO2  --  20*  --   --  25  --   --  24   BUN  --  12.8  --   --  15.3  --   --  23.6*   CR  --  1.01  --   --  1.06  --   --  1.25*   ANIONGAP  --  10  --   --  6*  --   --  14   LES  --  8.5*  --   --  8.4*  --   --  9.1   GLC 92 89 93   < > 102*   < >  --  280*    < > = values in this interval not displayed.     Most Recent 2 LFT's:  Recent Labs   Lab Test 12/28/23  0123 12/25/23  1745   AST 34 26   ALT 25 17   ALKPHOS 110 110   BILITOTAL 0.7 1.8*     Most Recent INR's and Anticoagulation Dosing History:  Anticoagulation Dose History          Latest Ref Rng & Units 8/5/2010 12/28/2023   Recent Dosing and Labs   INR 0.85 - 1.15 0.95  1.03      Most Recent 3 Troponin's:  Recent Labs   Lab Test 06/21/16  0121 06/20/16  2256   TROPI <0.015  The 99th percentile for upper reference range is 0.045 ug/L.  Troponin values in   the range of 0.045 - 0.120 ug/L may be associated with risks of adverse   clinical events.   <0.015  The 99th percentile for upper reference range is 0.045 ug/L.  Troponin values in   the range of 0.045 - 0.120 ug/L may be associated with risks of adverse   clinical events.       Most Recent Cholesterol Panel:  Recent Labs   Lab Test 12/28/23  0653   CHOL 103   LDL 51   HDL 28*   TRIG 121     Most Recent 6 Bacteria Isolates From Any Culture (See EPIC Reports for Culture Details):No lab results found.  Most Recent TSH, T4 and A1c Labs:  Recent Labs   Lab Test 12/28/23  0123   TSH 7.50*   T4 1.69   A1C 6.4*     Results for orders placed or performed during the hospital encounter of 12/28/23   CT Chest (PE) Abdomen Pelvis w Contrast    Narrative    EXAM: CT CHEST PE ABDOMEN PELVIS W CONTRAST  LOCATION: [facility]  DATE: 2023    INDICATION: sob, tachycardia, pancreatitis  COMPARISON: CT chest 2023  TECHNIQUE: CT chest pulmonary angiogram and routine CT abdomen pelvis with IV contrast. Arterial phase through the chest and venous phase through the abdomen and pelvis. Multiplanar reformats and MIP reconstructions were performed. Dose reduction   techniques were used.   CONTRAST: 112 mL Isovue   370    FINDINGS:  ANGIOGRAM CHEST: Pulmonary arteries are normal caliber and negative for pulmonary emboli. Thoracic aorta is negative for dissection. No CT evidence of right heart strain.     LUNGS AND PLEURA: Mild left basilar atelectasis. Lungs otherwise clear. No pleural effusions. No pneumothorax.    MEDIASTINUM/AXILLAE: No lymphadenopathy. No thoracic aortic aneurysms. No pericardial effusion. Left chest wall implanted cardiac device. Small hiatal hernia.    CORONARY ARTERY CALCIFICATION: Mild.    HEPATOBILIARY: No significant mass or bile duct dilatation. No calcified gallstones.     PANCREAS: Fat stranding about the pancreatic tail. No organized fluid collection. No pancreatic duct dilation.    SPLEEN: Normal.    ADRENAL GLANDS: Normal.    KIDNEYS/BLADDER: Normal.    BOWEL: No obstruction or inflammatory change. Normal appendix.    LYMPH NODES: Normal.    VASCULATURE: Unremarkable.    PELVIC ORGANS: Normal.    MUSCULOSKELETAL: Small periumbilical fat-containing hernia. Mild degenerative changes of the spine.      Impression    IMPRESSION:  1.  No pulmonary embolism. No acute process in the chest.  2.  Acute pancreatitis.   Echocardiogram Complete     Value    LVEF  55-60%    Narrative    [id number]  [id number]  [id number]  [id number]^GOPAL^CLARE     [facility]  Echocardiography Laboratory  [address] [place] [address]     Name: HENRY MEJIA  MRN: [id number]  : 1955  Study Date: 2023 01:35 PM  Age: 68 yrs  Gender: Male  Patient [facility]  Reason  For Study: Atrial Fibrillation  Ordering Physician: CLARE HERRON  Referring Physician: Ric Villela MD  Performed By: Elayne Preciado     BSA: 2.2 m2  Height: 71 in  Weight: 215 lb  HR: 62  BP: 110/67 mmHg  ______________________________________________________________________________  Procedure  Complete Portable Echo Adult. Optison (NDC #5095-3217) given intravenously.  ______________________________________________________________________________  Interpretation Summary     The visual ejection fraction is 55-60%. Mild LVH.  The right ventricle is normal in size and function.  There is no pericardial effusion.  ______________________________________________________________________________  Left Ventricle  The left ventricle is normal in size. There is mild concentric left  ventricular hypertrophy. Proximal septal thickening is noted. The visual  ejection fraction is 55-60%. Diastolic Doppler findings (E/E' ratio and/or  other parameters) suggest left ventricular filling pressures are normal. No  regional wall motion abnormalities noted.     Right Ventricle  The right ventricle is normal in size and function.     Atria  Normal left atrial size. Right atrial size is normal.     Mitral Valve  The mitral valve is normal in structure and function. There is trace mitral  regurgitation.     Tricuspid Valve  The tricuspid valve is not well visualized, but is grossly normal. The right  ventricular systolic pressure is approximated at 15.8 mmHg plus the right  atrial pressure. There is trace tricuspid regurgitation.     Aortic Valve  The aortic valve is normal in structure and function.     Pulmonic Valve  The pulmonic valve is not well visualized.     Vessels  The aortic root is normal size. IVC diameter and respiratory changes fall into  an intermediate range suggesting an RA pressure of 8 mmHg.     Pericardium  There is no pericardial effusion.      ______________________________________________________________________________  MMode/2D Measurements & Calculations  IVSd: 1.2 cm  LVIDd: 5.3 cm  LVIDs: 3.2 cm  LVPWd: 1.2 cm  FS: 38.9 %  LV mass(C)d: 256.6 grams  LV mass(C)dI: 118.0 grams/m2     Ao root diam: 3.5 cm  LA dimension: 4.8 cm  asc Aorta Diam: 3.6 cm  LA/Ao: 1.4  LVOT diam: 2.0 cm  LVOT area: 3.2 cm2  Ao root diam index Ht(cm/m): 2.0  Ao root diam index BSA (cm/m2): 1.6  Asc Ao diam index BSA (cm/m2): 1.6  Asc Ao diam index Ht(cm/m): 2.0  LA Volume (BP): 68.3 ml  LA Volume Index (BP): 31.5 ml/m2  RV Base: 4.1 cm     RWT: 0.45  TAPSE: 1.8 cm     Doppler Measurements & Calculations  MV E max payam: 73.4 cm/sec  MV A max payam: 38.7 cm/sec  MV E/A: 1.9  MV dec time: 0.15 sec  PA acc time: 0.12 sec  TR max payam: 198.7 cm/sec  TR max PG: 15.8 mmHg  E/E' av.6  Lateral E/e': 5.2  Medial E/e': 6.1  RV S Payam: 14.4 cm/sec     ______________________________________________________________________________  Report approved by: Emily Deleon 2023 02:40 PM         Cardiac Catheterization    Narrative      Prox RCA to Mid RCA lesion is 100% stenosed.    Mid LAD lesion is 30% stenosed.    Successful ELLIOTT to mid-RCA

## 2023-12-30 NOTE — PROGRESS NOTES
12/30/23 0900   Appointment Info   Signing Clinician's Name / Credentials (PT) Keisha Quiros, JONAS   Living Environment   People in Home spouse   Current Living Arrangements house   Home Accessibility stairs within home   Number of Stairs, Within Home, Primary ten   Stair Railings, Within Home, Primary railings safe and in good condition;railing on right side (ascending)   Transportation Anticipated car, drives self;family or friend will provide   Self-Care   Usual Activity Tolerance good   Current Activity Tolerance good   Regular Exercise No   Equipment Currently Used at Home none   Fall history within last six months no   General Information   Onset of Illness/Injury or Date of Surgery 12/28/23   Referring Physician Dr. Grove   Patient/Family Therapy Goals Statement (PT) To go home   Pertinent History of Current Problem (include personal factors and/or comorbidities that impact the POC) Pt is a 68 year old male s/p PCI RCA   Existing Precautions/Restrictions cardiac   Cognition   Affect/Mental Status (Cognition) WFL   Orientation Status (Cognition) oriented x 4   Pain Assessment   Patient Currently in Pain No   Range of Motion (ROM)   Range of Motion ROM is WFL   Strength (Manual Muscle Testing)   Strength (Manual Muscle Testing) strength is WFL   Bed Mobility   Bed Mobility no deficits identified   Transfers   Transfers no deficits identified   Gait/Stairs (Locomotion)   [place] Level (Gait) independent   Balance   Balance Comments Good   Clinical Impression   Criteria for Skilled Therapeutic Intervention Yes, treatment indicated   PT Diagnosis (PT) Impaired endurance   Influenced by the following impairments Decreased endurance   Functional limitations due to impairments Difficulty with long distance ambulation   Clinical Presentation (PT Evaluation Complexity) stable   Clinical Presentation Rationale VSS, pain controlled   Clinical Decision Making (Complexity) low complexity   Planned Therapy  Interventions (PT) patient/family education;progressive activity/exercise;risk factor education;home program guidelines   Risk & Benefits of therapy have been explained evaluation/treatment results reviewed;care plan/treatment goals reviewed;risks/benefits reviewed;current/potential barriers reviewed;participants voiced agreement with care plan;participants included;patient   PT Total Evaluation Time   PT Eval, Low Complexity Minutes (94502) 10   PT Discharge Planning   PT Plan Goals met, discharge from inpatient CR. Continue with outpatient CR.   PT Discharge Recommendation (DC Rec) home with outpatient cardiac rehab   PT Rationale for DC Rec Pt is independent with mobility and safe to discharge home. Pt will benefit from outpatient CR to further increase activity tolerance and for cardiac education.   PT Brief overview of current status Independent   Total Session Time   Total Session Time (sum of timed and untimed services) 10

## 2023-12-30 NOTE — PLAN OF CARE
Goal Outcome Evaluation:      Plan of Care Reviewed With: patient  Recd pt from cath lab , stent to prox to mid RCA via Right groin. Site wnl. Up to BR after bedrest and tolerated well. VSS SB 50s. Ate some dinner, some mild abd discomfort after, GI following for recent pancreatitis.  no new orders from earlier visit. + BS. Cardiology continues to follow

## 2023-12-30 NOTE — PLAN OF CARE
A&O x 4. VSS. RA. Tele: SR/SB. Denies CP/SOB. Up SBA. R) groin site WDL. CMS intact. K of 3.8, replacement given.     Pt discharged to home. Discharge instructions provided, questions answered. Discharge medications given to pt. PIV removed without incident. Pt discharged w/Ziopatch. Pt escorted to door six via wheelchair and hospital staff.

## 2023-12-30 NOTE — DISCHARGE INSTRUCTIONS
Cardiac Angioplasty/Stent Discharge Instructions - Femoral    After you go home:    Have an adult stay with you until tomorrow.  Drink extra fluids for 2 days.  You may resume your normal diet.  No smoking       For 24 hours - due to the sedation you received:  Relax and take it easy.  Do NOT make any important or legal decisions.  Do NOT drive or operate machines at home or at work.  Do NOT drink alcohol.    Care of Groin Puncture Site:    For the first 24 hrs - check the puncture site every 1-2 hours while awake.  For 2 days, when you cough, sneeze, laugh or move your bowels, hold your hand over the puncture site and press firmly.  Remove the bandaid after 24 hours. If there is minor oozing, apply another bandaid and remove it after 12 hours.  It is normal to have a small bruise or pea size lump at the site.  You may shower tomorrow. Do NOT take a bath, or use a hot tub or pool for at least 3 days. Do NOT scrub the site. Do not use lotion or powder near the puncture site.     Activity:            For 2 days:  No stooping or squatting  Do NOT do any heavy activity such as exercise, lifting, or straining.   No housework, yard work or any activity that make you sweat  Do NOT lift more than 10 pounds    Bleeding:    If you start bleeding from the site in your groin, lie down flat and press firmly on/above the site for 10 minutes.   Once bleeding stops, lay flat for 2 hours.   Call [facility] as soon as you can.       Call 911 right away if you have heavy bleeding or bleeding that does not stop.      Medicines:    If you are taking an antiplatelet medication such as Plavix, Brilinta or Effient, do not stop taking it until you talk to your cardiologist.      If you are on Metformin (Glucophage), do not restart it until you have blood tests (within 2 to 3 days after discharge).  After you have your blood drawn, you may restart the Metformin.   Take your medications, including blood thinners, unless your provider tells  you not to.    If you take Coumadin (Warfarin), have your INR checked by your provider in  3-5 days. Call your clinic to schedule this.  If you have stopped any medicines, check with your provider about when to restart them.    Follow Up Appointments:    Follow up with [facility] Nurse Practitioner at [facility] of patient preference in 7-10 days.  Cardiac Rehab will contact you for follow up care.    Call the clinic if:    You have increased pain or a large or growing hard lump around the site.  The site is red, swollen, hot or tender.  Blood or fluid is draining from the site.  You have chills or a fever greater than 101 F (38 C).  Your leg feels numb, cool or changes color.  You have hives, a rash or unusual itching.  New pain in the back or belly that you cannot control with Tylenol.  Any questions or concerns.      Other Instructions:    If you received a stent - carry your stent card with you at all times.      [facility]:    [phone] [facility] (7 days a week)

## 2024-01-01 LAB
ATRIAL RATE - MUSE: 56 BPM
DIASTOLIC BLOOD PRESSURE - MUSE: NORMAL MMHG
INTERPRETATION ECG - MUSE: NORMAL
P AXIS - MUSE: 57 DEGREES
PR INTERVAL - MUSE: 152 MS
QRS DURATION - MUSE: 84 MS
QT - MUSE: 440 MS
QTC - MUSE: 424 MS
R AXIS - MUSE: 54 DEGREES
SYSTOLIC BLOOD PRESSURE - MUSE: NORMAL MMHG
T AXIS - MUSE: 12 DEGREES
VENTRICULAR RATE- MUSE: 56 BPM

## 2024-01-02 ENCOUNTER — PATIENT OUTREACH (OUTPATIENT)
Dept: CARE COORDINATION | Facility: CLINIC | Age: 69
End: 2024-01-02
Payer: MEDICARE

## 2024-01-02 LAB
ATRIAL RATE - MUSE: 52 BPM
DIASTOLIC BLOOD PRESSURE - MUSE: NORMAL MMHG
INTERPRETATION ECG - MUSE: NORMAL
P AXIS - MUSE: NORMAL DEGREES
PR INTERVAL - MUSE: NORMAL MS
QRS DURATION - MUSE: 72 MS
QT - MUSE: 428 MS
QTC - MUSE: 437 MS
R AXIS - MUSE: 28 DEGREES
SYSTOLIC BLOOD PRESSURE - MUSE: NORMAL MMHG
T AXIS - MUSE: -18 DEGREES
VENTRICULAR RATE- MUSE: 63 BPM

## 2024-01-02 NOTE — PROGRESS NOTES
Clinic Care Coordination Contact  [facility]: Post-Discharge Note  SITUATION                                                      Admission:    Admission Date: 12/25/23   Reason for Admission: chest pain  Discharge:   Discharge Date: 12/25/23  Discharge Diagnosis: Acute pancreatitis, unspecified complication status, unspecified pancreatitis type    Elevated liver function tests    BACKGROUND                                                      Per hospital discharge summary and inpatient provider notes:  Manan Bryant is a 68 year old male who presents with left lower chest pain that been present for the last 2 to 3 days.  He states that it feels identical to his previous heart attack and this is what concerned him.  He also states that he also has a fever to 101.4, and some cold and flu symptoms as well.  States he is able to do all of his activities of daily living, is concerned about having an MI, but is reassured as he has already checked his MyChart while waiting the waiting room and notes his workup was negative here.  No trauma, no nausea or vomiting, no abdominal pain, does feel comfortable going home if his workup is negative.        ASSESSMENT           Discharge Assessment  How are you doing now that you are home?: great  How are your symptoms? (Red Flag symptoms escalate to triage hotline per guidelines): Improved  Do you feel your condition is stable enough to be safe at home until your provider visit?: Yes  Does the patient have their discharge instructions? : Yes  Does the patient have questions regarding their discharge instructions? : No  Were you started on any new medications or were there changes to any of your previous medications? : Yes  Does the patient have all of their medications?: Yes  Do you have questions regarding any of your medications? : No  Do you have all of your needed medical supplies or equipment (DME)?  (i.e. oxygen tank, CPAP, cane, etc.): Yes  Discharge follow-up  appointment scheduled within 14 calendar days? : Yes                  PLAN                                                      Outpatient Plan:  Schedule an appointment with Ric Villela MD (Internal Medicine) in 1 week (1/1/2024); For repeat evaluation and symptom check  Follow up with [facility] (EMERGENCY MEDICINE)    Future Appointments   Date Time Provider Department Center   1/10/2024  7:30 AM Cherise Del Rosario MD [facility]   1/16/2024  2:15 PM 3, Rh Cardiac Re[facility]   1/30/2024  9:00 AM [facility]   2/15/2024  9:00 AM Tristen Padilla PA-C [facility]   4/30/2024  7:30 AM Ric Villela MD [facility]         For any urgent concerns, please contact our 24 hour nurse triage line: [phone] ([phone])         JUAN PABLO Magana

## 2024-01-03 ENCOUNTER — TELEPHONE (OUTPATIENT)
Dept: CARDIOLOGY | Facility: CLINIC | Age: 69
End: 2024-01-03
Payer: MEDICARE

## 2024-01-03 NOTE — TELEPHONE ENCOUNTER
Patient was admitted to [facility] on 12/28/23 with palpitations, admitted recently diagnosed with pancreatitis. New onset A. Fib with RVR. A-fib with RVR. Converted spontaneously to NSR. Elevated troponin, flat at 59 but T wave inversion.    PMH: CAD, HTN, HLD, CKD, SVT, PVC's.    12/28/23: Echo showed EF of 55-60%. Mild LVH.     2/29/23: Coronary angiogram via RFA resulted in:      Prox RCA to Mid RCA lesion is 100% stenosed.    Mid LAD lesion is 30% stenosed.    Successful ELLIOTT to mid-RCA     At his baseline creatinine is about 1.2-1.3. K 2.9 on presentation-hold Hydrochlorothiazide.    Pt was started on Plavix, Xarelto and Metoprolol PRN in addition to daily PTA Metoprolol dosage. PTA NTG continued and ASA discontinued at time of discharge. Hydrochlorothiazide on hold as above. Discharged wearing a 7 day Zio Patch monitor.    Called patient to discuss any post hospital d/c questions, review medication changes, and confirm f/u appts. Patient denied any questions regarding new medications or changes to PTA medications.     RN confirmed with patient that he was d/c with an adequate supply of the antiplatelet Plavix, and reminded of importance of taking without interruption.     States he is monitoring daily BP's and will be in contact with his Nephrologist  this week regarding Hydrochlorothiazide.    Pt has an Rx for PRN SL Nitroglycerin.     Patient denied any SOB, chest pain, edema or light headedness.    RFA cardiac cath site is without bleeding, swelling, redness or signs of infection.     RN confirmed with patient that he is scheduled for an OV on 2/15/24 at 0850 with ELAINE Tristen Hammond at our [place] Office. Dr. Corley's Team RN phone number provided.    Pt is scheduled for cardiac rehab on 1/16/24 at 1400 in [place].    Patient advised to call clinic with any cardiac related questions or concerns prior to this elaine't. Patient verbalized understanding and agreed with plan. BELINDA Burns RN.

## 2024-01-05 ENCOUNTER — MEDICAL CORRESPONDENCE (OUTPATIENT)
Dept: SCHEDULING | Facility: CLINIC | Age: 69
End: 2024-01-05
Payer: MEDICARE

## 2024-01-08 ENCOUNTER — HOSPITAL ENCOUNTER (OUTPATIENT)
Dept: CARDIAC REHAB | Facility: CLINIC | Age: 69
Discharge: HOME | End: 2024-01-08
Attending: INTERNAL MEDICINE
Payer: MEDICARE

## 2024-01-08 DIAGNOSIS — I25.10 CORONARY ARTERY DISEASE INVOLVING NATIVE CORONARY ARTERY OF NATIVE HEART WITHOUT ANGINA PECTORIS: ICD-10-CM

## 2024-01-08 PROCEDURE — 93797 PHYS/QHP OP CAR RHAB WO ECG: CPT | Mod: 59

## 2024-01-08 PROCEDURE — 93798 PHYS/QHP OP CAR RHAB W/ECG: CPT

## 2024-01-10 ENCOUNTER — MYC MEDICAL ADVICE (OUTPATIENT)
Dept: FAMILY MEDICINE | Facility: CLINIC | Age: 69
End: 2024-01-10

## 2024-01-10 ENCOUNTER — HOSPITAL ENCOUNTER (OUTPATIENT)
Dept: CARDIAC REHAB | Facility: CLINIC | Age: 69
Discharge: HOME | End: 2024-01-10
Attending: INTERNAL MEDICINE
Payer: MEDICARE

## 2024-01-10 DIAGNOSIS — I47.10 PAROXYSMAL SUPRAVENTRICULAR TACHYCARDIA (H): ICD-10-CM

## 2024-01-10 DIAGNOSIS — K85.90 ACUTE PANCREATITIS, UNSPECIFIED COMPLICATION STATUS, UNSPECIFIED PANCREATITIS TYPE: ICD-10-CM

## 2024-01-10 DIAGNOSIS — K76.0 NAFLD (NONALCOHOLIC FATTY LIVER DISEASE): Primary | Chronic | ICD-10-CM

## 2024-01-10 DIAGNOSIS — I48.91 ATRIAL FIBRILLATION WITH RAPID VENTRICULAR RESPONSE (H): ICD-10-CM

## 2024-01-10 DIAGNOSIS — I10 BENIGN ESSENTIAL HYPERTENSION: ICD-10-CM

## 2024-01-10 PROCEDURE — 93798 PHYS/QHP OP CAR RHAB W/ECG: CPT | Performed by: REHABILITATION PRACTITIONER

## 2024-01-10 NOTE — TELEPHONE ENCOUNTER
Please see [facility] message request and advise. Requested labs pended for review. Thank you.     Routing to PCP for review.

## 2024-01-11 NOTE — TELEPHONE ENCOUNTER
Relayed provider message. Pt appreciative. Pt refused scheduling appointment now and will instead schedule lab appointment through his mychart.

## 2024-01-12 ENCOUNTER — LAB (OUTPATIENT)
Dept: LAB | Facility: CLINIC | Age: 69
End: 2024-01-12
Payer: MEDICARE

## 2024-01-12 ENCOUNTER — HOSPITAL ENCOUNTER (OUTPATIENT)
Dept: CARDIAC REHAB | Facility: CLINIC | Age: 69
Discharge: HOME | End: 2024-01-12
Attending: INTERNAL MEDICINE
Payer: MEDICARE

## 2024-01-12 DIAGNOSIS — I10 BENIGN ESSENTIAL HYPERTENSION: ICD-10-CM

## 2024-01-12 DIAGNOSIS — K76.0 NAFLD (NONALCOHOLIC FATTY LIVER DISEASE): Chronic | ICD-10-CM

## 2024-01-12 DIAGNOSIS — I48.91 ATRIAL FIBRILLATION WITH RAPID VENTRICULAR RESPONSE (H): ICD-10-CM

## 2024-01-12 DIAGNOSIS — K85.90 ACUTE PANCREATITIS, UNSPECIFIED COMPLICATION STATUS, UNSPECIFIED PANCREATITIS TYPE: ICD-10-CM

## 2024-01-12 DIAGNOSIS — I47.10 PAROXYSMAL SUPRAVENTRICULAR TACHYCARDIA (H): ICD-10-CM

## 2024-01-12 LAB
ERYTHROCYTE [DISTWIDTH] IN BLOOD BY AUTOMATED COUNT: 12.6 % (ref 10–15)
HCT VFR BLD AUTO: 50.1 % (ref 40–53)
HGB BLD-MCNC: 16.8 G/DL (ref 13.3–17.7)
MCH RBC QN AUTO: 30.3 PG (ref 26.5–33)
MCHC RBC AUTO-ENTMCNC: 33.5 G/DL (ref 31.5–36.5)
MCV RBC AUTO: 90 FL (ref 78–100)
PLATELET # BLD AUTO: 343 10E3/UL (ref 150–450)
RBC # BLD AUTO: 5.54 10E6/UL (ref 4.4–5.9)
WBC # BLD AUTO: 6.4 10E3/UL (ref 4–11)

## 2024-01-12 PROCEDURE — 85027 COMPLETE CBC AUTOMATED: CPT

## 2024-01-12 PROCEDURE — 83690 ASSAY OF LIPASE: CPT

## 2024-01-12 PROCEDURE — 84443 ASSAY THYROID STIM HORMONE: CPT

## 2024-01-12 PROCEDURE — 80053 COMPREHEN METABOLIC PANEL: CPT

## 2024-01-12 PROCEDURE — 36415 COLL VENOUS BLD VENIPUNCTURE: CPT

## 2024-01-12 PROCEDURE — 93798 PHYS/QHP OP CAR RHAB W/ECG: CPT | Performed by: REHABILITATION PRACTITIONER

## 2024-01-13 LAB
ALBUMIN SERPL BCG-MCNC: 4.5 G/DL (ref 3.5–5.2)
ALP SERPL-CCNC: 112 U/L (ref 40–150)
ALT SERPL W P-5'-P-CCNC: 30 U/L (ref 0–70)
ANION GAP SERPL CALCULATED.3IONS-SCNC: 11 MMOL/L (ref 7–15)
AST SERPL W P-5'-P-CCNC: 29 U/L (ref 0–45)
BILIRUB SERPL-MCNC: 0.7 MG/DL
BUN SERPL-MCNC: 23 MG/DL (ref 8–23)
CALCIUM SERPL-MCNC: 9.9 MG/DL (ref 8.8–10.2)
CHLORIDE SERPL-SCNC: 105 MMOL/L (ref 98–107)
CREAT SERPL-MCNC: 1.26 MG/DL (ref 0.67–1.17)
DEPRECATED HCO3 PLAS-SCNC: 25 MMOL/L (ref 22–29)
EGFRCR SERPLBLD CKD-EPI 2021: 63 ML/MIN/1.73M2
GLUCOSE SERPL-MCNC: 100 MG/DL (ref 70–99)
LIPASE SERPL-CCNC: 33 U/L (ref 13–60)
POTASSIUM SERPL-SCNC: 4.7 MMOL/L (ref 3.4–5.3)
PROT SERPL-MCNC: 7.3 G/DL (ref 6.4–8.3)
SODIUM SERPL-SCNC: 141 MMOL/L (ref 135–145)
TSH SERPL DL<=0.005 MIU/L-ACNC: 4.13 UIU/ML (ref 0.3–4.2)

## 2024-01-14 NOTE — RESULT ENCOUNTER NOTE
Ismael Hinkle,    I had the opportunity to review your recent labs and a summary of your labs reads as follows:    Your complete blood counts show no sign of anemia, normal white blood cell count and platelets.  Your comprehensive metabolic panel showed normal renal function, normal liver function   Your lipase appears stable as well  Your thyroid function is stable     Sincerely,  Ric Villela MD

## 2024-01-15 ENCOUNTER — HOSPITAL ENCOUNTER (OUTPATIENT)
Dept: CARDIAC REHAB | Facility: CLINIC | Age: 69
Discharge: HOME | End: 2024-01-15
Attending: INTERNAL MEDICINE
Payer: MEDICARE

## 2024-01-15 PROCEDURE — 93798 PHYS/QHP OP CAR RHAB W/ECG: CPT

## 2024-01-17 ENCOUNTER — HOSPITAL ENCOUNTER (OUTPATIENT)
Dept: MRI IMAGING | Facility: CLINIC | Age: 69
Discharge: HOME | End: 2024-01-17
Attending: INTERNAL MEDICINE | Admitting: INTERNAL MEDICINE
Payer: MEDICARE

## 2024-01-17 DIAGNOSIS — K85.90 ACUTE PANCREATITIS WITHOUT INFECTION OR NECROSIS, UNSPECIFIED PANCREATITIS TYPE: ICD-10-CM

## 2024-01-17 PROCEDURE — 255N000002 HC RX 255 OP 636: Performed by: INTERNAL MEDICINE

## 2024-01-17 PROCEDURE — 74183 MRI ABD W/O CNTR FLWD CNTR: CPT

## 2024-01-17 PROCEDURE — A9585 GADOBUTROL INJECTION: HCPCS | Performed by: INTERNAL MEDICINE

## 2024-01-17 RX ORDER — GADOBUTROL 604.72 MG/ML
10 INJECTION INTRAVENOUS ONCE
Status: COMPLETED | OUTPATIENT
Start: 2024-01-17 | End: 2024-01-17

## 2024-01-17 RX ADMIN — GADOBUTROL 10 ML: 604.72 INJECTION INTRAVENOUS at 07:43

## 2024-01-19 ENCOUNTER — HOSPITAL ENCOUNTER (OUTPATIENT)
Dept: CARDIAC REHAB | Facility: CLINIC | Age: 69
Discharge: HOME | End: 2024-01-19
Attending: INTERNAL MEDICINE
Payer: MEDICARE

## 2024-01-19 PROCEDURE — 93798 PHYS/QHP OP CAR RHAB W/ECG: CPT | Performed by: REHABILITATION PRACTITIONER

## 2024-01-22 ENCOUNTER — HOSPITAL ENCOUNTER (OUTPATIENT)
Dept: CARDIAC REHAB | Facility: CLINIC | Age: 69
Discharge: HOME | End: 2024-01-22
Attending: INTERNAL MEDICINE
Payer: MEDICARE

## 2024-01-22 ENCOUNTER — MYC MEDICAL ADVICE (OUTPATIENT)
Dept: CARDIOLOGY | Facility: CLINIC | Age: 69
End: 2024-01-22
Payer: MEDICARE

## 2024-01-22 DIAGNOSIS — I25.10 CORONARY ARTERY DISEASE INVOLVING NATIVE CORONARY ARTERY OF NATIVE HEART WITHOUT ANGINA PECTORIS: ICD-10-CM

## 2024-01-22 PROCEDURE — 93798 PHYS/QHP OP CAR RHAB W/ECG: CPT

## 2024-01-22 RX ORDER — CLOPIDOGREL BISULFATE 75 MG/1
75 TABLET ORAL DAILY
Qty: 90 TABLET | Refills: 3 | Status: SHIPPED | OUTPATIENT
Start: 2024-01-22

## 2024-01-23 NOTE — TELEPHONE ENCOUNTER
See mychart encounter regarding refill request, runs of NSVT on monitoring. Dr Corley's reply sent to patient-    Abbe Corley MD  You13 hours ago (7:21 PM)     He has done this previously.  I will continue his metoprolol succinate 50 mg daily.  He has preserved left ventricular function and he was revascularized.  Will continue to watch it going forward.  My guess is he will not need the appointment with Dr. Shahla Wooten.

## 2024-01-24 ENCOUNTER — HOSPITAL ENCOUNTER (OUTPATIENT)
Dept: CARDIAC REHAB | Facility: CLINIC | Age: 69
Discharge: HOME | End: 2024-01-24
Attending: INTERNAL MEDICINE
Payer: MEDICARE

## 2024-01-24 PROCEDURE — 93798 PHYS/QHP OP CAR RHAB W/ECG: CPT | Performed by: REHABILITATION PRACTITIONER

## 2024-01-26 ENCOUNTER — HOSPITAL ENCOUNTER (OUTPATIENT)
Dept: CARDIAC REHAB | Facility: CLINIC | Age: 69
Discharge: HOME | End: 2024-01-26
Attending: INTERNAL MEDICINE
Payer: MEDICARE

## 2024-01-26 PROCEDURE — 93798 PHYS/QHP OP CAR RHAB W/ECG: CPT | Performed by: REHABILITATION PRACTITIONER

## 2024-01-29 ENCOUNTER — HOSPITAL ENCOUNTER (OUTPATIENT)
Dept: CARDIAC REHAB | Facility: CLINIC | Age: 69
Discharge: HOME | End: 2024-01-29
Attending: INTERNAL MEDICINE
Payer: MEDICARE

## 2024-01-29 PROCEDURE — 93798 PHYS/QHP OP CAR RHAB W/ECG: CPT

## 2024-01-30 ENCOUNTER — OFFICE VISIT (OUTPATIENT)
Dept: FAMILY MEDICINE | Facility: CLINIC | Age: 69
End: 2024-01-30
Payer: MEDICARE

## 2024-01-30 VITALS
RESPIRATION RATE: 19 BRPM | TEMPERATURE: 96.9 F | SYSTOLIC BLOOD PRESSURE: 123 MMHG | OXYGEN SATURATION: 97 % | HEART RATE: 58 BPM | HEIGHT: 71 IN | BODY MASS INDEX: 29.55 KG/M2 | DIASTOLIC BLOOD PRESSURE: 78 MMHG | WEIGHT: 211.1 LBS

## 2024-01-30 DIAGNOSIS — I10 BENIGN ESSENTIAL HYPERTENSION: ICD-10-CM

## 2024-01-30 DIAGNOSIS — L73.9 FOLLICULITIS: ICD-10-CM

## 2024-01-30 DIAGNOSIS — I47.29 NSVT (NONSUSTAINED VENTRICULAR TACHYCARDIA) (H): ICD-10-CM

## 2024-01-30 DIAGNOSIS — I25.10 CORONARY ARTERY DISEASE INVOLVING NATIVE CORONARY ARTERY OF NATIVE HEART WITHOUT ANGINA PECTORIS: Primary | Chronic | ICD-10-CM

## 2024-01-30 DIAGNOSIS — I49.3 PVC (PREMATURE VENTRICULAR CONTRACTION): ICD-10-CM

## 2024-01-30 DIAGNOSIS — E78.5 HYPERLIPIDEMIA LDL GOAL <70: Chronic | ICD-10-CM

## 2024-01-30 DIAGNOSIS — L71.9 ROSACEA: ICD-10-CM

## 2024-01-30 PROCEDURE — 90662 IIV NO PRSV INCREASED AG IM: CPT | Performed by: INTERNAL MEDICINE

## 2024-01-30 PROCEDURE — G0008 ADMIN INFLUENZA VIRUS VAC: HCPCS | Performed by: INTERNAL MEDICINE

## 2024-01-30 PROCEDURE — 99214 OFFICE O/P EST MOD 30 MIN: CPT | Mod: 25 | Performed by: INTERNAL MEDICINE

## 2024-01-30 RX ORDER — MUPIROCIN 20 MG/G
OINTMENT TOPICAL DAILY
Qty: 15 G | Refills: 1 | Status: SHIPPED | OUTPATIENT
Start: 2024-01-30 | End: 2024-03-07

## 2024-01-30 RX ORDER — METRONIDAZOLE 7.5 MG/G
GEL TOPICAL 2 TIMES DAILY
Qty: 45 G | Refills: 3 | Status: SHIPPED | OUTPATIENT
Start: 2024-01-30

## 2024-01-30 RX ORDER — METRONIDAZOLE 10 MG/G
1 GEL TOPICAL DAILY
COMMUNITY
Start: 2021-11-12 | End: 2024-01-30

## 2024-01-30 ASSESSMENT — PAIN SCALES - GENERAL: PAINLEVEL: NO PAIN (0)

## 2024-01-30 NOTE — PROGRESS NOTES
"Garcia Butt is a 68 year old, presenting for the following health issues:  Follow Up    Via the Health Maintenance questionnaire, the patient has reported the following services have been completed -Colonscopy, this information has been sent to the abstraction team.  HPI       Coronary artery disease involving native coronary artery of native heart without angina pectoris  Hyperlipidemia LDL goal <70   Peter M Annette has had resolution of his recent abdominal pain.  No chest pain.  Has been following with cardiac rehab and going well.  He tolerating medications of Plavix, and statin.  Blood pressure has been stable as well when checked in cardiac rehabilitation.  Notes no urinary or bowel concerns.            Review of Systems  Constitutional, HEENT, cardiovascular, pulmonary, gi and gu systems are negative, except as otherwise noted.      Objective    /78 (BP Location: Left arm, Patient Position: Sitting, Cuff Size: Adult Large)   Pulse 58   Temp 96.9  F (36.1  C) (Tympanic)   Resp 19   Ht 1.803 m (5' 11\")   Wt 95.8 kg (211 lb 1.6 oz)   SpO2 97%   BMI 29.44 kg/m    Body mass index is 29.44 kg/m .  Physical Exam   GENERAL: alert and no distress    NECK: no adenopathy, no asymmetry, masses, or scars  RESP: lungs clear to auscultation - no rales, rhonchi or wheezes  CV: regular rate and rhythm, normal S1 S2, no S3 or S4, no murmur,  no peripheral edema  ABDOMEN: soft, nontender,    MS: no gross musculoskeletal defects noted, no edema  SKIN: no suspicious lesions or rashes  NEURO: Normal strength and tone, mentation intact and speech normal  PSYCH: mentation appears normal, affect normal/bright  LYMPH: no cervical, supraclavicular, axillary, or inguinal adenopathy    Patient Instructions   (I25.10) Coronary artery disease involving native coronary artery of native heart without angina pectoris  (primary encounter diagnosis)  Comment: Doing well with cardiac rehab.  Continue metoprolol., rosuvastatin " and plavix with follow up in cardiology as well   Plan:     (E78.5) Hyperlipidemia LDL goal <70  Comment: Continue rosuvastatin   Plan:     (L71.9) Rosacea  Comment: OK for refill of metronidazole   Plan:     (L73.9) Folliculitis  Comment: OK for 1 week use of mupirocin to treat folliculitis   Plan:     (I49.3) PVC (premature ventricular contraction)  Comment: continue metoprolol with follow up in cardiology   Plan:     (I10) Benign essential hypertension  Comment: as above - blood pressure is excellent today  Plan:     (I47.29) NSVT (nonsustained ventricular tachycardia)  Comment: Noted, that we will defer to cardiology for need for electrophysiology   Plan:     Pancreatic Cyst  Comment; Plan to follow up in gastroenterology and consider repeat MRI in 3-6 months at direction of gastroenterologist.           Signed Electronically by: Ric Villela MD, MD

## 2024-01-30 NOTE — PATIENT INSTRUCTIONS
(I25.10) Coronary artery disease involving native coronary artery of native heart without angina pectoris  (primary encounter diagnosis)  Comment: Doing well with cardiac rehab.  Continue metoprolol., rosuvastatin and plavix with follow up in cardiology as well   Plan:     (E78.5) Hyperlipidemia LDL goal <70  Comment: Continue rosuvastatin   Plan:     (L71.9) Rosacea  Comment: OK for refill of metronidazole   Plan:     (L73.9) Folliculitis  Comment: OK for 1 week use of mupirocin to treat folliculitis   Plan:     (I49.3) PVC (premature ventricular contraction)  Comment: continue metoprolol with follow up in cardiology   Plan:     (I10) Benign essential hypertension  Comment: as above - blood pressure is excellent today  Plan:     (I47.29) NSVT (nonsustained ventricular tachycardia)  Comment: Noted, that we will defer to cardiology for need for electrophysiology   Plan:     Pancreatic Cyst  Comment; Plan to follow up in gastroenterology and consider repeat MRI in 3-6 months at direction of gastroenterologist.

## 2024-01-31 ENCOUNTER — HOSPITAL ENCOUNTER (OUTPATIENT)
Dept: CARDIAC REHAB | Facility: CLINIC | Age: 69
Discharge: HOME | End: 2024-01-31
Attending: INTERNAL MEDICINE
Payer: MEDICARE

## 2024-01-31 PROCEDURE — 93798 PHYS/QHP OP CAR RHAB W/ECG: CPT

## 2024-02-02 ENCOUNTER — HOSPITAL ENCOUNTER (OUTPATIENT)
Dept: CARDIAC REHAB | Facility: CLINIC | Age: 69
Discharge: HOME | End: 2024-02-02
Attending: INTERNAL MEDICINE
Payer: MEDICARE

## 2024-02-02 PROCEDURE — 93798 PHYS/QHP OP CAR RHAB W/ECG: CPT

## 2024-02-03 ENCOUNTER — MYC MEDICAL ADVICE (OUTPATIENT)
Dept: FAMILY MEDICINE | Facility: CLINIC | Age: 69
End: 2024-02-03
Payer: MEDICARE

## 2024-02-03 ENCOUNTER — MYC REFILL (OUTPATIENT)
Dept: CARDIOLOGY | Facility: CLINIC | Age: 69
End: 2024-02-03
Payer: MEDICARE

## 2024-02-03 DIAGNOSIS — I25.10 CORONARY ARTERY DISEASE INVOLVING NATIVE CORONARY ARTERY OF NATIVE HEART WITHOUT ANGINA PECTORIS: Chronic | ICD-10-CM

## 2024-02-03 DIAGNOSIS — K21.9 GASTROESOPHAGEAL REFLUX DISEASE WITHOUT ESOPHAGITIS: ICD-10-CM

## 2024-02-05 ENCOUNTER — HOSPITAL ENCOUNTER (OUTPATIENT)
Dept: CARDIAC REHAB | Facility: CLINIC | Age: 69
Discharge: HOME | End: 2024-02-05
Attending: INTERNAL MEDICINE
Payer: MEDICARE

## 2024-02-05 PROCEDURE — 93798 PHYS/QHP OP CAR RHAB W/ECG: CPT

## 2024-02-05 RX ORDER — METOPROLOL SUCCINATE 50 MG/1
50 TABLET, EXTENDED RELEASE ORAL DAILY
Qty: 90 TABLET | Refills: 0 | Status: SHIPPED | OUTPATIENT
Start: 2024-02-05 | End: 2024-02-15

## 2024-02-05 NOTE — TELEPHONE ENCOUNTER
Prescription approved per [facility] Refill Protocol.  Bernaedtte Skaggs RN  [facility]

## 2024-02-05 NOTE — TELEPHONE ENCOUNTER
[facility] Cardiology Refill Guideline reviewed.  Medication meets criteria for refill. Cardiology follow up is scheduled 2/15/24.

## 2024-02-06 ENCOUNTER — TRANSFERRED RECORDS (OUTPATIENT)
Dept: HEALTH INFORMATION MANAGEMENT | Facility: CLINIC | Age: 69
End: 2024-02-06
Payer: MEDICARE

## 2024-02-07 ENCOUNTER — TRANSFERRED RECORDS (OUTPATIENT)
Dept: HEALTH INFORMATION MANAGEMENT | Facility: CLINIC | Age: 69
End: 2024-02-07
Payer: MEDICARE

## 2024-02-07 ENCOUNTER — HOSPITAL ENCOUNTER (OUTPATIENT)
Dept: CARDIAC REHAB | Facility: CLINIC | Age: 69
Discharge: HOME | End: 2024-02-07
Attending: INTERNAL MEDICINE
Payer: MEDICARE

## 2024-02-07 PROCEDURE — 93798 PHYS/QHP OP CAR RHAB W/ECG: CPT | Performed by: REHABILITATION PRACTITIONER

## 2024-02-09 ENCOUNTER — HOSPITAL ENCOUNTER (OUTPATIENT)
Dept: CARDIAC REHAB | Facility: CLINIC | Age: 69
Discharge: HOME | End: 2024-02-09
Attending: INTERNAL MEDICINE
Payer: MEDICARE

## 2024-02-09 PROCEDURE — 93798 PHYS/QHP OP CAR RHAB W/ECG: CPT

## 2024-02-10 ENCOUNTER — MYC REFILL (OUTPATIENT)
Dept: CARDIOLOGY | Facility: CLINIC | Age: 69
End: 2024-02-10
Payer: MEDICARE

## 2024-02-10 DIAGNOSIS — I10 BENIGN ESSENTIAL HYPERTENSION: ICD-10-CM

## 2024-02-10 DIAGNOSIS — E78.5 HYPERLIPIDEMIA LDL GOAL <70: Chronic | ICD-10-CM

## 2024-02-12 RX ORDER — ROSUVASTATIN CALCIUM 40 MG/1
40 TABLET, COATED ORAL DAILY
Qty: 90 TABLET | Refills: 0 | Status: SHIPPED | OUTPATIENT
Start: 2024-02-12 | End: 2024-02-15

## 2024-02-12 RX ORDER — LOSARTAN POTASSIUM 100 MG/1
100 TABLET ORAL DAILY
Qty: 90 TABLET | Refills: 0 | Status: SHIPPED | OUTPATIENT
Start: 2024-02-12 | End: 2024-02-15

## 2024-02-12 NOTE — TELEPHONE ENCOUNTER
[facility] Cardiology Refill Guideline reviewed.  Medication meets criteria for refill. Annual follow up is scheduled for 2/15/24. Labs are up to date.

## 2024-02-13 ENCOUNTER — HOSPITAL ENCOUNTER (OUTPATIENT)
Dept: CARDIAC REHAB | Facility: CLINIC | Age: 69
Discharge: HOME | End: 2024-02-13
Attending: INTERNAL MEDICINE
Payer: MEDICARE

## 2024-02-13 PROCEDURE — 93798 PHYS/QHP OP CAR RHAB W/ECG: CPT

## 2024-02-15 ENCOUNTER — OFFICE VISIT (OUTPATIENT)
Dept: CARDIOLOGY | Facility: CLINIC | Age: 69
End: 2024-02-15
Payer: MEDICARE

## 2024-02-15 VITALS
HEART RATE: 57 BPM | BODY MASS INDEX: 29.89 KG/M2 | WEIGHT: 213.5 LBS | SYSTOLIC BLOOD PRESSURE: 113 MMHG | DIASTOLIC BLOOD PRESSURE: 70 MMHG | HEIGHT: 71 IN | OXYGEN SATURATION: 97 %

## 2024-02-15 DIAGNOSIS — I10 BENIGN ESSENTIAL HYPERTENSION: ICD-10-CM

## 2024-02-15 DIAGNOSIS — I25.10 CORONARY ARTERY DISEASE INVOLVING NATIVE CORONARY ARTERY OF NATIVE HEART WITHOUT ANGINA PECTORIS: Chronic | ICD-10-CM

## 2024-02-15 DIAGNOSIS — E78.5 HYPERLIPIDEMIA LDL GOAL <70: Chronic | ICD-10-CM

## 2024-02-15 PROCEDURE — 99215 OFFICE O/P EST HI 40 MIN: CPT | Performed by: PHYSICIAN ASSISTANT

## 2024-02-15 RX ORDER — METOPROLOL SUCCINATE 50 MG/1
50 TABLET, EXTENDED RELEASE ORAL DAILY
Qty: 90 TABLET | Refills: 3 | Status: SHIPPED | OUTPATIENT
Start: 2024-02-15

## 2024-02-15 RX ORDER — LOSARTAN POTASSIUM 100 MG/1
100 TABLET ORAL DAILY
Qty: 90 TABLET | Refills: 3 | Status: SHIPPED | OUTPATIENT
Start: 2024-02-15

## 2024-02-15 RX ORDER — ROSUVASTATIN CALCIUM 40 MG/1
40 TABLET, COATED ORAL DAILY
Qty: 90 TABLET | Refills: 3 | Status: SHIPPED | OUTPATIENT
Start: 2024-02-15

## 2024-02-15 RX ORDER — METOPROLOL TARTRATE 25 MG/1
25 TABLET, FILM COATED ORAL 2 TIMES DAILY PRN
Qty: 180 TABLET | Refills: 3 | Status: SHIPPED | OUTPATIENT
Start: 2024-02-15

## 2024-02-15 NOTE — PROGRESS NOTES
Primary Cardiologist: Dr. Corley    Reason for Visit: Annual follow up and discussion of holding plavix/xarelto for upcoming EGD procedure for pancreatic mass     History of Present Illness:   Daquan is a very pleasant 68 year old male with past medical history notable for CAD (hx of inferior MI in 8/2010; s/p aspiration thrombectomy and ELLIOTT to LAD and more recently he was ruled in for NSTEMI and received ELLIOTT to prox  RCA on 12/29/2023; on plavix therapy), recent episode of atrial fibrillation RVR in the setting of acute pancreatitis (on xarelto due to high CHADSVASC score; TSH nl; echo showed normal atrial sizes; recent ambulatory monitor showed no recurrent A fib- likely provoked from acute event; has follow up with EP to discuss this and recent NSVT episodes), NSVT/PVC's (managed on metoprolol), recent hx of pancreatic mass (needs EGD with biopsy for further evaluation of this- currently scheduled on 3/12), hyperlipidemia, and hypertension.     Daquan reports no new symptoms today. He is scheduled for EGD with biopsy on 3/12 and wonders if he can hold his plavix and xarelto. He is also concerned about his episodes of NSVT noted on recent ambulatory monitor. His LVEF is preserved per recent echo a few months ago. He has no symptoms of palpitations, CP, SOB, lightheadedness, or syncope. He was recommended to increase his metoprolol on discharge but he was nervous about his nighttime HRs dipping more, so he has tartrate form in addition as a pill in the pocket regimen. He has not needed to take any additional metoprolol since his admission in 12/2023. He has had no bleeding issues with plavix and xarelto.     Assessment and Plan:  Daquan is a pleasant 68 year old male with past medical history notable for:     # NSTEMI in 12/2023, s/p ELLIOTT to  RCA  -- on plavic therapy   -- no recurrent angina   -- it sounds like his symptoms were more from his acute pancreatits     # Hx of CAD   -- hx of inferior MI in 2018,  receiving ELLIOTT to LAD  -- on plavix, statin, and BB   -- LVEF normal   -- risk factors under good control     # Recent Hx of PAF  -- RVR in the setting of acute pancreatitis   -- no recurrence per recent ambulatory monitor   -- echo with normal atrial sizes/LVEF/valve fxn; TSH nl  -- likely provoked from acute event  -- currently on Xarelto therapy but I suspect this will be discontinued during his upcoming EP visit     # Recently found Pancreatic Mass   -- follows with [facility] and is referred to pancreas clinic  -- he is scheduled for EGD with bx on 3/12  -- he needs to hold his plavix and xarelto- I will check with Dr. Corley regarding the recommended timeline for this     # NSVT/PVC's  -- recent ambulatory monitor showed 10-beat VT run   -- on BB  -- normal LVEF  -- pt concerned about this and would like to see EP team    # HTN  -- controlled     # HLD   -- at goal     45 minutes spent on the date of the encounter with chart review, patient visit, care coordination, and documentation.      This note was completed in part using Dragon voice recognition software. Although reviewed after completion, some word and grammatical errors may occur.    Orders this Visit:  No orders of the defined types were placed in this encounter.    Orders Placed This Encounter   Medications    losartan (COZAAR) 100 MG tablet     Sig: Take 1 tablet (100 mg) by mouth daily     Dispense:  90 tablet     Refill:  3    metoprolol succinate ER (TOPROL XL) 50 MG 24 hr tablet     Sig: Take 1 tablet (50 mg) by mouth daily     Dispense:  90 tablet     Refill:  3    metoprolol tartrate (LOPRESSOR) 25 MG tablet     Sig: Take 1 tablet (25 mg) by mouth 2 times daily as needed (a fib with tachycardia (rates >110 at rest))     Dispense:  180 tablet     Refill:  3    rosuvastatin (CRESTOR) 40 MG tablet     Sig: Take 1 tablet (40 mg) by mouth daily     Dispense:  90 tablet     Refill:  3     Medications Discontinued During This Encounter   Medication  Reason    metoprolol tartrate (LOPRESSOR) 25 MG tablet Reorder (No AVS)    metoprolol succinate ER (TOPROL XL) 50 MG 24 hr tablet Reorder (No AVS)    losartan (COZAAR) 100 MG tablet Reorder (No AVS)    rosuvastatin (CRESTOR) 40 MG tablet Reorder (No AVS)         Encounter Diagnoses   Name Primary?    Benign essential hypertension     Coronary artery disease involving native coronary artery of native heart without angina pectoris     Hyperlipidemia LDL goal <70        CURRENT MEDICATIONS:  Current Outpatient Medications   Medication Sig Dispense Refill    Cholecalciferol (VITAMIN D PO) Take 1,000 mg by mouth daily       clopidogrel (PLAVIX) 75 MG tablet Take 1 tablet (75 mg) by mouth daily 90 tablet 3    dapagliflozin (FARXIGA) 10 MG TABS tablet Take 10 mg by mouth daily      losartan (COZAAR) 100 MG tablet Take 1 tablet (100 mg) by mouth daily 90 tablet 3    metoprolol succinate ER (TOPROL XL) 50 MG 24 hr tablet Take 1 tablet (50 mg) by mouth daily 90 tablet 3    metoprolol tartrate (LOPRESSOR) 25 MG tablet Take 1 tablet (25 mg) by mouth 2 times daily as needed (a fib with tachycardia (rates >110 at rest)) 180 tablet 3    metroNIDAZOLE (METROGEL) 0.75 % external gel Apply topically 2 times daily 45 g 3    Multiple Vitamin (MULTI-VITAMIN PO) Take by mouth daily       mupirocin (BACTROBAN) 2 % external ointment Apply topically daily 15 g 1    nitroGLYcerin (NITROSTAT) 0.4 MG sublingual tablet Place 1 tablet (0.4 mg) under the tongue every 5 minutes as needed for chest pain 25 tablet 3    omeprazole (PRILOSEC) 20 MG DR capsule Take 1 capsule (20 mg) by mouth daily 90 capsule 0    rivaroxaban ANTICOAGULANT (XARELTO) 20 MG TABS tablet Take 1 tablet (20 mg) by mouth daily (with dinner) 90 tablet 3    rosuvastatin (CRESTOR) 40 MG tablet Take 1 tablet (40 mg) by mouth daily 90 tablet 3    hydrochlorothiazide (HYDRODIURIL) 25 MG tablet Take 1 tablet (25 mg) by mouth daily (Patient not taking: Reported on 1/30/2024) 90 tablet  4       ALLERGIES   No Known Allergies    PAST MEDICAL HISTORY:  Past Medical History:   Diagnosis Date    CAD (coronary artery disease)     cardiac cath 8/5/2010: ELLIOTT to LAD    Essential hypertension, benign     Family history of early CAD     History of acute anterior wall MI 2010 2010    History of colonic polyps     Mixed hyperlipidemia     Myocardial infarction (H)     Paroxysmal supraventricular tachycardia     PVC (premature ventricular contraction)     Hx ventricular tachycardia during cardiac rehab 2010    SVT (supraventricular tachycardia)     Syncope     episode 2016 - EP study - loop recorder implanted 2016 - non-functional after 3 years       PAST SURGICAL HISTORY:  Past Surgical History:   Procedure Laterality Date    COLONOSCOPY      CV CORONARY ANGIOGRAM N/A 12/29/2023    Procedure: Coronary Angiogram;  Surgeon: Edson Benson MD;  Location: [facility] CARDIAC CATH LAB    CV PCI N/A 12/29/2023    Procedure: Percutaneous Coronary Intervention;  Surgeon: Edson Bensno MD;  Location: [facility] CARDIAC CATH LAB    STENT, CORONARY, EMEKA  8/2010    LAD       FAMILY HISTORY:  Family History   Problem Relation Age of Onset    Coronary Artery Disease Father     Myocardial Infarction Father     Coronary Artery Disease Brother     Myocardial Infarction Brother     Heart Surgery Brother         bypass    Coronary Artery Disease Paternal Grandfather     Myocardial Infarction Paternal Grandfather     Sleep Apnea Sister     Family History Negative Mother     Family History Negative Maternal Grandmother     Heart Failure Maternal Grandfather     Family History Negative Paternal Grandmother     Myocardial Infarction Brother     Coronary Artery Disease Brother     Heart Surgery Brother         bypass    Coronary Artery Disease Brother     Obesity Sister     Obesity Brother     Obesity Brother        SOCIAL HISTORY:  Social History     Socioeconomic History    Marital status:      Spouse name:  None    Number of children: None    Years of education: None    Highest education level: None   Tobacco Use    Smoking status: Never    Smokeless tobacco: Never   Substance and Sexual Activity    Alcohol use: No     Alcohol/week: 0.0 standard drinks of alcohol    Drug use: No    Sexual activity: Not Currently     Partners: Female     Birth control/protection: Male Surgical   Other Topics Concern    Parent/sibling w/ CABG, MI or angioplasty before 65F 55M? Yes     Comment: Both brothers - one in his early 40's.  Both have repeated.    Caffeine Concern No     Comment: decaf: 1-2 cups a day. Diek coke: 4-5 cans a day    Sleep Concern No    Stress Concern No    Weight Concern No    Special Diet Yes     Comment: limited, heart healthy    Exercise Yes     Comment: bike outside, 1-3x a week    Bike Helmet Yes    Seat Belt Yes   Social History Narrative    ,    Retired - continues      Social Determinants of Health     Financial Resource Strain: Low Risk  (1/30/2024)    Financial Resource Strain     Within the past 12 months, have you or your family members you live with been unable to get utilities (heat, electricity) when it was really needed?: No   Food Insecurity: Low Risk  (1/30/2024)    Food Insecurity     Within the past 12 months, did you worry that your food would run out before you got money to buy more?: No     Within the past 12 months, did the food you bought just not last and you didn t have money to get more?: No   Transportation Needs: Low Risk  (1/30/2024)    Transportation Needs     Within the past 12 months, has lack of transportation kept you from medical appointments, getting your medicines, non-medical meetings or appointments, work, or from getting things that you need?: No   Interpersonal Safety: Low Risk  (1/30/2024)    Interpersonal Safety     Do you feel physically and emotionally safe where you currently live?: Yes     Within the past 12 months, have you been hit,  "slapped, kicked or otherwise physically hurt by someone?: No     Within the past 12 months, have you been humiliated or emotionally abused in other ways by your partner or ex-partner?: No   Housing Stability: Low Risk  (1/30/2024)    Housing Stability     Do you have housing? : Yes     Are you worried about losing your housing?: No       Review of Systems:  Skin:        Eyes:  Positive for glasses  ENT:       Respiratory:  Negative shortness of breath;dyspnea on exertion;cough;wheezing  Cardiovascular:  Negative;dizziness;lightheadedness;syncope or near-syncope;chest pain;edema;fatigue Positive for;palpitations  Gastroenterology: Positive for reflux  Genitourinary:       Musculoskeletal:  Positive for arthritis  Neurologic:       Psychiatric:       Heme/Lymph/Imm:  Negative allergies  Endocrine:  Positive for diabetes    Physical Exam:  Vitals: /70   Pulse 57   Ht 1.803 m (5' 11\")   Wt 96.8 kg (213 lb 8 oz)   SpO2 97%   BMI 29.78 kg/m       GEN:  NAD  NECK: No JVD  C/V:  Regular rate and rhythm, no murmur, rub or gallop.  RESP: Clear to auscultation bilaterally without wheezing, rales, or rhonchi.  GI: Abdomen soft, nontender, nondistended.   EXTREM: No pitting LE edema.   NEURO: Alert and oriented, cooperative. No obvious focal deficits.   PSYCH: Normal affect.  SKIN: Warm and dry.       Recent Lab Results:  LIPID RESULTS:  Lab Results   Component Value Date    CHOL 103 12/28/2023    CHOL 110 10/26/2020    HDL 28 (L) 12/28/2023    HDL 41 10/26/2020    LDL 51 12/28/2023    LDL 39 10/26/2020    TRIG 121 12/28/2023    TRIG 148 10/26/2020    CHOLHDLRATIO 3.3 06/26/2015       LIVER ENZYME RESULTS:  Lab Results   Component Value Date    AST 29 01/12/2024    AST 26 06/21/2019    ALT 30 01/12/2024    ALT 33 08/17/2020       CBC RESULTS:  Lab Results   Component Value Date    WBC 6.4 01/12/2024    WBC 7.1 03/03/2021    RBC 5.54 01/12/2024    RBC 5.09 03/03/2021    HGB 16.8 01/12/2024    HGB 15.5 03/03/2021    HCT " 50.1 01/12/2024    HCT 45.3 03/03/2021    MCV 90 01/12/2024    MCV 89 03/03/2021    MCH 30.3 01/12/2024    MCH 30.5 03/03/2021    MCHC 33.5 01/12/2024    MCHC 34.2 03/03/2021    RDW 12.6 01/12/2024    RDW 13.4 03/03/2021     01/12/2024     03/03/2021       BMP RESULTS:  Lab Results   Component Value Date     01/12/2024     03/03/2021    POTASSIUM 4.7 01/12/2024    POTASSIUM 4.1 09/14/2021    POTASSIUM 4.0 03/03/2021    CHLORIDE 105 01/12/2024    CHLORIDE 106 09/14/2021    CHLORIDE 107 03/03/2021    CO2 25 01/12/2024    CO2 25 09/14/2021    CO2 23 03/03/2021    ANIONGAP 11 01/12/2024    ANIONGAP 5 09/14/2021    ANIONGAP 9 03/03/2021     (H) 01/12/2024    GLC 92 12/30/2023     (H) 09/14/2021    GLC 93 03/03/2021    BUN 23.0 01/12/2024    BUN 24 09/14/2021    BUN 23 03/03/2021    CR 1.26 (H) 01/12/2024    CR 1.19 03/03/2021    GFRESTIMATED 63 01/12/2024    GFRESTIMATED 64 03/03/2021    GFRESTBLACK 75 03/03/2021    LES 9.9 01/12/2024    LES 10.3 (H) 03/03/2021        A1C RESULTS:  Lab Results   Component Value Date    A1C 6.4 (H) 12/28/2023    A1C 5.7 (H) 08/17/2020       INR RESULTS:  Lab Results   Component Value Date    INR 1.03 12/28/2023    INR 0.95 08/05/2010           Tristen Padilla PA-C  February 15, 2024

## 2024-02-15 NOTE — LETTER
2/15/2024    Ric Villela MD, MD  [address]  [place] [address]    RE: Manan Bryant       Dear Colleague,     I had the pleasure of seeing Manan Bryant in the [facility].  Primary Cardiologist: Dr. Corley    Reason for Visit: Annual follow up and discussion of holding plavix/xarelto for upcoming EGD procedure for pancreatic mass     History of Present Illness:   Daquan is a very pleasant 68 year old male with past medical history notable for CAD (hx of inferior MI in 8/2010; s/p aspiration thrombectomy and ELLIOTT to LAD and more recently he was ruled in for NSTEMI and received ELLIOTT to prox  RCA on 12/29/2023; on plavix therapy), recent episode of atrial fibrillation RVR in the setting of acute pancreatitis (on xarelto due to high CHADSVASC score; TSH nl; echo showed normal atrial sizes; recent ambulatory monitor showed no recurrent A fib- likely provoked from acute event; has follow up with EP to discuss this and recent NSVT episodes), NSVT/PVC's (managed on metoprolol), recent hx of pancreatic mass (needs EGD with biopsy for further evaluation of this- currently scheduled on 3/12), hyperlipidemia, and hypertension.     Daquan reports no new symptoms today. He is scheduled for EGD with biopsy on 3/12 and wonders if he can hold his plavix and xarelto. He is also concerned about his episodes of NSVT noted on recent ambulatory monitor. His LVEF is preserved per recent echo a few months ago. He has no symptoms of palpitations, CP, SOB, lightheadedness, or syncope. He was recommended to increase his metoprolol on discharge but he was nervous about his nighttime HRs dipping more, so he has tartrate form in addition as a pill in the pocket regimen. He has not needed to take any additional metoprolol since his admission in 12/2023. He has had no bleeding issues with plavix and xarelto.     Assessment and Plan:  Daquan is a pleasant 68 year old male with past medical history notable  for:     # NSTEMI in 12/2023, s/p ELLIOTT to  RCA  -- on plavic therapy   -- no recurrent angina   -- it sounds like his symptoms were more from his acute pancreatits     # Hx of CAD   -- hx of inferior MI in 2018, receiving ELLIOTT to LAD  -- on plavix, statin, and BB   -- LVEF normal   -- risk factors under good control     # Recent Hx of PAF  -- RVR in the setting of acute pancreatitis   -- no recurrence per recent ambulatory monitor   -- echo with normal atrial sizes/LVEF/valve fxn; TSH nl  -- likely provoked from acute event  -- currently on Xarelto therapy but I suspect this will be discontinued during his upcoming EP visit     # Recently found Pancreatic Mass   -- follows with [facility] and is referred to pancreas clinic  -- he is scheduled for EGD with bx on 3/12  -- he needs to hold his plavix and xarelto- I will check with Dr. Corley regarding the recommended timeline for this     # NSVT/PVC's  -- recent ambulatory monitor showed 10-beat VT run   -- on BB  -- normal LVEF  -- pt concerned about this and would like to see EP team    # HTN  -- controlled     # HLD   -- at goal     45 minutes spent on the date of the encounter with chart review, patient visit, care coordination, and documentation.      This note was completed in part using Dragon voice recognition software. Although reviewed after completion, some word and grammatical errors may occur.    Orders this Visit:  No orders of the defined types were placed in this encounter.    Orders Placed This Encounter   Medications    losartan (COZAAR) 100 MG tablet     Sig: Take 1 tablet (100 mg) by mouth daily     Dispense:  90 tablet     Refill:  3    metoprolol succinate ER (TOPROL XL) 50 MG 24 hr tablet     Sig: Take 1 tablet (50 mg) by mouth daily     Dispense:  90 tablet     Refill:  3    metoprolol tartrate (LOPRESSOR) 25 MG tablet     Sig: Take 1 tablet (25 mg) by mouth 2 times daily as needed (a fib with tachycardia (rates >110 at rest))     Dispense:  180  tablet     Refill:  3    rosuvastatin (CRESTOR) 40 MG tablet     Sig: Take 1 tablet (40 mg) by mouth daily     Dispense:  90 tablet     Refill:  3     Medications Discontinued During This Encounter   Medication Reason    metoprolol tartrate (LOPRESSOR) 25 MG tablet Reorder (No AVS)    metoprolol succinate ER (TOPROL XL) 50 MG 24 hr tablet Reorder (No AVS)    losartan (COZAAR) 100 MG tablet Reorder (No AVS)    rosuvastatin (CRESTOR) 40 MG tablet Reorder (No AVS)         Encounter Diagnoses   Name Primary?    Benign essential hypertension     Coronary artery disease involving native coronary artery of native heart without angina pectoris     Hyperlipidemia LDL goal <70        CURRENT MEDICATIONS:  Current Outpatient Medications   Medication Sig Dispense Refill    Cholecalciferol (VITAMIN D PO) Take 1,000 mg by mouth daily       clopidogrel (PLAVIX) 75 MG tablet Take 1 tablet (75 mg) by mouth daily 90 tablet 3    dapagliflozin (FARXIGA) 10 MG TABS tablet Take 10 mg by mouth daily      losartan (COZAAR) 100 MG tablet Take 1 tablet (100 mg) by mouth daily 90 tablet 3    metoprolol succinate ER (TOPROL XL) 50 MG 24 hr tablet Take 1 tablet (50 mg) by mouth daily 90 tablet 3    metoprolol tartrate (LOPRESSOR) 25 MG tablet Take 1 tablet (25 mg) by mouth 2 times daily as needed (a fib with tachycardia (rates >110 at rest)) 180 tablet 3    metroNIDAZOLE (METROGEL) 0.75 % external gel Apply topically 2 times daily 45 g 3    Multiple Vitamin (MULTI-VITAMIN PO) Take by mouth daily       mupirocin (BACTROBAN) 2 % external ointment Apply topically daily 15 g 1    nitroGLYcerin (NITROSTAT) 0.4 MG sublingual tablet Place 1 tablet (0.4 mg) under the tongue every 5 minutes as needed for chest pain 25 tablet 3    omeprazole (PRILOSEC) 20 MG DR capsule Take 1 capsule (20 mg) by mouth daily 90 capsule 0    rivaroxaban ANTICOAGULANT (XARELTO) 20 MG TABS tablet Take 1 tablet (20 mg) by mouth daily (with dinner) 90 tablet 3    rosuvastatin  (CRESTOR) 40 MG tablet Take 1 tablet (40 mg) by mouth daily 90 tablet 3    hydrochlorothiazide (HYDRODIURIL) 25 MG tablet Take 1 tablet (25 mg) by mouth daily (Patient not taking: Reported on 1/30/2024) 90 tablet 4       ALLERGIES   No Known Allergies    PAST MEDICAL HISTORY:  Past Medical History:   Diagnosis Date    CAD (coronary artery disease)     cardiac cath 8/5/2010: ELLIOTT to LAD    Essential hypertension, benign     Family history of early CAD     History of acute anterior wall MI 2010 2010    History of colonic polyps     Mixed hyperlipidemia     Myocardial infarction (H)     Paroxysmal supraventricular tachycardia     PVC (premature ventricular contraction)     Hx ventricular tachycardia during cardiac rehab 2010    SVT (supraventricular tachycardia)     Syncope     episode 2016 - EP study - loop recorder implanted 2016 - non-functional after 3 years       PAST SURGICAL HISTORY:  Past Surgical History:   Procedure Laterality Date    COLONOSCOPY      CV CORONARY ANGIOGRAM N/A 12/29/2023    Procedure: Coronary Angiogram;  Surgeon: Edson Benson MD;  Location: [facility] CARDIAC CATH LAB    CV PCI N/A 12/29/2023    Procedure: Percutaneous Coronary Intervention;  Surgeon: Edson Benson MD;  Location: [facility] CARDIAC CATH LAB    STENT, CORONARY, EMEKA  8/2010    LAD       FAMILY HISTORY:  Family History   Problem Relation Age of Onset    Coronary Artery Disease Father     Myocardial Infarction Father     Coronary Artery Disease Brother     Myocardial Infarction Brother     Heart Surgery Brother         bypass    Coronary Artery Disease Paternal Grandfather     Myocardial Infarction Paternal Grandfather     Sleep Apnea Sister     Family History Negative Mother     Family History Negative Maternal Grandmother     Heart Failure Maternal Grandfather     Family History Negative Paternal Grandmother     Myocardial Infarction Brother     Coronary Artery Disease Brother     Heart Surgery Brother          bypass    Coronary Artery Disease Brother     Obesity Sister     Obesity Brother     Obesity Brother        SOCIAL HISTORY:  Social History     Socioeconomic History    Marital status:      Spouse name: None    Number of children: None    Years of education: None    Highest education level: None   Tobacco Use    Smoking status: Never    Smokeless tobacco: Never   Substance and Sexual Activity    Alcohol use: No     Alcohol/week: 0.0 standard drinks of alcohol    Drug use: No    Sexual activity: Not Currently     Partners: Female     Birth control/protection: Male Surgical   Other Topics Concern    Parent/sibling w/ CABG, MI or angioplasty before 65F 55M? Yes     Comment: Both brothers - one in his early 40's.  Both have repeated.    Caffeine Concern No     Comment: decaf: 1-2 cups a day. Diek coke: 4-5 cans a day    Sleep Concern No    Stress Concern No    Weight Concern No    Special Diet Yes     Comment: limited, heart healthy    Exercise Yes     Comment: bike outside, 1-3x a week    Bike Helmet Yes    Seat Belt Yes   Social History Narrative    ,    Retired - continues      Social Determinants of Health     Financial Resource Strain: Low Risk  (1/30/2024)    Financial Resource Strain     Within the past 12 months, have you or your family members you live with been unable to get utilities (heat, electricity) when it was really needed?: No   Food Insecurity: Low Risk  (1/30/2024)    Food Insecurity     Within the past 12 months, did you worry that your food would run out before you got money to buy more?: No     Within the past 12 months, did the food you bought just not last and you didn t have money to get more?: No   Transportation Needs: Low Risk  (1/30/2024)    Transportation Needs     Within the past 12 months, has lack of transportation kept you from medical appointments, getting your medicines, non-medical meetings or appointments, work, or from getting things that you  "need?: No   Interpersonal Safety: Low Risk  (1/30/2024)    Interpersonal Safety     Do you feel physically and emotionally safe where you currently live?: Yes     Within the past 12 months, have you been hit, slapped, kicked or otherwise physically hurt by someone?: No     Within the past 12 months, have you been humiliated or emotionally abused in other ways by your partner or ex-partner?: No   Housing Stability: Low Risk  (1/30/2024)    Housing Stability     Do you have housing? : Yes     Are you worried about losing your housing?: No       Review of Systems:  Skin:        Eyes:  Positive for glasses  ENT:       Respiratory:  Negative shortness of breath;dyspnea on exertion;cough;wheezing  Cardiovascular:  Negative;dizziness;lightheadedness;syncope or near-syncope;chest pain;edema;fatigue Positive for;palpitations  Gastroenterology: Positive for reflux  Genitourinary:       Musculoskeletal:  Positive for arthritis  Neurologic:       Psychiatric:       Heme/Lymph/Imm:  Negative allergies  Endocrine:  Positive for diabetes    Physical Exam:  Vitals: /70   Pulse 57   Ht 1.803 m (5' 11\")   Wt 96.8 kg (213 lb 8 oz)   SpO2 97%   BMI 29.78 kg/m       GEN:  NAD  NECK: No JVD  C/V:  Regular rate and rhythm, no murmur, rub or gallop.  RESP: Clear to auscultation bilaterally without wheezing, rales, or rhonchi.  GI: Abdomen soft, nontender, nondistended.   EXTREM: No pitting LE edema.   NEURO: Alert and oriented, cooperative. No obvious focal deficits.   PSYCH: Normal affect.  SKIN: Warm and dry.       Recent Lab Results:  LIPID RESULTS:  Lab Results   Component Value Date    CHOL 103 12/28/2023    CHOL 110 10/26/2020    HDL 28 (L) 12/28/2023    HDL 41 10/26/2020    LDL 51 12/28/2023    LDL 39 10/26/2020    TRIG 121 12/28/2023    TRIG 148 10/26/2020    CHOLHDLRATIO 3.3 06/26/2015       LIVER ENZYME RESULTS:  Lab Results   Component Value Date    AST 29 01/12/2024    AST 26 06/21/2019    ALT 30 01/12/2024    ALT 33 " 08/17/2020       CBC RESULTS:  Lab Results   Component Value Date    WBC 6.4 01/12/2024    WBC 7.1 03/03/2021    RBC 5.54 01/12/2024    RBC 5.09 03/03/2021    HGB 16.8 01/12/2024    HGB 15.5 03/03/2021    HCT 50.1 01/12/2024    HCT 45.3 03/03/2021    MCV 90 01/12/2024    MCV 89 03/03/2021    MCH 30.3 01/12/2024    MCH 30.5 03/03/2021    MCHC 33.5 01/12/2024    MCHC 34.2 03/03/2021    RDW 12.6 01/12/2024    RDW 13.4 03/03/2021     01/12/2024     03/03/2021       BMP RESULTS:  Lab Results   Component Value Date     01/12/2024     03/03/2021    POTASSIUM 4.7 01/12/2024    POTASSIUM 4.1 09/14/2021    POTASSIUM 4.0 03/03/2021    CHLORIDE 105 01/12/2024    CHLORIDE 106 09/14/2021    CHLORIDE 107 03/03/2021    CO2 25 01/12/2024    CO2 25 09/14/2021    CO2 23 03/03/2021    ANIONGAP 11 01/12/2024    ANIONGAP 5 09/14/2021    ANIONGAP 9 03/03/2021     (H) 01/12/2024    GLC 92 12/30/2023     (H) 09/14/2021    GLC 93 03/03/2021    BUN 23.0 01/12/2024    BUN 24 09/14/2021    BUN 23 03/03/2021    CR 1.26 (H) 01/12/2024    CR 1.19 03/03/2021    GFRESTIMATED 63 01/12/2024    GFRESTIMATED 64 03/03/2021    GFRESTBLACK 75 03/03/2021    LES 9.9 01/12/2024    LES 10.3 (H) 03/03/2021        A1C RESULTS:  Lab Results   Component Value Date    A1C 6.4 (H) 12/28/2023    A1C 5.7 (H) 08/17/2020       INR RESULTS:  Lab Results   Component Value Date    INR 1.03 12/28/2023    INR 0.95 08/05/2010           Tristen Padilla PA-C  February 15, 2024       Thank you for allowing me to participate in the care of your patient.      Sincerely,     Tristen Padilla PA-C     [facility]  cc:   Tristen Padilla PA-C  [address] [place] [address]

## 2024-02-16 ENCOUNTER — HOSPITAL ENCOUNTER (OUTPATIENT)
Dept: CARDIAC REHAB | Facility: CLINIC | Age: 69
Discharge: HOME | End: 2024-02-16
Attending: INTERNAL MEDICINE
Payer: MEDICARE

## 2024-02-16 PROCEDURE — 93798 PHYS/QHP OP CAR RHAB W/ECG: CPT

## 2024-02-20 ENCOUNTER — HOSPITAL ENCOUNTER (OUTPATIENT)
Dept: CARDIAC REHAB | Facility: CLINIC | Age: 69
Discharge: HOME | End: 2024-02-20
Attending: INTERNAL MEDICINE
Payer: MEDICARE

## 2024-02-20 PROCEDURE — 93798 PHYS/QHP OP CAR RHAB W/ECG: CPT

## 2024-02-27 ENCOUNTER — MYC MEDICAL ADVICE (OUTPATIENT)
Dept: CARDIOLOGY | Facility: CLINIC | Age: 69
End: 2024-02-27
Payer: MEDICARE

## 2024-02-27 DIAGNOSIS — I10 BENIGN ESSENTIAL HYPERTENSION: Primary | ICD-10-CM

## 2024-02-27 NOTE — TELEPHONE ENCOUNTER
My Chart message for review - Medication questions and BP. Wondering what to do?    New EP Dr. Wooten OV 3-7-24. .    Last Tristen OV 2-15-24 - Annual follow up and discussion of holding plavix/xarelto for upcoming EGD procedure for pancreatic mass    # NSVT/PVC's  -- recent ambulatory monitor showed 10-beat VT run   -- on BB  -- normal LVEF  -- pt concerned about this and would like to see EP team     # HTN  -- controlled     Last Dr. Corley OV 1-3-23 - Blood pressure is excellent today at 115/73, but given his home blood pressures and possible masked hypertension, I will increase his hydrochlorothiazide from 12.5 mg daily to 25 mg daily and have him return in 3 months. I will have him follow up with my TYREL with a blood pressure check and we will check a BMP At that time. He also asked that we recheck hemoglobin A1c.

## 2024-02-28 RX ORDER — TRIAMTERENE AND HYDROCHLOROTHIAZIDE 37.5; 25 MG/1; MG/1
1 CAPSULE ORAL EVERY MORNING
Qty: 90 CAPSULE | Refills: 1 | Status: SHIPPED | OUTPATIENT
Start: 2024-02-28

## 2024-02-28 NOTE — TELEPHONE ENCOUNTER
Elvi, Marcus Puentes MD  You5 minutes ago (4:15 PM)     Let s have him do Dyazide 37.5/25 instead. It s a combination of potassium sparing diuretic and HCTZ so he doesn t get hypokemic.       Spoke to patient and reviewed recommendations from Dr Boles. He verbalized understanding and was agreeable to plan. Rx sent to pharmacy. Pt will continue to monitor BP.    Regarding PVC's recommended discussing further with Dr Urbina at visit next week. He agreed with plan.

## 2024-02-29 NOTE — TELEPHONE ENCOUNTER
"Message from Dr Corley- \"Check a BMP a couple of days prior to his pancreatic biopsy.\"     Order placed. Mismi message sent to patient to schedule.   "

## 2024-03-01 ENCOUNTER — HOSPITAL ENCOUNTER (OUTPATIENT)
Dept: CARDIAC REHAB | Facility: CLINIC | Age: 69
Discharge: HOME | End: 2024-03-01
Attending: INTERNAL MEDICINE
Payer: MEDICARE

## 2024-03-01 PROCEDURE — 93798 PHYS/QHP OP CAR RHAB W/ECG: CPT | Performed by: OCCUPATIONAL THERAPIST

## 2024-03-04 ENCOUNTER — MYC MEDICAL ADVICE (OUTPATIENT)
Dept: FAMILY MEDICINE | Facility: CLINIC | Age: 69
End: 2024-03-04
Payer: MEDICARE

## 2024-03-04 DIAGNOSIS — G47.34 NOCTURNAL HYPOXIA: ICD-10-CM

## 2024-03-04 DIAGNOSIS — G47.30 SLEEP APNEA, UNSPECIFIED TYPE: Primary | ICD-10-CM

## 2024-03-06 ENCOUNTER — HOSPITAL ENCOUNTER (OUTPATIENT)
Dept: CARDIAC REHAB | Facility: CLINIC | Age: 69
Discharge: HOME | End: 2024-03-06
Attending: INTERNAL MEDICINE
Payer: MEDICARE

## 2024-03-06 PROCEDURE — 93798 PHYS/QHP OP CAR RHAB W/ECG: CPT

## 2024-03-07 ENCOUNTER — OFFICE VISIT (OUTPATIENT)
Dept: CARDIOLOGY | Facility: CLINIC | Age: 69
End: 2024-03-07
Attending: INTERNAL MEDICINE
Payer: MEDICARE

## 2024-03-07 VITALS
SYSTOLIC BLOOD PRESSURE: 135 MMHG | HEIGHT: 71 IN | DIASTOLIC BLOOD PRESSURE: 78 MMHG | OXYGEN SATURATION: 95 % | BODY MASS INDEX: 30.1 KG/M2 | WEIGHT: 215 LBS | HEART RATE: 100 BPM

## 2024-03-07 DIAGNOSIS — I48.0 PAROXYSMAL ATRIAL FIBRILLATION (H): ICD-10-CM

## 2024-03-07 DIAGNOSIS — I25.10 CORONARY ARTERY DISEASE INVOLVING NATIVE CORONARY ARTERY OF NATIVE HEART WITHOUT ANGINA PECTORIS: ICD-10-CM

## 2024-03-07 DIAGNOSIS — I47.29 NSVT (NONSUSTAINED VENTRICULAR TACHYCARDIA) (H): Primary | ICD-10-CM

## 2024-03-07 PROCEDURE — 99214 OFFICE O/P EST MOD 30 MIN: CPT | Performed by: INTERNAL MEDICINE

## 2024-03-07 PROCEDURE — 93000 ELECTROCARDIOGRAM COMPLETE: CPT | Performed by: INTERNAL MEDICINE

## 2024-03-07 NOTE — PROGRESS NOTES
"PHYSICIAN NOTE:  This visit was completed in person at the [facility].      I had the pleasure of seeing Mr. Daquan Bryant for evaluation of atrial fibrillation and NSVT. He has been referred by Dr. Lito Corley. I previously evaluated the patient in 2016 for an episode of near syncope while driving. After a \"negative\" EP study, a loop recorder was implanted.  It did not reveal significant arrhythmias before battery depletion about four years later.    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    The patient was admitted at the hospital on Sebas day 2023 with epigastric/left chest discomfort, mildly elevated pancreatic enzymes and CT findings suggesting inflammation in the tail of the pancreas. On 12/28/2023 he returned to the ER with palpitation and was found to be in AF with RVR. Troponins were mildly elevated, cardiac cath followed showing RCA . This was treated with a stent, see above. The AF event spontaneously terminated within 12 hours.    The patient has not recently had syncope, chest pain, orthopnea/PND or other cardiovascular symptoms.    The patient is a non-smoker and does not drink much alcohol. He lives with his wife in [place].   Family history is significant for his father dying of VT cardiac arrest at 68.      PHYSICAL EXAMINATION:  Vital signs: 135/78, 100, 97.5 kg, BMI 30  General:  in no apparent distress  ENT/Mouth:  no nasal discharge.  Eyes:  normal conjunctivae.   Neck:  no thyromegaly or lymphadenopathy.  Chest/Lungs:  patient is not dyspneic.  Lungs " CTA, without rales or wheezing  Cardiovascular: normal JVP, rhythm is regular.  No gallop, murmur or rub.    Abdomen:  no abdominal distention.  Soft, negative HJR.    Extremities:  no edema  Skin:  no xanthelasma.    Neurologic:  alert & oriented x 3.  No tremor.    Vascular:  2+ carotids without bruits.  2+ radials.        DIAGNOSTIC STUDIES:  Laboratory studies: sodium 141, potassium 4.7, creatinine 1.26, calcium 9.9, TSH 4.13, hemoglobin 16.8, platelets 343K.  ECG: SR, low voltage  Echocardiogram (12/2023): EF 55 - 60%, normal RV, normal atrial size, no significant valve abnormalities  Cardiac MRI (11/2022): EF 60%, small subendocardial apical scar in the LAD distribution.      IMPRESSION:  Asymptomatic NSVT.  Small apical scar by cardiac MRI in 2022.  In the setting of normal LV function this finding dis not particularly concerning. I reassured him. He is already on a beta-blocker. Nothing further is indicated.  Single known episode of AF with RVR. High probability of future AF events. [id number] is at least 3. Chronic anticoagulation is reasonable, in my opinion. If/when he has further symptomatic events, we could discuss AA drug therapy vs ablation.  Depleted ILR in place. The battery reached EOS several years ago. The patient knows that we can remove at any time, but this is not necessary.    RECOMMENDATIONS:  Reassurance.   Continue current medications, including rivaroxaban and metoprolol.  Follow-up with EP as needed.    It was my pleasure seeing this delightful patient.  Please feel free to call with any questions.      Romulo Wooten MD, FACC      (Chart documentation was completed, in part, using Dragon voice-recognition software. The note was reviewed, however grammatical and spelling errors may be present.)      CURRENT MEDICATIONS:  Current Outpatient Medications   Medication Sig Dispense Refill    Cholecalciferol (VITAMIN D PO) Take 1,000 mg by mouth daily       clopidogrel (PLAVIX) 75 MG  tablet Take 1 tablet (75 mg) by mouth daily 90 tablet 3    dapagliflozin (FARXIGA) 10 MG TABS tablet Take 10 mg by mouth daily      losartan (COZAAR) 100 MG tablet Take 1 tablet (100 mg) by mouth daily 90 tablet 3    metoprolol succinate ER (TOPROL XL) 50 MG 24 hr tablet Take 1 tablet (50 mg) by mouth daily 90 tablet 3    metoprolol tartrate (LOPRESSOR) 25 MG tablet Take 1 tablet (25 mg) by mouth 2 times daily as needed (a fib with tachycardia (rates >110 at rest)) 180 tablet 3    metroNIDAZOLE (METROGEL) 0.75 % external gel Apply topically 2 times daily 45 g 3    Multiple Vitamin (MULTI-VITAMIN PO) Take by mouth daily       mupirocin (BACTROBAN) 2 % external ointment Apply topically daily 15 g 1    nitroGLYcerin (NITROSTAT) 0.4 MG sublingual tablet Place 1 tablet (0.4 mg) under the tongue every 5 minutes as needed for chest pain 25 tablet 3    omeprazole (PRILOSEC) 20 MG DR capsule Take 1 capsule (20 mg) by mouth daily 90 capsule 0    rivaroxaban ANTICOAGULANT (XARELTO) 20 MG TABS tablet Take 1 tablet (20 mg) by mouth daily (with dinner) 90 tablet 3    rosuvastatin (CRESTOR) 40 MG tablet Take 1 tablet (40 mg) by mouth daily 90 tablet 3    triamterene-HCTZ (DYAZIDE) 37.5-25 MG capsule Take 1 capsule by mouth every morning 90 capsule 1       ALLERGIES   No Known Allergies    PAST MEDICAL HISTORY:  Past Medical History:   Diagnosis Date    CAD (coronary artery disease)     cardiac cath 8/5/2010: ELLIOTT to LAD    Essential hypertension, benign     Family history of early CAD     History of acute anterior wall MI 2010 2010    History of colonic polyps     Mixed hyperlipidemia     Myocardial infarction (H)     Paroxysmal supraventricular tachycardia     PVC (premature ventricular contraction)     Hx ventricular tachycardia during cardiac rehab 2010    SVT (supraventricular tachycardia)     Syncope     episode 2016 - EP study - loop recorder implanted 2016 - non-functional after 3 years       PAST SURGICAL HISTORY:  Past  Surgical History:   Procedure Laterality Date    COLONOSCOPY      CV CORONARY ANGIOGRAM N/A 12/29/2023    Procedure: Coronary Angiogram;  Surgeon: Edson Benson MD;  Location:  HEART CARDIAC CATH LAB    CV PCI N/A 12/29/2023    Procedure: Percutaneous Coronary Intervention;  Surgeon: Edson Benson MD;  Location: [facility] CARDIAC CATH LAB    STENT, CORONARY, EMEKA  8/2010    LAD       FAMILY HISTORY:  Family History   Problem Relation Age of Onset    Coronary Artery Disease Father     Myocardial Infarction Father     Coronary Artery Disease Brother     Myocardial Infarction Brother     Heart Surgery Brother         bypass    Coronary Artery Disease Paternal Grandfather     Myocardial Infarction Paternal Grandfather     Sleep Apnea Sister     Family History Negative Mother     Family History Negative Maternal Grandmother     Heart Failure Maternal Grandfather     Family History Negative Paternal Grandmother     Myocardial Infarction Brother     Coronary Artery Disease Brother     Heart Surgery Brother         bypass    Coronary Artery Disease Brother     Obesity Sister     Obesity Brother     Obesity Brother        SOCIAL HISTORY:  Social History     Socioeconomic History    Marital status:    Tobacco Use    Smoking status: Never    Smokeless tobacco: Never   Substance and Sexual Activity    Alcohol use: No     Alcohol/week: 0.0 standard drinks of alcohol    Drug use: No    Sexual activity: Not Currently     Partners: Female     Birth control/protection: Male Surgical   Other Topics Concern    Parent/sibling w/ CABG, MI or angioplasty before 65F 55M? Yes     Comment: Both brothers - one in his early 40's.  Both have repeated.    Caffeine Concern No     Comment: decaf: 1-2 cups a day. Diek coke: 4-5 cans a day    Sleep Concern No    Stress Concern No    Weight Concern No    Special Diet Yes     Comment: limited, heart healthy    Exercise Yes     Comment: bike outside, 1-3x a week    Bike Helmet  Yes    Seat Belt Yes   Social History Narrative    ,    Retired - continues      Social Determinants of Health     Financial Resource Strain: Low Risk  (1/30/2024)    Financial Resource Strain     Within the past 12 months, have you or your family members you live with been unable to get utilities (heat, electricity) when it was really needed?: No   Food Insecurity: Low Risk  (1/30/2024)    Food Insecurity     Within the past 12 months, did you worry that your food would run out before you got money to buy more?: No     Within the past 12 months, did the food you bought just not last and you didn t have money to get more?: No   Transportation Needs: Low Risk  (1/30/2024)    Transportation Needs     Within the past 12 months, has lack of transportation kept you from medical appointments, getting your medicines, non-medical meetings or appointments, work, or from getting things that you need?: No   Interpersonal Safety: Low Risk  (1/30/2024)    Interpersonal Safety     Do you feel physically and emotionally safe where you currently live?: Yes     Within the past 12 months, have you been hit, slapped, kicked or otherwise physically hurt by someone?: No     Within the past 12 months, have you been humiliated or emotionally abused in other ways by your partner or ex-partner?: No   Housing Stability: Low Risk  (1/30/2024)    Housing Stability     Do you have housing? : Yes     Are you worried about losing your housing?: No       Review of Systems:  Skin:          Eyes:         ENT:         Respiratory:          Cardiovascular:         Gastroenterology:        Genitourinary:         Musculoskeletal:         Neurologic:         Psychiatric:         Heme/Lymph/Imm:         Endocrine:           Physical Exam:  Vitals: There were no vitals taken for this visit.    Constitutional:           Skin:             Head:           Eyes:           Lymph:      ENT:           Neck:           Respiratory:             Cardiac:                                                           GI:           Extremities and Muscular Skeletal:                 Neurological:           Psych:           CC  Jalen Grove MD  [address] [place] [address]

## 2024-03-07 NOTE — LETTER
"3/7/2024    Ric Villela MD, MD  [address]  [place] [address]    RE: Manan GOODRICH Annette       Dear Colleague,     I had the pleasure of seeing Manan Bryant in the [facility].  PHYSICIAN NOTE:  This visit was completed in person at the [facility].      I had the pleasure of seeing Mr. Daquan Bryant for evaluation of atrial fibrillation and NSVT. He has been referred by Dr. Lito Corley. I previously evaluated the patient in 2016 for an episode of near syncope while driving. After a \"negative\" EP study, a loop recorder was implanted.  It did not reveal significant arrhythmias before battery depletion about four years later.    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    The patient was admitted at the hospital on Sebas day 2023 with epigastric/left chest discomfort, mildly elevated pancreatic enzymes and CT findings suggesting inflammation in the tail of the pancreas. On 12/28/2023 he returned to the ER with palpitation and was found to be in AF with RVR. Troponins were mildly elevated, cardiac cath followed showing RCA . This was treated with a stent, see above. The AF event spontaneously terminated within 12 hours.    The patient has not recently had syncope, chest pain, orthopnea/PND or other cardiovascular symptoms.    The patient is a non-smoker and does not drink much alcohol. He lives with his wife in [place].   Family history is significant for his father dying of VT cardiac arrest at 68.      PHYSICAL EXAMINATION:  Vital signs: " 135/78, 100, 97.5 kg, BMI 30  General:  in no apparent distress  ENT/Mouth:  no nasal discharge.  Eyes:  normal conjunctivae.   Neck:  no thyromegaly or lymphadenopathy.  Chest/Lungs:  patient is not dyspneic.  Lungs CTA, without rales or wheezing  Cardiovascular: normal JVP, rhythm is regular.  No gallop, murmur or rub.    Abdomen:  no abdominal distention.  Soft, negative HJR.    Extremities:  no edema  Skin:  no xanthelasma.    Neurologic:  alert & oriented x 3.  No tremor.    Vascular:  2+ carotids without bruits.  2+ radials.        DIAGNOSTIC STUDIES:  Laboratory studies: sodium 141, potassium 4.7, creatinine 1.26, calcium 9.9, TSH 4.13, hemoglobin 16.8, platelets 343K.  ECG: SR, low voltage  Echocardiogram (12/2023): EF 55 - 60%, normal RV, normal atrial size, no significant valve abnormalities  Cardiac MRI (11/2022): EF 60%, small subendocardial apical scar in the LAD distribution.      IMPRESSION:  Asymptomatic NSVT.  Small apical scar by cardiac MRI in 2022.  In the setting of normal LV function this finding dis not particularly concerning. I reassured him. He is already on a beta-blocker. Nothing further is indicated.  Single known episode of AF with RVR. High probability of future AF events. [id number] is at least 3. Chronic anticoagulation is reasonable, in my opinion. If/when he has further symptomatic events, we could discuss AA drug therapy vs ablation.  Depleted ILR in place. The battery reached EOS several years ago. The patient knows that we can remove at any time, but this is not necessary.    RECOMMENDATIONS:  Reassurance.   Continue current medications, including rivaroxaban and metoprolol.  Follow-up with EP as needed.    It was my pleasure seeing this delightful patient.  Please feel free to call with any questions.      Romulo Wooten MD, [facility]      (Chart documentation was completed, in part, using Dragon voice-recognition software. The note was reviewed, however grammatical and  spelling errors may be present.)      CURRENT MEDICATIONS:  Current Outpatient Medications   Medication Sig Dispense Refill    Cholecalciferol (VITAMIN D PO) Take 1,000 mg by mouth daily       clopidogrel (PLAVIX) 75 MG tablet Take 1 tablet (75 mg) by mouth daily 90 tablet 3    dapagliflozin (FARXIGA) 10 MG TABS tablet Take 10 mg by mouth daily      losartan (COZAAR) 100 MG tablet Take 1 tablet (100 mg) by mouth daily 90 tablet 3    metoprolol succinate ER (TOPROL XL) 50 MG 24 hr tablet Take 1 tablet (50 mg) by mouth daily 90 tablet 3    metoprolol tartrate (LOPRESSOR) 25 MG tablet Take 1 tablet (25 mg) by mouth 2 times daily as needed (a fib with tachycardia (rates >110 at rest)) 180 tablet 3    metroNIDAZOLE (METROGEL) 0.75 % external gel Apply topically 2 times daily 45 g 3    Multiple Vitamin (MULTI-VITAMIN PO) Take by mouth daily       mupirocin (BACTROBAN) 2 % external ointment Apply topically daily 15 g 1    nitroGLYcerin (NITROSTAT) 0.4 MG sublingual tablet Place 1 tablet (0.4 mg) under the tongue every 5 minutes as needed for chest pain 25 tablet 3    omeprazole (PRILOSEC) 20 MG DR capsule Take 1 capsule (20 mg) by mouth daily 90 capsule 0    rivaroxaban ANTICOAGULANT (XARELTO) 20 MG TABS tablet Take 1 tablet (20 mg) by mouth daily (with dinner) 90 tablet 3    rosuvastatin (CRESTOR) 40 MG tablet Take 1 tablet (40 mg) by mouth daily 90 tablet 3    triamterene-HCTZ (DYAZIDE) 37.5-25 MG capsule Take 1 capsule by mouth every morning 90 capsule 1       ALLERGIES   No Known Allergies    PAST MEDICAL HISTORY:  Past Medical History:   Diagnosis Date    CAD (coronary artery disease)     cardiac cath 8/5/2010: ELLIOTT to LAD    Essential hypertension, benign     Family history of early CAD     History of acute anterior wall MI 2010 2010    History of colonic polyps     Mixed hyperlipidemia     Myocardial infarction (H)     Paroxysmal supraventricular tachycardia     PVC (premature ventricular contraction)     Hx  ventricular tachycardia during cardiac rehab 2010    SVT (supraventricular tachycardia)     Syncope     episode 2016 - EP study - loop recorder implanted 2016 - non-functional after 3 years       PAST SURGICAL HISTORY:  Past Surgical History:   Procedure Laterality Date    COLONOSCOPY      CV CORONARY ANGIOGRAM N/A 12/29/2023    Procedure: Coronary Angiogram;  Surgeon: Edson Benson MD;  Location: [facility] CARDIAC CATH LAB    CV PCI N/A 12/29/2023    Procedure: Percutaneous Coronary Intervention;  Surgeon: Edson Benson MD;  Location: [facility] CARDIAC CATH LAB    STENT, CORONARY, EMEKA  8/2010    LAD       FAMILY HISTORY:  Family History   Problem Relation Age of Onset    Coronary Artery Disease Father     Myocardial Infarction Father     Coronary Artery Disease Brother     Myocardial Infarction Brother     Heart Surgery Brother         bypass    Coronary Artery Disease Paternal Grandfather     Myocardial Infarction Paternal Grandfather     Sleep Apnea Sister     Family History Negative Mother     Family History Negative Maternal Grandmother     Heart Failure Maternal Grandfather     Family History Negative Paternal Grandmother     Myocardial Infarction Brother     Coronary Artery Disease Brother     Heart Surgery Brother         bypass    Coronary Artery Disease Brother     Obesity Sister     Obesity Brother     Obesity Brother        SOCIAL HISTORY:  Social History     Socioeconomic History    Marital status:    Tobacco Use    Smoking status: Never    Smokeless tobacco: Never   Substance and Sexual Activity    Alcohol use: No     Alcohol/week: 0.0 standard drinks of alcohol    Drug use: No    Sexual activity: Not Currently     Partners: Female     Birth control/protection: Male Surgical   Other Topics Concern    Parent/sibling w/ CABG, MI or angioplasty before 65F 55M? Yes     Comment: Both brothers - one in his early 40's.  Both have repeated.    Caffeine Concern No     Comment: decaf: 1-2  cups a day. Diek coke: 4-5 cans a day    Sleep Concern No    Stress Concern No    Weight Concern No    Special Diet Yes     Comment: limited, heart healthy    Exercise Yes     Comment: bike outside, 1-3x a week    Bike Helmet Yes    Seat Belt Yes   Social History Narrative    ,    Retired - continues      Social Determinants of Health     Financial Resource Strain: Low Risk  (1/30/2024)    Financial Resource Strain     Within the past 12 months, have you or your family members you live with been unable to get utilities (heat, electricity) when it was really needed?: No   Food Insecurity: Low Risk  (1/30/2024)    Food Insecurity     Within the past 12 months, did you worry that your food would run out before you got money to buy more?: No     Within the past 12 months, did the food you bought just not last and you didn t have money to get more?: No   Transportation Needs: Low Risk  (1/30/2024)    Transportation Needs     Within the past 12 months, has lack of transportation kept you from medical appointments, getting your medicines, non-medical meetings or appointments, work, or from getting things that you need?: No   Interpersonal Safety: Low Risk  (1/30/2024)    Interpersonal Safety     Do you feel physically and emotionally safe where you currently live?: Yes     Within the past 12 months, have you been hit, slapped, kicked or otherwise physically hurt by someone?: No     Within the past 12 months, have you been humiliated or emotionally abused in other ways by your partner or ex-partner?: No   Housing Stability: Low Risk  (1/30/2024)    Housing Stability     Do you have housing? : Yes     Are you worried about losing your housing?: No       Review of Systems:  Skin:          Eyes:         ENT:         Respiratory:          Cardiovascular:         Gastroenterology:        Genitourinary:         Musculoskeletal:         Neurologic:         Psychiatric:         Heme/Lymph/Imm:          Endocrine:           Physical Exam:  Vitals: There were no vitals taken for this visit.    Constitutional:           Skin:             Head:           Eyes:           Lymph:      ENT:           Neck:           Respiratory:            Cardiac:                                                           GI:           Extremities and Muscular Skeletal:                 Neurological:           Psych:           CC  Jalen Grove MD  [address] [place] [address]      Thank you for allowing me to participate in the care of your patient.      Sincerely,     Romulo Wooten MD     [facility]

## 2024-03-08 ENCOUNTER — LAB (OUTPATIENT)
Dept: LAB | Facility: CLINIC | Age: 69
End: 2024-03-08
Payer: MEDICARE

## 2024-03-08 ENCOUNTER — HOSPITAL ENCOUNTER (OUTPATIENT)
Dept: CARDIAC REHAB | Facility: CLINIC | Age: 69
Discharge: HOME | End: 2024-03-08
Attending: INTERNAL MEDICINE
Payer: MEDICARE

## 2024-03-08 DIAGNOSIS — I10 BENIGN ESSENTIAL HYPERTENSION: ICD-10-CM

## 2024-03-08 LAB
ANION GAP SERPL CALCULATED.3IONS-SCNC: 11 MMOL/L (ref 7–15)
BUN SERPL-MCNC: 25.7 MG/DL (ref 8–23)
CALCIUM SERPL-MCNC: 9.4 MG/DL (ref 8.8–10.2)
CHLORIDE SERPL-SCNC: 100 MMOL/L (ref 98–107)
CREAT SERPL-MCNC: 1.48 MG/DL (ref 0.67–1.17)
DEPRECATED HCO3 PLAS-SCNC: 27 MMOL/L (ref 22–29)
EGFRCR SERPLBLD CKD-EPI 2021: 52 ML/MIN/1.73M2
GLUCOSE SERPL-MCNC: 116 MG/DL (ref 70–99)
POTASSIUM SERPL-SCNC: 3.9 MMOL/L (ref 3.4–5.3)
SODIUM SERPL-SCNC: 138 MMOL/L (ref 135–145)

## 2024-03-08 PROCEDURE — 80048 BASIC METABOLIC PNL TOTAL CA: CPT

## 2024-03-08 PROCEDURE — 93798 PHYS/QHP OP CAR RHAB W/ECG: CPT | Performed by: REHABILITATION PRACTITIONER

## 2024-03-08 PROCEDURE — 36415 COLL VENOUS BLD VENIPUNCTURE: CPT

## 2024-03-11 ENCOUNTER — TELEPHONE (OUTPATIENT)
Dept: CARDIOLOGY | Facility: CLINIC | Age: 69
End: 2024-03-11
Payer: MEDICARE

## 2024-03-11 NOTE — TELEPHONE ENCOUNTER
Message from Dr. Corley re: Abbe Tobar MD  P [facility] Team 2  Potassium is okay.  BUN and creatinine are up a bit as expected given the fact that he was started on a diuretic and is probably a bit intravascular volume deplete.  I assume his blood pressure is okay.  Looks like it was fine when he saw Shahla.    0945 spoke with patient to review bmp results. Reviewed Dr. Corley's note and suggested that patient increase his water intake by 1-2 glasses per day.    Patient requested to read Dr. Corley's review and this was sent on his my chart accoutn.

## 2024-03-12 ENCOUNTER — HOSPITAL ENCOUNTER (OUTPATIENT)
Dept: CARDIAC REHAB | Facility: CLINIC | Age: 69
Discharge: HOME | End: 2024-03-12
Attending: INTERNAL MEDICINE
Payer: MEDICARE

## 2024-03-12 PROCEDURE — 93798 PHYS/QHP OP CAR RHAB W/ECG: CPT | Performed by: REHABILITATION PRACTITIONER

## 2024-03-15 ENCOUNTER — HOSPITAL ENCOUNTER (OUTPATIENT)
Dept: CARDIAC REHAB | Facility: CLINIC | Age: 69
Discharge: HOME | End: 2024-03-15
Attending: INTERNAL MEDICINE
Payer: MEDICARE

## 2024-03-15 PROCEDURE — 93798 PHYS/QHP OP CAR RHAB W/ECG: CPT

## 2024-03-19 ENCOUNTER — HOSPITAL ENCOUNTER (OUTPATIENT)
Dept: CARDIAC REHAB | Facility: CLINIC | Age: 69
Discharge: HOME | End: 2024-03-19
Attending: INTERNAL MEDICINE
Payer: MEDICARE

## 2024-03-19 PROCEDURE — 93798 PHYS/QHP OP CAR RHAB W/ECG: CPT

## 2024-03-22 ENCOUNTER — HOSPITAL ENCOUNTER (OUTPATIENT)
Dept: CARDIAC REHAB | Facility: CLINIC | Age: 69
Discharge: HOME | End: 2024-03-22
Attending: INTERNAL MEDICINE
Payer: MEDICARE

## 2024-03-22 PROCEDURE — 93798 PHYS/QHP OP CAR RHAB W/ECG: CPT | Performed by: OCCUPATIONAL THERAPIST

## 2024-03-26 ENCOUNTER — HOSPITAL ENCOUNTER (OUTPATIENT)
Dept: CARDIAC REHAB | Facility: CLINIC | Age: 69
Discharge: HOME | End: 2024-03-26
Attending: INTERNAL MEDICINE
Payer: MEDICARE

## 2024-03-26 PROCEDURE — 93798 PHYS/QHP OP CAR RHAB W/ECG: CPT

## 2024-03-27 ENCOUNTER — OFFICE VISIT (OUTPATIENT)
Dept: SLEEP MEDICINE | Facility: CLINIC | Age: 69
End: 2024-03-27
Attending: INTERNAL MEDICINE
Payer: MEDICARE

## 2024-03-27 VITALS
DIASTOLIC BLOOD PRESSURE: 86 MMHG | SYSTOLIC BLOOD PRESSURE: 123 MMHG | HEIGHT: 71 IN | WEIGHT: 213 LBS | OXYGEN SATURATION: 98 % | HEART RATE: 54 BPM | BODY MASS INDEX: 29.82 KG/M2

## 2024-03-27 DIAGNOSIS — G47.30 SLEEP APNEA, UNSPECIFIED TYPE: ICD-10-CM

## 2024-03-27 DIAGNOSIS — G47.34 NOCTURNAL HYPOXIA: ICD-10-CM

## 2024-03-27 DIAGNOSIS — G47.33 OBSTRUCTIVE SLEEP APNEA: Primary | ICD-10-CM

## 2024-03-27 PROCEDURE — 99215 OFFICE O/P EST HI 40 MIN: CPT | Performed by: STUDENT IN AN ORGANIZED HEALTH CARE EDUCATION/TRAINING PROGRAM

## 2024-03-27 ASSESSMENT — SLEEP AND FATIGUE QUESTIONNAIRES
HOW LIKELY ARE YOU TO NOD OFF OR FALL ASLEEP WHILE WATCHING TV: WOULD NEVER DOZE
HOW LIKELY ARE YOU TO NOD OFF OR FALL ASLEEP WHILE SITTING INACTIVE IN A PUBLIC PLACE: WOULD NEVER DOZE
HOW LIKELY ARE YOU TO NOD OFF OR FALL ASLEEP IN A CAR, WHILE STOPPED FOR A FEW MINUTES IN TRAFFIC: WOULD NEVER DOZE
HOW LIKELY ARE YOU TO NOD OFF OR FALL ASLEEP WHEN YOU ARE A PASSENGER IN A CAR FOR AN HOUR WITHOUT A BREAK: WOULD NEVER DOZE
HOW LIKELY ARE YOU TO NOD OFF OR FALL ASLEEP WHILE SITTING QUIETLY AFTER LUNCH WITHOUT ALCOHOL: WOULD NEVER DOZE
HOW LIKELY ARE YOU TO NOD OFF OR FALL ASLEEP WHILE LYING DOWN TO REST IN THE AFTERNOON WHEN CIRCUMSTANCES PERMIT: SLIGHT CHANCE OF DOZING
HOW LIKELY ARE YOU TO NOD OFF OR FALL ASLEEP WHILE SITTING AND READING: WOULD NEVER DOZE
HOW LIKELY ARE YOU TO NOD OFF OR FALL ASLEEP WHILE SITTING AND TALKING TO SOMEONE: WOULD NEVER DOZE

## 2024-03-27 NOTE — PATIENT INSTRUCTIONS
"MY INFORMATION ON SLEEP APNEA-  Manan Bryant    DOCTOR : Shalonda Shipley MD  SLEEP CENTER :      MY CONTACT NUMBER:    [facility]   [phone]  [facility]    Am I having a sleep study at a sleep center?  Make sure you have an appointment for the study before you leave!  [email or web address]          Frequently asked questions:  1. What is Obstructive Sleep Apnea (JYOTHI)? JYOTHI is the most common type of sleep apnea. Apnea means, \"without breath.\"  Apnea is most often caused by narrowing or collapse of the upper airway as muscles relax during sleep.   Almost everyone has occasional apneas. Most people with sleep apnea have had brief interruptions at night frequently for many years.  The severity of sleep apnea is related to how frequent and severe the events are.   Apneas are any events where there is no breathing.  Hypopneas are any events where there is shallow breathing. Sleep studies will track and then add all of the apneas and hypopneas into a total and then divided this number by the total time asleep to obtain the apnea hypopnea index(AHI). 0-5 events/per hour = No Sleep Apnea; 5-15 events/per hour = Mild Sleep Apnea; 15-30 events/per hour = Moderate Sleep Apnea; Greater than 30 events/per hour = Severe Sleep Apnea.  Sleep apnea is typically worse during REM sleep due to complete muscle relaxation, including the muscles of the airway. Sleep apnea is also typically worse during supine(sleeping on your back) sleep due to internal structures more easily falling on the top of the airway and external pressures more easily crushing the airway.  The respiratory disturbance index(RDI) includes apneas, hypopneas, and other respiratory events such as snoring that may disturb your sleep.  2. What are the consequences of JYOTHI? Symptoms include: feeling sleepy during the day, snoring loudly, gasping or " stopping of breathing, trouble sleeping, and occasionally morning headaches or heartburn at night.  Sleepiness can be serious and even increase the risk of falling asleep while driving. Other health consequences may include development of high blood pressure and other cardiovascular disease in persons who are susceptible. Untreated JYOTHI  can contribute to heart disease, stroke and diabetes.   3. What are the treatment options? In most situations, sleep apnea is a lifelong disease that must be managed with daily therapy. Medications are not effective for sleep apnea and surgery is generally not considered until other therapies have been tried. Your treatment is your choice . Continuous Positive Airway (CPAP) works right away and is the therapy that is effective in nearly everyone. An oral device to hold your jaw forward is usually the next most reliable option. Other options include postioning devices (to keep you off your back), weight loss, and surgery including a tongue pacing device. There is more detail about some of these options below.    Important tips for using CPAP and similar devices   Know your equipment:  CPAP is continuous positive airway pressure that prevents obstructive sleep apnea by keeping the throat from collapsing while you are sleeping. In most cases, the device is  smart  and can slowly self-adjusts if your throat collapses and keeps a record every day of how well you are treated-this information is available to you and your care team.  BPAP is bilevel positive airway pressure that keeps your throat open and also assists each breath with a pressure boost to maintain adequate breathing.  Special kinds of BPAP are used in patients who have inadequate breathing from lung or heart disease. In most cases, the device is  smart  and can slowly self-adjusts to assist breathing. Like CPAP, the device keeps a record of how well you are treated.  Your mask is your connection to the device. You get to  choose what feels most comfortable and the staff will help to make sure if fits. Here: are some examples of the different masks that are available:       Key points to remember on your journey with sleep apnea:  Sleep study.  PAP devices often need to be adjusted during a sleep study to show that they are effective and adjusted right.  Good tips to remember: Try wearing just the mask during a quiet time during the day so your body adapts to wearing it. A humidifier is recommended for comfort in most cases to prevent drying of your nose and throat. Allergy medication from your provider may help you if you are having nasal congestion.  Getting settled-in. It takes more than one night for most of us to get used to wearing a mask. Try wearing just the mask during a quiet time during the day so your body adapts to wearing it. A humidifier is recommended for comfort in most cases. Our team will work with you carefully on the first day and will be in contact within 4 days and again at 2 and 4 weeks for advice and remote device adjustments. Your therapy is evaluated by the device each day.   Use it every night. The more you are able to sleep naturally for 7-8 hours, the more likely you will have good sleep and to prevent health risks or symptoms from sleep apnea. Even if you use it 4 hours it helps. Occasionally all of us are unable to use a medical therapy, in sleep apnea, it is not dangerous to miss one night.   Communicate. Call our skilled team on the number provided on the first day if your visit for problems that make it difficult to wear the device. Over 2 out of 3 patients can learn to wear the device long-term with help from our team. Remember to call our team or your sleep providers if you are unable to wear the device as we may have other solutions for those who cannot adapt to mask CPAP therapy. It is recommended that you sleep your sleep provider within the first 3 months and yearly after that if you are not  having problems.   Take care of your equipment. Make sure you clean your mask and tubing using directions every day and that your filter and mask are replaced as recommended or if they are not working.     BESIDES CPAP, WHAT OTHER THERAPIES ARE THERE?    Positioning Device  Positioning devices are generally used when sleep apnea is mild and only occurs on your back.This example shows a pillow that straps around the waist. It may be appropriate for those whose sleep study shows milder sleep apnea that occurs primarily when lying flat on one's back. Preliminary studies have shown benefit but effectiveness at home may need to be verified by a home sleep test. These devices are generally not covered by medical insurance.    Oral Appliance  What is oral appliance therapy?  An oral appliance device fits on your teeth at night like a retainer used after having braces. The device is made by a specialized dentist and requires several visits over 1-2 months before a manufactured device is made to fit your teeth and is adjusted to prevent your sleep apnea. Once an oral device is working properly, snoring should be improved. A home sleep test may be recommended at that time if to determine whether the sleep apnea is adequately treated.       Some things to remember:  -Oral devices are often, but not always, covered by your medical insurance. Be sure to check with your insurance provider.   -If you are referred for oral therapy, you will be given a list of specialized dentists to consider or you may choose to visit the Web site of the American Academy of Dental Sleep Medicine  -Oral devices are less likely to work if you have severe sleep apnea or are extremely overweight.     More detailed information  An oral appliance is a small acrylic device that fits over the upper and lower teeth  (similar to a retainer or a mouth guard). This device slightly moves jaw forward, which moves the base of the tongue forward, opens the airway,  improves breathing for effective treat snoring and obstructive sleep apnea in perhaps 7 out of 10 people .  The best working devices are custom-made by a dental device  after a mold is made of the teeth 1, 2, 3.  When is an oral appliance indicated?  Oral appliance therapy is recommended as a first-line treatment for patients with primary snoring, mild sleep apnea, and for patients with moderate sleep apnea who prefer appliance therapy to use of CPAP4, 5. Severity of sleep apnea is determined by sleep testing and is based on the number of respiratory events per hour of sleep.   How successful is oral appliance therapy?  The success rate of oral appliance therapy in patients with mild sleep apnea is 75-80% while in patients with moderate sleep apnea it is 50-70%. The chance of success in patients with severe sleep apnea is 40-50%. The research also shows that oral appliances have a beneficial effect on the cardiovascular health of JYOTHI patients at the same magnitude as CPAP therapy7.  Oral appliances should be a second-line treatment in cases of severe sleep apnea, but if not completely successful then a combination therapy utilizing CPAP plus oral appliance therapy may be effective. Oral appliances tend to be effective in a broad range of patients although studies show that the patients who have the highest success are females, younger patients, those with milder disease, and less severe obesity. 3, 6.   Finding a dentist that practices dental sleep medicine  Specific training is available through the American Academy of Dental Sleep Medicine for dentists interested in working in the field of sleep. To find a dentist who is educated in the field of sleep and the use of oral appliances, near you, visit the Web site of the American Academy of Dental Sleep Medicine.    References  1. christina Blevins al. Objectively measured vs self-reported compliance during oral appliance therapy for sleep-disordered  breathing. Chest 2013; 144(5): 9232-1583.  2. Donna, et al. Objective measurement of compliance during oral appliance therapy for sleep-disordered breathing. Thorax 2013; 68(1): 91-96.  3. Dolly et al. Mandibular advancement devices in 620 men and women with JYOTHI and snoring: tolerability and predictors of treatment success. Chest 2004; 125: 4094-8918.  4. Jesus et al. Oral appliances for snoring and JYOTHI: a review. Sleep 2006; 29: 244-262.  5. Jessy et al. Oral appliance treatment for JYOTHI: an update. J Clin Sleep Med 2014; 10(2): 215-227.  6. Radha et al. Predictors of OSAH treatment outcome. J Dent Res 2007; 86: 4090-8459.      Weight Loss:    Weight loss is a long-term strategy that may improve sleep apnea in some patients.    Weight management is a personal decision.  If you are interested in exploring weight loss strategies, the following discussion covers the impact on weight loss on sleep apnea and the approaches that may be successful.    Weight loss decreases severity of sleep apnea in most people with obesity. For those with mild obesity who have developed snoring with weight gain, even 15-30 pound weight loss can improve and occasionally eliminate sleep apnea.  Structured and life-long dietary and health habits are necessary to lose weight and keep healthier weight levels.     Though there may be significant health benefits from weight loss, long-term weight loss is very difficult to achieve- studies show success with dietary management in less than 10% of people. In addition, substantial weight loss may require years of dietary control and may be difficult if patients have severe obesity. In these cases, surgical management may be considered.  Finally, older individuals who have tolerated obesity without health complications may be less likely to benefit from weight loss strategies.    Your BMI is Body mass index is 29.72 kg/m .    Weight management plan: Discussed healthy diet and  exercise guidelines    Surgery:    Surgery for obstructive sleep apnea is considered generally only when other therapies fail to work. Surgery may be discussed with you if you are having a difficult time tolerating CPAP and or when there is an abnormal structure that requires surgical correction.  Nose and throat surgeries often enlarge the airway to prevent collapse.  Most of these surgeries create pain for 1-2 weeks and up to half of the most common surgeries are not effective throughout life.  You should carefully discuss the benefits and drawbacks to surgery with your sleep provider and surgeon to determine if it is the best solution for you.   More information  Surgery for JYOTHI is directed at areas that are responsible for narrowing or complete obstruction of the airway during sleep.  There are a wide range of procedures available to enlarge and/or stabilize the airway to prevent blockage of breathing in the three major areas where it can occur: the palate, tongue, and nasal regions.  Successful surgical treatment depends on the accurate identification of the factors responsible for obstructive sleep apnea in each person.  A personalized approach is required because there is no single treatment that works well for everyone.  Because of anatomic variation, consultation with an examination by a sleep surgeon is a critical first step in determining what surgical options are best for each patient.  In some cases, examination during sedation may be recommended in order to guide the selection of procedures.  Patients will be counseled about risks and benefits as well as the typical recovery course after surgery. Surgery is typically not a cure for a person s JYOTHI.  However, surgery will often significantly improve one s JYOTHI severity (termed  success rate ).  Even in the absence of a cure, surgery will decrease the cardiovascular risk associated with OSA7; improve overall quality of life8 (sleepiness, functionality,  sleep quality, etc).      Palate Procedures:  Patients with JYOTHI often have narrowing of their airway in the region of their tonsils and uvula.  The goals of palate procedures are to widen the airway in this region as well as to help the tissues resist collapse.  Modern palate procedure techniques focus on tissue conservation and soft tissue rearrangement, rather than tissue removal.  Often the uvula is preserved in this procedure. Residual sleep apnea is common in patient after pharyngoplasty with an average reduction in sleep apnea events of 33%2.      Tongue Procedures:  ExamWhile patients are awake, the muscles that surround the throat are active and keep this region open for breathing. These muscles relax during sleep, allowing the tongue and other structures to collapse and block breathing.  There are several different tongue procedures available.  Selection of a tongue base procedure depends on characteristics seen on physical exam.  Generally, procedures are aimed at removing bulky tissues in this area or preventing the back of the tongue from falling back during sleep.  Success rates for tongue surgery range from 50-62%3.    Hypoglossal Nerve Stimulation:  Hypoglossal nerve stimulation has recently received approval from the [place] Food and Drug Administration for the treatment of obstructive sleep apnea.  This is based on research showing that the system was safe and effective in treating sleep apnea6.  Results showed that the median AHI score decreased 68%, from 29.3 to 9.0. This therapy uses an implant system that senses breathing patterns and delivers mild stimulation to airway muscles, which keeps the airway open during sleep.  The system consists of three fully implanted components: a small generator (similar in size to a pacemaker), a breathing sensor, and a stimulation lead.  Using a small handheld remote, a patient turns the therapy on before bed and off upon awakening.    Candidates for this  device must be greater than 22 years of age, have moderate to severe JYOTHI (AHI between 20-65), BMI less than 32, have tried CPAP/oral appliance without success, and have appropriate upper airway anatomy (determined by a sleep endoscopy performed by Dr. Lucero).    Hypoglossal Nerve Stimulation Pathway:    The sleep surgeon s office will work with the patient through the insurance prior-authorization process (including communications and appeals).    Nasal Procedures:  Nasal obstruction can interfere with nasal breathing during the day and night.  Studies have shown that relief of nasal obstruction can improve the ability of some patients to tolerate positive airway pressure therapy for obstructive sleep apnea1.  Treatment options include medications such as nasal saline, topical corticosteroid and antihistamine sprays, and oral medications such as antihistamines or decongestants. Non-surgical treatments can include external nasal dilators for selected patients. If these are not successful by themselves, surgery can improve the nasal airway either alone or in combination with these other options.      Combination Procedures:  Combination of surgical procedures and other treatments may be recommended, particularly if patients have more than one area of narrowing or persistent positional disease.  The success rate of combination surgery ranges from 66-80%2,3.    References  Trevon VAUGHAN. The Role of the Nose in Snoring and Obstructive Sleep Apnoea: An Update.  Eur Arch Otorhinolaryngol. 2011; 268: 1365-73.   Lucia SM; Shawna JA; Frances JR; Pallanch JF; Emelyn MB; Nani SG; Soniya MATHIS. Surgical modifications of the upper airway for obstructive sleep apnea in adults: a systematic review and meta-analysis. SLEEP 2010;33(10):2891-4241. Vicky CHRISTIANSON. Hypopharyngeal surgery in obstructive sleep apnea: an evidence-based medicine review.  Arch Otolaryngol Head Neck Surg. 2006 Feb;132(2):206-13.  Baldev YH1, Jojo Y, Vimal CHRIS. The  efficacy of anatomically based multilevel surgery for obstructive sleep apnea. Otolaryngol Head Neck Surg. 2003 Oct;129(4):327-35.  Vicky CHRISTIANSON, Goldberg A. Hypopharyngeal Surgery in Obstructive Sleep Apnea: An Evidence-Based Medicine Review. Arch Otolaryngol Head Neck Surg. 2006 Feb;132(2):206-13.  Xavier PJ et al. Upper-Airway Stimulation for Obstructive Sleep Apnea.  N Engl J Med. 2014 Jan 9;370(2):139-49.  Leslye Y et al. Increased Incidence of Cardiovascular Disease in Middle-aged Men with Obstructive Sleep Apnea. Am J Respir Crit Care Med; 2002 166: 159-165  Schumacher EM et al. Studying Life Effects and Effectiveness of Palatopharyngoplasty (SLEEP) study: Subjective Outcomes of Isolated Uvulopalatopharyngoplasty. Otolaryngol Head Neck Surg. 2011; 144: 623-631.

## 2024-03-27 NOTE — PROGRESS NOTES
[facility]  Consultation Note    Name: Manan Bryant MRN#: [id number]   Age: 68 year old YOB: 1955     Date of Consultation: 03/27/24  Consultation is requested by: Ric Villela  Primary care provider: Ric Villela MD, MD    History of Present Illness:   Manan Bryant is a 68 year old male patient with HTN, HLD, CAD w/ Hx of MI s/p PCI, paroxysmal A-fib, Hx of NSVT, and GERD   He is sent by Ric Villela for a sleep consultation regarding Sleep Apnea  .    Manan Bryant main reason for visit: Many O2 drops - 30+ per hour  Patient states problem(s) started: No idea  Manan Bryant's goals for this visit: Assessment of O2 drops    He has not had a previous sleep study.    CPAP: No    Assessment and Plan:     Problem List Items Addressed This Visit          Respiratory    Obstructive sleep apnea - Primary     STOP BANG score is 5/8. Patient likely has sleep apnea based on clinical history of witnessed, HTN, Age, Neck Circumference, Gender, Insomnia(JUAN 13/28), Nocturia, and morning dry mouth.   Obstructive sleep apnea reviewed. Complications of Untreated Sleep Apnea Reviewed.  Additionally patient has detected nocturnal hypoxemia with home monitoring.   HST/ Polysomnography reviewed. Information provided regarding treatment options for JYOTHI.  Recommend in-lab sleep study to assess for JYOTHI due high pretest probability for sleep apnea.            Relevant Orders    Comprehensive Sleep Study     Other Visit Diagnoses       Sleep apnea, unspecified type        Nocturnal hypoxia                SLEEP-WAKE SCHEDULE:   Work/School Days: Patient goes to school/work: No   Usually gets into bed at 10  Takes patient about An hour to fall asleep  Has trouble falling asleep 2 or 3 nights per week  Wakes up in the middle of the night 2 to 4 times.  Wakes up due to Use the bathroom;Uncertain  He has trouble falling back asleep 2 or 3 times a week.   It usually takes 30 to  90 minutes to get back to sleep  Patient is usually up at About 8  Uses alarm: No  Sleep Inertia: No    Weekends/Non-work Days/All Other Days:  Usually gets into bed at 10   Takes patient about 30 to 60 minutes to fall asleep  Patient is usually up at 8  Uses alarm: No    Sleep Need  Patient gets  7 sleep on average   Patient thinks He needs about 8 sleep    Manan Bryant prefers to sleep in this position(s): Back;Side   Patient states they do the following activities in bed: Use phone, computer, or tablet    Naps  Patient takes a purposeful nap 0 times a week and naps are usually   in duration  He feels better after a nap:    He dozes off unintentionally 0 days per week  Patient has had a driving accident or near-miss due to sleepiness/drowsiness: No      SLEEP DISRUPTIONS:  Breathing/Snoring  Patient snores:No  Other people complain about His snoring: No  Patient has been told He stops breathing in His sleep:Yes  He has issues with the following: Morning mouth dryness    Movement:  Patient gets pain, discomfort, with an urge to move:  No  It happens when He is resting:     It happens more at night:     Patient has been told Manan Bryant kicks His legs at night:  No     Behaviors in Sleep:  Manan Bryant has experienced the following behaviors while sleeping:    He has experienced sudden muscle weakness during the day: No    Is there anything else you would like your sleep provider to know:        CAFFEINE AND OTHER SUBSTANCES:  Patient consumes caffeinated beverages per day:  2  Last caffeine use is usually: 10am  List of any prescribed or over the counter stimulants that patient takes:    List of any prescribed or over the counter sleep medication patient takes:    List of previous sleep medications that patient has tried:    Patient drinks alcohol to help them sleep: No  Patient drinks alcohol near bedtime: No    Family History:  Patient has a family member been diagnosed with a sleep disorder: No             SCALES:  EPWORTH SLEEPINESS SCALE       3/27/2024     8:56 AM    Irene Sleepiness Scale ( CYN Pepe  [id number]<br>ESS - [place]/English - Final version - 21 Nov 07 - [facility] [employer] Research [place].)   Sitting and reading Would never doze   Watching TV Would never doze   Sitting, inactive in a public place (e.g. a theatre or a meeting) Would never doze   As a passenger in a car for an hour without a break Would never doze   Lying down to rest in the afternoon when circumstances permit Slight chance of dozing   Sitting and talking to someone Would never doze   Sitting quietly after a lunch without alcohol Would never doze   In a car, while stopped for a few minutes in traffic Would never doze   Irene Score (MC) 1   Irene Score (Sleep) 1       INSOMNIA SEVERITY INDEX (JUAN)        3/27/2024     8:41 AM   Insomnia Severity Index (JUAN)   Difficulty falling asleep 2   Difficulty staying asleep 2   Problems waking up too early 3   How SATISFIED/DISSATISFIED are you with your CURRENT sleep pattern? 3   How NOTICEABLE to others do you think your sleep problem is in terms of impairing the quality of your life? 0   How WORRIED/DISTRESSED are you about your current sleep problem? 2   To what extent do you consider your sleep problem to INTERFERE with your daily functioning (e.g. daytime fatigue, mood, ability to function at work/daily chores, concentration, memory, mood, etc.) CURRENTLY? 1   JUAN Total Score 13    Guidelines for Scoring/Interpretation:  Total score categories:  0-7 = No clinically significant insomnia   8-14 = Subthreshold insomnia   15-21 = Clinical insomnia (moderate severity)  22-28 = Clinical insomnia (severe)  Used via courtesy of www.Kenze[place]alth.va.gov with permission from Marcus Delgado PhD., [facility]      STOP BANG   JYOTHI High Risk            Total Score: 5    JYOTHI: Observed stopped breathing    JYOTHI: Hypertension    JYOTHI: Over 50 ys old    JYOTHI: Neck Circum >16 in    JYOTIH: Male          GAD7        "No data to display                  CAGE-AID       No data to display              CAGE-AID reprinted with permission from the [place] Medical Journal, IQRA Singh. and SEKOU Jin, \"Conjoint screening questionnaires for alcohol and drug abuse\" [place] Medical Journal 94: 135-140, 1995.      PATIENT HEALTH QUESTIONNAIRE-9 (PHQ - 9)       No data to display              Developed by Noe Lazo, Iqra Barraza, Mayur Boyle and colleagues, with an educational harlan from [employer] No permission required to reproduce, translate, display or distribute.      Allergies:    No Known Allergies    Medications:    Current Outpatient Medications   Medication Sig Dispense Refill    Cholecalciferol (VITAMIN D PO) Take 1,000 mg by mouth daily       clopidogrel (PLAVIX) 75 MG tablet Take 1 tablet (75 mg) by mouth daily 90 tablet 3    dapagliflozin (FARXIGA) 10 MG TABS tablet Take 10 mg by mouth daily      losartan (COZAAR) 100 MG tablet Take 1 tablet (100 mg) by mouth daily 90 tablet 3    metoprolol succinate ER (TOPROL XL) 50 MG 24 hr tablet Take 1 tablet (50 mg) by mouth daily 90 tablet 3    metoprolol tartrate (LOPRESSOR) 25 MG tablet Take 1 tablet (25 mg) by mouth 2 times daily as needed (a fib with tachycardia (rates >110 at rest)) 180 tablet 3    metroNIDAZOLE (METROGEL) 0.75 % external gel Apply topically 2 times daily (Patient taking differently: Apply topically 2 times daily as needed) 45 g 3    Multiple Vitamin (MULTI-VITAMIN PO) Take by mouth daily       nitroGLYcerin (NITROSTAT) 0.4 MG sublingual tablet Place 1 tablet (0.4 mg) under the tongue every 5 minutes as needed for chest pain 25 tablet 3    omeprazole (PRILOSEC) 20 MG DR capsule Take 1 capsule (20 mg) by mouth daily 90 capsule 0    rivaroxaban ANTICOAGULANT (XARELTO) 20 MG TABS tablet Take 1 tablet (20 mg) by mouth daily (with dinner) 90 tablet 3    rosuvastatin (CRESTOR) 40 MG tablet Take 1 tablet (40 mg) by mouth daily 90 tablet 3    " triamterene-HCTZ (DYAZIDE) 37.5-25 MG capsule Take 1 capsule by mouth every morning 90 capsule 1       Problem List:  Patient Active Problem List    Diagnosis Date Noted    Obstructive sleep apnea 03/27/2024     Priority: Medium    Hypokalemia 12/28/2023     Priority: Medium    Atrial fibrillation with rapid ventricular response (H) 12/28/2023     Priority: Medium    Acute pancreatitis, unspecified complication status, unspecified pancreatitis type 12/28/2023     Priority: Medium    Glucose intolerance (impaired glucose tolerance) 01/03/2023     Priority: Medium    Class 1 obesity due to excess calories without serious comorbidity with body mass index (BMI) of 30.0 to 30.9 in adult 01/03/2023     Priority: Medium    Benign essential hypertension      Priority: Medium    NAFLD (nonalcoholic fatty liver disease) 07/12/2019     Priority: Medium    Esophageal stricture 07/12/2019     Priority: Medium    HDL deficiency 08/20/2018     Priority: Medium    Paroxysmal supraventricular tachycardia      Priority: Medium    NSVT (nonsustained ventricular tachycardia) 07/07/2016     Priority: Medium    SVT (supraventricular tachycardia)      Priority: Medium    History of acute anterior wall MI      Priority: Medium     2010      Coronary artery disease involving native coronary artery of native heart without angina pectoris      Priority: Medium     cardiac cath 8/5/2010: ELLIOTT to LAD      Hyperlipidemia LDL goal <70      Priority: Medium    PVC (premature ventricular contraction)      Priority: Medium     Hx ventricular tachycardia during cardiac rehab 2010      Family history of early CAD      Priority: Medium        Past Medical/Surgical History:  Past Medical History:   Diagnosis Date    CAD (coronary artery disease)     cardiac cath 8/5/2010: ELLIOTT to LAD    Essential hypertension, benign     Family history of early CAD     History of acute anterior wall MI 2010 2010    History of colonic polyps     Mixed hyperlipidemia      Myocardial infarction (H)     Paroxysmal supraventricular tachycardia     PVC (premature ventricular contraction)     Hx ventricular tachycardia during cardiac rehab 2010    SVT (supraventricular tachycardia)     Syncope     episode 2016 - EP study - loop recorder implanted 2016 - non-functional after 3 years     Past Surgical History:   Procedure Laterality Date    COLONOSCOPY      CV CORONARY ANGIOGRAM N/A 12/29/2023    Procedure: Coronary Angiogram;  Surgeon: Edson Benson MD;  Location:  [facility]    CV PCI N/A 12/29/2023    Procedure: Percutaneous Coronary Intervention;  Surgeon: Edson Benson MD;  [facility]: [facility] CARDIAC [facility]    STENT, CORONARY, EMEKA  8/2010    LAD       Social History:  Social History     Socioeconomic History    Marital status:      Spouse name: Not on file    Number of children: Not on file    Years of education: Not on file    Highest education level: Not on file   Occupational History    Not on file   Tobacco Use    Smoking status: Never    Smokeless tobacco: Never   Substance and Sexual Activity    Alcohol use: No     Alcohol/week: 0.0 standard drinks of alcohol    Drug use: No    Sexual activity: Not Currently     Partners: Female     Birth control/protection: Male Surgical   Other Topics Concern    Parent/sibling w/ CABG, MI or angioplasty before 65F 55M? Yes     Comment: Both brothers - one in his early 40's.  Both have repeated.     Service Not Asked    Blood Transfusions Not Asked    Caffeine Concern No     Comment: decaf: 1-2 cups a day. Diek coke: 4-5 cans a day    Occupational Exposure Not Asked    Hobby Hazards Not Asked    Sleep Concern No    Stress Concern No    Weight Concern No    Special Diet Yes     Comment: limited, heart healthy    Back Care Not Asked    Exercise Yes     Comment: bike outside, 1-3x a week    Bike Helmet Yes    Seat Belt Yes    Self-Exams Not Asked   Social History Narrative    ,    Retired -  continues      Social Determinants of Health     Financial Resource Strain: Low Risk  (1/30/2024)    Financial Resource Strain     Within the past 12 months, have you or your family members you live with been unable to get utilities (heat, electricity) when it was really needed?: No   Food Insecurity: Low Risk  (1/30/2024)    Food Insecurity     Within the past 12 months, did you worry that your food would run out before you got money to buy more?: No     Within the past 12 months, did the food you bought just not last and you didn t have money to get more?: No   Transportation Needs: Low Risk  (1/30/2024)    Transportation Needs     Within the past 12 months, has lack of transportation kept you from medical appointments, getting your medicines, non-medical meetings or appointments, work, or from getting things that you need?: No   Physical Activity: Not on file   Stress: Not on file   Social Connections: Not on file   Interpersonal Safety: Low Risk  (1/30/2024)    Interpersonal Safety     Do you feel physically and emotionally safe where you currently live?: Yes     Within the past 12 months, have you been hit, slapped, kicked or otherwise physically hurt by someone?: No     Within the past 12 months, have you been humiliated or emotionally abused in other ways by your partner or ex-partner?: No   Housing Stability: Low Risk  (1/30/2024)    Housing Stability     Do you have housing? : Yes     Are you worried about losing your housing?: No       Family History:  Family History   Problem Relation Age of Onset    Coronary Artery Disease Father     Myocardial Infarction Father     Coronary Artery Disease Brother     Myocardial Infarction Brother     Heart Surgery Brother         bypass    Coronary Artery Disease Paternal Grandfather     Myocardial Infarction Paternal Grandfather     Sleep Apnea Sister     Family History Negative Mother     Family History Negative Maternal Grandmother     Heart  "Failure Maternal Grandfather     Family History Negative Paternal Grandmother     Myocardial Infarction Brother     Coronary Artery Disease Brother     Heart Surgery Brother         bypass    Coronary Artery Disease Brother     Obesity Sister     Obesity Brother     Obesity Brother        Review of Systems:   A complete review of systems reviewed by me is negative with the exeption of what has been mentioned in the history of present illness.  In the last TWO WEEKS have you experienced any of the following symptoms?  Fevers: No  Night Sweats: No  Weight Gain: No  Pain at Night: No  Double Vision: No  Changes in Vision: No  Difficulty Breathing through Nose: No  Sore Throat in Morning: No  Dry Mouth in the Morning: No  Shortness of Breath Lying Flat: No  Shortness of Breath With Activity: No  Awakening with Shortness of Breath: No  Increased Cough: No  Heart Racing at Night: No  Swelling in Feet or Legs: No  Diarrhea at Night: No  Heartburn at Night: No  Urinating More than Once at Night: Yes  Losing Control of Urine at Night: No  Joint Pains at Night: No  Headaches in Morning: No  Weakness in Arms or Legs: No  Depressed Mood: No  Anxiety: No    Physical Exam:   Vitals: /86   Pulse 54   Ht 1.803 m (5' 10.98\")   Wt 96.6 kg (213 lb)   SpO2 98%   BMI 29.72 kg/m    BMI= Body mass index is 29.72 kg/m .  Neck Cir (cm): 46 cm  GENERAL: alert and no distress  EYES: Eyes grossly normal to inspection.  No discharge or erythema, or obvious scleral/conjunctival abnormalities.  RESP: No audible wheeze, cough, or visible cyanosis.    SKIN: Visible skin clear. No significant rash, abnormal pigmentation or lesions.  NEURO: Cranial nerves grossly intact.  Mentation and speech appropriate for age.  PSYCH: Appropriate affect, tone, and pace of words         Data: All pertinent previous laboratory data reviewed     Recent Labs   Lab Test 03/08/24  0824 01/12/24  1059    141   POTASSIUM 3.9 4.7   CHLORIDE 100 105   CO2 " "27 25   ANIONGAP 11 11   * 100*   BUN 25.7* 23.0   CR 1.48* 1.26*   LES 9.4 9.9       Recent Labs   Lab Test 01/12/24  1059   WBC 6.4   RBC 5.54   HGB 16.8   HCT 50.1   MCV 90   MCH 30.3   MCHC 33.5   RDW 12.6          Recent Labs   Lab Test 01/12/24  1059   PROTTOTAL 7.3   ALBUMIN 4.5   BILITOTAL 0.7   ALKPHOS 112   AST 29   ALT 30       TSH   Date Value   01/12/2024 4.13 uIU/mL   12/28/2023 7.50 uIU/mL (H)   09/14/2021 2.74 mU/L   03/03/2021 5.18 mU/L (H)   06/26/2015 2.42 mU/L       No results found for: \"UAMP\", \"UBARB\", \"BENZODIAZEUR\", \"UCANN\", \"UCOC\", \"OPIT\", \"UPCP\"    No results found for: \"IRONSAT\", \"LC56046\", \"SOREN\"    No results found for: \"PH\", \"PHARTERIAL\", \"PO2\", \"EA9NMWTCTNO\", \"SAT\", \"PCO2\", \"HCO3\", \"BASEEXCESS\", \"DENA\", \"BEB\"    @LABRCNTIPR(phv:4,pco2v:4,po2v:4,hco3v:4,negar:4,o2per:4)@    Echocardiology: No results found for this or any previous visit (from the past 4320 hour(s)).    Chest x-ray:   Chest XR,  PA & LAT 12/25/2023    Narrative  EXAM: XR CHEST 2 VIEWS  LOCATION: [facility]  DATE: 12/25/2023    INDICATION: fever, chest pain  COMPARISON: 01/11/2016    Impression  IMPRESSION: Heart is normal in size. Cardiac recording device. Lungs are clear.      Chest CT:   CT Chest (PE) Abdomen Pelvis w Contrast 12/28/2023    Narrative  EXAM: CT CHEST PE ABDOMEN PELVIS W CONTRAST  LOCATION: [facility]  DATE: 12/28/2023    INDICATION: sob, tachycardia, pancreatitis  COMPARISON: CT chest 12/25/2023  TECHNIQUE: CT chest pulmonary angiogram and routine CT abdomen pelvis with IV contrast. Arterial phase through the chest and venous phase through the abdomen and pelvis. Multiplanar reformats and MIP reconstructions were performed. Dose reduction  techniques were used.  CONTRAST: 112 mL Isovue   370    FINDINGS:  ANGIOGRAM CHEST: Pulmonary arteries are normal caliber and negative for pulmonary emboli. Thoracic aorta is negative for dissection. No CT " evidence of right heart strain.    LUNGS AND PLEURA: Mild left basilar atelectasis. Lungs otherwise clear. No pleural effusions. No pneumothorax.    MEDIASTINUM/AXILLAE: No lymphadenopathy. No thoracic aortic aneurysms. No pericardial effusion. Left chest wall implanted cardiac device. Small hiatal hernia.    CORONARY ARTERY CALCIFICATION: Mild.    HEPATOBILIARY: No significant mass or bile duct dilatation. No calcified gallstones.    PANCREAS: Fat stranding about the pancreatic tail. No organized fluid collection. No pancreatic duct dilation.    SPLEEN: Normal.    ADRENAL GLANDS: Normal.    KIDNEYS/BLADDER: Normal.    BOWEL: No obstruction or inflammatory change. Normal appendix.    LYMPH NODES: Normal.    VASCULATURE: Unremarkable.    PELVIC ORGANS: Normal.    MUSCULOSKELETAL: Small periumbilical fat-containing hernia. Mild degenerative changes of the spine.    Impression  IMPRESSION:  1.  No pulmonary embolism. No acute process in the chest.  2.  Acute pancreatitis.      PFT: Most Recent [place] Pulmonary Function Testing  No results found     Patient was strongly advised to avoid driving, operating any heavy machinery or other hazardous situations while drowsy or sleepy.  Patient was counseled on the importance of driving while alert, to pull over if drowsy, or nap before getting into the vehicle if sleepy.      Plan is for Manan Bryant to follow up following PSG.     The above note was dictated using voice recognition software. Although reviewed after completion, some word and grammatical error may remain . Please contact the author for any clarifications.    Total time spent reviewing medical records, history and physical examination, review of previous testing and interpretation as well as documentation on this date, 03/27/24: 40 minutes    Shalonda Shipley MD on 10/20/2022   [facility]   Floor 1, [address]   [address] [place] [address]   Appointments: [phone] [facility]   Floor 1, [address]   [address] [place] [address]   Appointments: [phone]     CC: Ric Villela

## 2024-03-27 NOTE — ASSESSMENT & PLAN NOTE
STOP BANG score is 5/8. Patient likely has sleep apnea based on clinical history of witnessed, HTN, Age, Neck Circumference, Gender, Insomnia(JUAN 13/28), Nocturia, and morning dry mouth.   Obstructive sleep apnea reviewed. Complications of Untreated Sleep Apnea Reviewed.  Additionally patient has detected nocturnal hypoxemia with home monitoring.   HST/ Polysomnography reviewed. Information provided regarding treatment options for JYOTHI.  Recommend in-lab sleep study to assess for JYOTHI due high pretest probability for sleep apnea.

## 2024-03-29 ENCOUNTER — HOSPITAL ENCOUNTER (OUTPATIENT)
Dept: CARDIAC REHAB | Facility: CLINIC | Age: 69
Discharge: HOME | End: 2024-03-29
Attending: INTERNAL MEDICINE
Payer: MEDICARE

## 2024-03-29 PROCEDURE — 93798 PHYS/QHP OP CAR RHAB W/ECG: CPT

## 2024-04-02 ENCOUNTER — HOSPITAL ENCOUNTER (OUTPATIENT)
Dept: CARDIAC REHAB | Facility: CLINIC | Age: 69
Discharge: HOME | End: 2024-04-02
Attending: INTERNAL MEDICINE
Payer: MEDICARE

## 2024-04-02 PROCEDURE — 93798 PHYS/QHP OP CAR RHAB W/ECG: CPT

## 2024-04-05 ENCOUNTER — HOSPITAL ENCOUNTER (OUTPATIENT)
Dept: CARDIAC REHAB | Facility: CLINIC | Age: 69
Discharge: HOME | End: 2024-04-05
Attending: INTERNAL MEDICINE
Payer: MEDICARE

## 2024-04-05 PROCEDURE — 93798 PHYS/QHP OP CAR RHAB W/ECG: CPT

## 2024-04-09 ENCOUNTER — HOSPITAL ENCOUNTER (OUTPATIENT)
Dept: CARDIAC REHAB | Facility: CLINIC | Age: 69
Discharge: HOME | End: 2024-04-09
Attending: INTERNAL MEDICINE
Payer: MEDICARE

## 2024-04-09 PROCEDURE — 93798 PHYS/QHP OP CAR RHAB W/ECG: CPT

## 2024-04-12 ENCOUNTER — HOSPITAL ENCOUNTER (OUTPATIENT)
Dept: CARDIAC REHAB | Facility: CLINIC | Age: 69
Discharge: HOME | End: 2024-04-12
Attending: INTERNAL MEDICINE
Payer: MEDICARE

## 2024-04-12 PROCEDURE — 93798 PHYS/QHP OP CAR RHAB W/ECG: CPT

## 2024-04-22 ENCOUNTER — THERAPY VISIT (OUTPATIENT)
Dept: SLEEP MEDICINE | Facility: CLINIC | Age: 69
End: 2024-04-22
Payer: MEDICARE

## 2024-04-22 DIAGNOSIS — G47.33 OBSTRUCTIVE SLEEP APNEA: ICD-10-CM

## 2024-04-22 PROCEDURE — 95810 POLYSOM 6/> YRS 4/> PARAM: CPT | Performed by: STUDENT IN AN ORGANIZED HEALTH CARE EDUCATION/TRAINING PROGRAM

## 2024-04-23 NOTE — PATIENT INSTRUCTIONS
[facility]    1. Your sleep study will be reviewed by a sleep physician within the next few days.     2. Please follow up in the sleep clinic as scheduled, or, make an appointment with your sleep provider to be seen within two weeks to discuss the results of the sleep study.    3. If you have any questions or problems with your treatment plan, please contact your sleep clinic provider at [phone] to further manage your condition.    4. Please review your attached medication list, and, at your follow-up appointment advise your sleep clinic provider about any changes.    5. Go to [email or web address] for more information about your sleep problems.    Home GUTIERREZ RRT, RPSGT  April 23, 2024

## 2024-04-30 ENCOUNTER — HOSPITAL ENCOUNTER (OUTPATIENT)
Dept: CARDIAC REHAB | Facility: CLINIC | Age: 69
Discharge: HOME | End: 2024-04-30
Attending: INTERNAL MEDICINE
Payer: MEDICARE

## 2024-04-30 PROCEDURE — 93798 PHYS/QHP OP CAR RHAB W/ECG: CPT

## 2024-05-05 DIAGNOSIS — K21.9 GASTROESOPHAGEAL REFLUX DISEASE WITHOUT ESOPHAGITIS: ICD-10-CM

## 2024-05-14 LAB — SLPCOMP: NORMAL

## 2024-05-14 NOTE — PROCEDURES
" SLEEP STUDY INTERPRETATION  DIAGNOSTIC POLYSOMNOGRAPHY REPORT      Patient: HENRY MEJIA  YOB: 1955  Study Date: 4/22/2024  MRN: [id number]  Referring Provider: Ric Villela MD  Ordering Provider: Shalonda Shipley MD    Indications for Polysomnography: The patient is a 68 year old Male who is 5' 11\" and weighs 213.0 lbs. His BMI is 29.8, [place] sleepiness scale 1/24 and neck circumference is 46 cm. Relevant medical history includes HTN, HLD, CAD w/ Hx of MI s/p PCI, paroxysmal A-fib, Hx of NSVT, and GERD. A diagnostic polysomnogram was performed to evaluate for sleep apnea/PLMS/RLS/RBD/S-S/CSA/hypoventilation/hypoxemia.    Polysomnogram Data: A full night polysomnogram recorded the standard physiologic parameters including EEG, EOG, EMG, ECG, nasal and oral airflow. Respiratory parameters of chest and abdominal movements were recorded with respiratory inductance plethysmography. Oxygen saturation was recorded by pulse oximetry. Hypopnea scoring rule used: 1B 4%.    Sleep Architecture: Sleep was fragmented with an increased arousal index. Sleep efficiency was decreased with a normal sleep latency and a prolonged wake after sleep onset. All stages of sleep were observed.   The total recording time of the polysomnogram was 419.6 minutes. The total sleep time was 303.0 minutes. Sleep latency was normal at 7.8 minutes without the use of a sleep aid. REM latency was 68.5 minutes. Arousal index was increased at 48.1 arousals per hour. Sleep efficiency was decreased at 72.2%. Wake after sleep onset was 108.5 minutes. The patient spent 22.6% of total sleep time in Stage N1, 53.6% in Stage N2, 13.5% in Stage N3, and 10.2% in REM. Time in REM supine was 22.0 minutes.    Respiration: Central Sleep Apnea with Cheyne-Ecsalante breathing and Sleep Associated Hypoxemia was observed.  Events ? The polysomnogram revealed a presence of 4 obstructive, 138 central, and 13 mixed apneas resulting in an apnea " index of 30.7 events per hour. There were 27 obstructive hypopneas and 72 central hypopneas resulting in an obstructive hypopnea index of 5.3 and central hypopnea index of 14.3 events per hour. The combined apnea/hypopnea index was 50.3 events per hour (central apnea/hypopnea index was 41.6 events per hour). The REM AHI was 42.6 events per hour. The supine AHI was 48.6 events per hour. The RERA index was 2.6 events per hour.  The RDI was 52.9 events per hour.  Snoring - was reported as absent.  Respiratory rate and pattern - was notable for Cheyne-Escalante respiratory rate and pattern.  Sustained Sleep Associated Hypoventilation - Transcutaneous carbon dioxide monitoring was not used.  Sleep Associated Hypoxemia - (Greater than 5 minutes O2 sat at or below 88%) was present. Baseline oxygen saturation was 91.3%. Lowest oxygen saturation was 80.0%. Time spent less than or equal to 88% was 58.1 minutes. Time spent less than or equal to 89% was 79.8 minutes.    Movement Activity: No abnormal movements or behaviors were observed.   Periodic Limb Activity - There were 4 PLMs during the entire study. The PLM index was 0.8 movements per hour. The PLM Arousal Index was 0.2 per hour.  REM EMG Activity - Excessive transient/sustained muscle activity was not present.  Nocturnal Behavior - Abnormal sleep related behaviors were not noted during/arising out of NREM / REM sleep.  Bruxism - None apparent.    Cardiac Summary: Occasional PVCs/PACs were observed. However, there were no sustained arrythmias observed.   The average pulse rate was 46.3 bpm. The minimum pulse rate was 40.0 bpm while the maximum pulse rate was 74.0 bpm.  Arrhythmias were noted.      Assessment:   Sleep was fragmented with an increased arousal index. Sleep efficiency was decreased with a normal sleep latency and a prolonged wake after sleep onset. All stages of sleep were observed.   Central Sleep Apnea with Cheyne-Escalante breathing and Sleep Associated  Hypoxemia was observed.  No abnormal movements or behaviors were observed.   Occasional PVCs/PACs were observed. However, there were no sustained arrythmias observed.     Recommendations:  Recommend repeat polysomnography with full night titration of positive airway pressure therapy for the control of sleep disordered breathing(Central Sleep Apnea).  Advice regarding the risks of drowsy driving.  Suggest optimizing sleep schedule and avoiding sleep deprivation.  Weight management (if BMI > 30).  Pharmacologic therapy should be used for management of restless legs syndrome only if present and clinically indicated and not based on the presence of periodic limb movements alone.    Diagnostic Codes:   Central Sleep Apnea G47.33  Sleep Hypoxemia G47.36       4/22/2024 [facility] Sleep Study (213.0 lbs) - AHI 50.3, RDI 52.9, Supine AHI 48.6, REM AHI 42.6, Low O2 80.0%, Time Spent ?88% 58.1 minutes / Time Spent ?89% 79.8 minutes.      _____________________________________   Electronically Signed By: Shalonda Shipley MD 05/14/2023

## 2024-08-08 DIAGNOSIS — K21.9 GASTROESOPHAGEAL REFLUX DISEASE WITHOUT ESOPHAGITIS: ICD-10-CM

## 2024-11-08 ENCOUNTER — MYC REFILL (OUTPATIENT)
Dept: FAMILY MEDICINE | Facility: CLINIC | Age: 69
End: 2024-11-08
Payer: MEDICARE

## 2024-11-08 DIAGNOSIS — K21.9 GASTROESOPHAGEAL REFLUX DISEASE WITHOUT ESOPHAGITIS: ICD-10-CM

## 2024-11-30 ENCOUNTER — APPOINTMENT (OUTPATIENT)
Dept: MRI IMAGING | Facility: CLINIC | Age: 69
End: 2024-11-30
Attending: EMERGENCY MEDICINE
Payer: MEDICARE

## 2024-11-30 ENCOUNTER — APPOINTMENT (OUTPATIENT)
Dept: CT IMAGING | Facility: CLINIC | Age: 69
End: 2024-11-30
Attending: EMERGENCY MEDICINE
Payer: MEDICARE

## 2024-11-30 ENCOUNTER — HOSPITAL ENCOUNTER (EMERGENCY)
Facility: CLINIC | Age: 69
Discharge: HOME | End: 2024-11-30
Attending: EMERGENCY MEDICINE | Admitting: EMERGENCY MEDICINE
Payer: MEDICARE

## 2024-11-30 VITALS
RESPIRATION RATE: 23 BRPM | HEART RATE: 49 BPM | OXYGEN SATURATION: 96 % | DIASTOLIC BLOOD PRESSURE: 65 MMHG | TEMPERATURE: 97.7 F | SYSTOLIC BLOOD PRESSURE: 131 MMHG

## 2024-11-30 DIAGNOSIS — F05 ACUTE CONFUSIONAL STATE: ICD-10-CM

## 2024-11-30 DIAGNOSIS — G45.9 TIA (TRANSIENT ISCHEMIC ATTACK): ICD-10-CM

## 2024-11-30 LAB
ALBUMIN SERPL BCG-MCNC: 4.4 G/DL (ref 3.5–5.2)
ALP SERPL-CCNC: 115 U/L (ref 40–150)
ALT SERPL W P-5'-P-CCNC: 37 U/L (ref 0–70)
ANION GAP SERPL CALCULATED.3IONS-SCNC: 14 MMOL/L (ref 7–15)
AST SERPL W P-5'-P-CCNC: 38 U/L (ref 0–45)
BASOPHILS # BLD AUTO: 0 10E3/UL (ref 0–0.2)
BASOPHILS NFR BLD AUTO: 0 %
BILIRUB SERPL-MCNC: 0.7 MG/DL
BUN SERPL-MCNC: 18.3 MG/DL (ref 8–23)
CALCIUM SERPL-MCNC: 9.2 MG/DL (ref 8.8–10.4)
CHLORIDE SERPL-SCNC: 104 MMOL/L (ref 98–107)
CREAT SERPL-MCNC: 1.27 MG/DL (ref 0.67–1.17)
EGFRCR SERPLBLD CKD-EPI 2021: 62 ML/MIN/1.73M2
EOSINOPHIL # BLD AUTO: 0.1 10E3/UL (ref 0–0.7)
EOSINOPHIL NFR BLD AUTO: 1 %
ERYTHROCYTE [DISTWIDTH] IN BLOOD BY AUTOMATED COUNT: 13.1 % (ref 10–15)
GLUCOSE SERPL-MCNC: 168 MG/DL (ref 70–99)
HCO3 SERPL-SCNC: 21 MMOL/L (ref 22–29)
HCT VFR BLD AUTO: 49.9 % (ref 40–53)
HGB BLD-MCNC: 17 G/DL (ref 13.3–17.7)
HOLD SPECIMEN: NORMAL
IMM GRANULOCYTES # BLD: 0 10E3/UL
IMM GRANULOCYTES NFR BLD: 0 %
LYMPHOCYTES # BLD AUTO: 1.6 10E3/UL (ref 0.8–5.3)
LYMPHOCYTES NFR BLD AUTO: 20 %
MCH RBC QN AUTO: 30.3 PG (ref 26.5–33)
MCHC RBC AUTO-ENTMCNC: 34.1 G/DL (ref 31.5–36.5)
MCV RBC AUTO: 89 FL (ref 78–100)
MONOCYTES # BLD AUTO: 0.7 10E3/UL (ref 0–1.3)
MONOCYTES NFR BLD AUTO: 8 %
NEUTROPHILS # BLD AUTO: 5.7 10E3/UL (ref 1.6–8.3)
NEUTROPHILS NFR BLD AUTO: 70 %
NRBC # BLD AUTO: 0 10E3/UL
NRBC BLD AUTO-RTO: 0 /100
PLATELET # BLD AUTO: 185 10E3/UL (ref 150–450)
POTASSIUM SERPL-SCNC: 4.1 MMOL/L (ref 3.4–5.3)
PROT SERPL-MCNC: 7.1 G/DL (ref 6.4–8.3)
RBC # BLD AUTO: 5.61 10E6/UL (ref 4.4–5.9)
SODIUM SERPL-SCNC: 139 MMOL/L (ref 135–145)
WBC # BLD AUTO: 8.2 10E3/UL (ref 4–11)

## 2024-11-30 PROCEDURE — 36415 COLL VENOUS BLD VENIPUNCTURE: CPT | Performed by: EMERGENCY MEDICINE

## 2024-11-30 PROCEDURE — 99285 EMERGENCY DEPT VISIT HI MDM: CPT | Mod: 25

## 2024-11-30 PROCEDURE — G1010 CDSM STANSON: HCPCS

## 2024-11-30 PROCEDURE — 80053 COMPREHEN METABOLIC PANEL: CPT | Performed by: EMERGENCY MEDICINE

## 2024-11-30 PROCEDURE — 85025 COMPLETE CBC W/AUTO DIFF WBC: CPT | Performed by: EMERGENCY MEDICINE

## 2024-11-30 PROCEDURE — 70450 CT HEAD/BRAIN W/O DYE: CPT | Mod: MG

## 2024-11-30 PROCEDURE — 70551 MRI BRAIN STEM W/O DYE: CPT | Mod: MG

## 2024-11-30 PROCEDURE — 70496 CT ANGIOGRAPHY HEAD: CPT | Mod: MG

## 2024-11-30 PROCEDURE — 82040 ASSAY OF SERUM ALBUMIN: CPT | Performed by: EMERGENCY MEDICINE

## 2024-11-30 PROCEDURE — 85004 AUTOMATED DIFF WBC COUNT: CPT | Performed by: EMERGENCY MEDICINE

## 2024-11-30 PROCEDURE — 250N000009 HC RX 250: Performed by: EMERGENCY MEDICINE

## 2024-11-30 PROCEDURE — 250N000011 HC RX IP 250 OP 636: Performed by: EMERGENCY MEDICINE

## 2024-11-30 RX ORDER — IOPAMIDOL 755 MG/ML
67 INJECTION, SOLUTION INTRAVASCULAR ONCE
Status: COMPLETED | OUTPATIENT
Start: 2024-11-30 | End: 2024-11-30

## 2024-11-30 RX ADMIN — IOPAMIDOL 67 ML: 755 INJECTION, SOLUTION INTRAVENOUS at 15:55

## 2024-11-30 RX ADMIN — SODIUM CHLORIDE 90 ML: 9 INJECTION, SOLUTION INTRAVENOUS at 15:55

## 2024-11-30 ASSESSMENT — ACTIVITIES OF DAILY LIVING (ADL)
ADLS_ACUITY_SCORE: 57

## 2024-11-30 NOTE — ED PROVIDER NOTES
Emergency Department Note      History of Present Illness     Chief Complaint  Altered Mental Status    HPI  Manan Bryant is a 69 year old male with a significant cardiac history who presents to the ER after 90 minutes earlier today of what sounds like significant confusion.  He was no longer aware of where some of his children lived, which does not typically happen.  He also states that he was unable to finish basic tasks on the computer that he typically does and that it took him much longer.  He states that he remembers starting a football game and then essentially became aware that the game was almost over and does not remember the time in between very well.  States that he has no numbness or tingling, no slurred speech, did talk to his wife who relayed the majority of these abnormal behaviors to him, did not relay any focal neurologic deficit.      Independent Historian  No    Review of External Notes  Yes I have reviewed the patient's last office visit note on 8 August of 2024 the patient was seen by his sleep medicine physician for central sleep apnea.      Past Medical History   Medical History and Problem List  Past Medical History:   Diagnosis Date    CAD (coronary artery disease)     Essential hypertension, benign     Family history of early CAD     History of acute anterior wall MI 2010    History of colonic polyps     Mixed hyperlipidemia     Myocardial infarction (H)     Paroxysmal supraventricular tachycardia (H)     PVC (premature ventricular contraction)     SVT (supraventricular tachycardia) (H)     Syncope        Medications  Cholecalciferol (VITAMIN D PO)  clopidogrel (PLAVIX) 75 MG tablet  dapagliflozin (FARXIGA) 10 MG TABS tablet  losartan (COZAAR) 100 MG tablet  metoprolol succinate ER (TOPROL XL) 50 MG 24 hr tablet  metoprolol tartrate (LOPRESSOR) 25 MG tablet  metroNIDAZOLE (METROGEL) 0.75 % external gel  Multiple Vitamin (MULTI-VITAMIN PO)  nitroGLYcerin (NITROSTAT) 0.4 MG sublingual  tablet  omeprazole (PRILOSEC) 20 MG DR capsule  rivaroxaban ANTICOAGULANT (XARELTO) 20 MG TABS tablet  rosuvastatin (CRESTOR) 40 MG tablet  triamterene-HCTZ (DYAZIDE) 37.5-25 MG capsule        Surgical History   Past Surgical History:   Procedure Laterality Date    COLONOSCOPY      CV CORONARY ANGIOGRAM N/A 12/29/2023    Procedure: Coronary Angiogram;  Surgeon: Edson Benson MD;  Location:  [facility]    CV PCI N/A 12/29/2023    Procedure: Percutaneous Coronary Intervention;  Surgeon: Edson Benson MD;  Location:  [facility]    STENT, CORONARY, EMEKA  8/2010    LAD         Physical Exam   Patient Vitals for the past 24 hrs:   BP Temp Temp src Pulse Resp SpO2   11/30/24 1409 (!) 166/72 97.7  F (36.5  C) Temporal 56 22 97 %       Physical Exam  Vitals: reviewed by me  General: Pt seen on [facility], pleasant, cooperative, and alert to conversation  Eyes: Tracking well, clear conjunctiva BL  ENT: MMM, midline trachea.   Lungs: No tachypnea, no accessory muscle use. No respiratory distress.   CV: Rate as above  MSK: no joint effusion.  No evidence of trauma  Skin: No rash  Neuro: Clear speech and no facial droop.  Moving all extremity spontaneously and following all commands.  Psych: Not RIS, no e/o AH/VH      Diagnostics   Lab Results   Labs Ordered and Resulted from Time of ED Arrival to Time of ED Departure   COMPREHENSIVE METABOLIC PANEL - Abnormal       Result Value    Sodium 139      Potassium 4.1      Carbon Dioxide (CO2) 21 (*)     Anion Gap 14      Urea Nitrogen 18.3      Creatinine 1.27 (*)     GFR Estimate 62      Calcium 9.2      Chloride 104      Glucose 168 (*)     Alkaline Phosphatase 115      AST 38      ALT 37      Protein Total 7.1      Albumin 4.4      Bilirubin Total 0.7     CBC WITH PLATELETS AND DIFFERENTIAL    WBC Count 8.2      RBC Count 5.61      Hemoglobin 17.0      Hematocrit 49.9      MCV 89      MCH 30.3      MCHC 34.1      RDW 13.1       Platelet Count 185      % Neutrophils 70      % Lymphocytes 20      % Monocytes 8      % Eosinophils 1      % Basophils 0      % Immature Granulocytes 0      NRBCs per 100 WBC 0      Absolute Neutrophils 5.7      Absolute Lymphocytes 1.6      Absolute Monocytes 0.7      Absolute Eosinophils 0.1      Absolute Basophils 0.0      Absolute Immature Granulocytes 0.0      Absolute NRBCs 0.0         Imaging  MR Brain w/o Contrast   Final Result   IMPRESSION:   1.  No evidence of acute intracranial hemorrhage, mass effect, or infarction.   2.  Moderate nonspecific white matter changes.         CTA Head Neck with Contrast   Final Result   IMPRESSION:    HEAD CTA:    1.  Normal CTA [place].      NECK CTA:   1.  Normal neck CTA.      CT Head w/o Contrast   Final Result   IMPRESSION:   1.  Normal head CT.                  Independent Interpretation  Yes I have independently reviewed the patient's CT scan of the head, no obvious hemorrhage noted.      ED Course      Medications Administered   Medications   iopamidol (ISOVUE-370) solution 67 mL (67 mLs Intravenous $Given 11/30/24 1555)   Saline (90 mLs As instructed $Given 11/30/24 1555)          Discussion of Management   Yes I discussed the case with the stroke neurologist on-call, Dr. Gallegos.  Patient was gone before Dr. Gallegos he could see the patient in person but we did discuss my plan.        Optional/Additional Documentation  None      Medical Decision Making / Diagnosis         MDM  This is a pleasant 69-year-old gentleman who presents to the emergency room with what appears to be possible transient global amnesia versus a TIA versus and other nonspecific transient cause of confusion.  It certainly does not seem like he had a focal neurologic deficit and this is more consistent with TGA in my mind.  Nonetheless we did do a thorough workup including vessel imaging and an MRI which is all quite reassuring.  I do not see any evidence of sepsis, his vitals are  reassuring and I do appreciate his heart rate, but he tells me that is baseline for him.  He does have significant cardiac history and is also already on an antiplatelet agent and a DOAC and based on my brief conversation with the neurology fellow, we agreed that outpatient follow-up was appropriate.  I do not see much to be gained from having this asymptomatic gentleman coming to the hospital at this time and he does feel comfortable going home.  He is highly reliable appearing and I do think that he will be in touch with Dr. Gene Serrano in the next several days to go over next steps to get a referral and have also given him the number for the [facility] neurology group if he wants to call them directly and set up an appointment.  Red flags for when to come back to the ER were discussed in detail.    ICD-10 Codes:    ICD-10-CM    1. TIA (transient ischemic attack)  G45.9       2. Acute confusional state  F05                     Marcos Ahn MD  11/30/24 1924

## 2024-11-30 NOTE — ED NOTES
"  [facility]    Stroke Telephone Note    I was called by Marcos Ahn on 11/30/24 regarding patient Manan Bryant. The patient is a 69 year old male with PMHx of AF on Xarelto, HTN, MI and HLD presents w/ transient confusion. LKW 1100 AM. Now back to baseline since 12:30 PM. I was called by ED provider to know if they could discharge the patient with General Neurology follow up, I did say that we would call back after staffing with attending.    Vitals  BP: 131/65   Pulse: (!) 49   Resp: 23   Temp: 97.7  F (36.5  C)        Imaging Findings  CT head: no hemorrhage   CTA head/neck: no lvo  MRI brain w/wo con neg for any ischemic stroke.     Impression    Transient confusion (Resolved)    Patient was discharged before we called back         My recommendations are based on the information provided over the phone by Manan Bryant's in-person providers. They are not intended to replace the clinical judgment of his in-person providers. I was not requested to personally see or examine the patient at this time.     The Stroke Staff is Dr. SEAN White MD  Vascular Neurology Fellow    To page me or covering stroke neurology team member, click here: [facility]  Choose \"On Call\" tab at top, then select \"NEUROLOGY/ALL SITES\" from middle drop-down box, press Enter, then look for \"stroke\" or \"telestroke\" for your site.   "

## 2024-11-30 NOTE — ED TRIAGE NOTES
Pt reports at 1100am this morning went upstairs to wife - did not know the day, did not know where his kids live, confusion resolved around 12:30pm. On Xarelto - hx of 1x episode of AFIB. Pt reports feels like his baseline at current time in Triage     Triage Assessment (Adult)       Row Name 11/30/24 8534          Triage Assessment    Airway WDL WDL        Cognitive/Neuro/Behavioral WDL    Cognitive/Neuro/Behavioral WDL WDL

## 2024-12-01 NOTE — DISCHARGE INSTRUCTIONS
I have discussed, no clear cause of your symptoms was found here today but thankfully your CT scans were reassuring and there is no evidence of a stroke on the MRI.  Please do call your regular doctor in the next 2 days to set up an appointment to go over your results and go over next steps, please also try and call the neurology number that we have included here in your paperwork as you may be able to schedule an appointment with them directly in the next 1 to 2 weeks.  Come back to the ER immediately with any other concerns you have.

## 2024-12-22 ENCOUNTER — HEALTH MAINTENANCE LETTER (OUTPATIENT)
Age: 69
End: 2024-12-22

## 2025-01-03 ENCOUNTER — LAB (OUTPATIENT)
Dept: LAB | Facility: CLINIC | Age: 70
End: 2025-01-03
Payer: MEDICARE

## 2025-01-03 DIAGNOSIS — I10 ESSENTIAL HYPERTENSION, MALIGNANT: ICD-10-CM

## 2025-01-03 DIAGNOSIS — N18.2 CHRONIC KIDNEY DISEASE, STAGE II (MILD): ICD-10-CM

## 2025-01-03 DIAGNOSIS — R73.03 DIABETES MELLITUS, LATENT: ICD-10-CM

## 2025-01-03 LAB
ALBUMIN MFR UR ELPH: 7.7 MG/DL
ALBUMIN SERPL BCG-MCNC: 4.4 G/DL (ref 3.5–5.2)
ALBUMIN UR-MCNC: NEGATIVE MG/DL
ANION GAP SERPL CALCULATED.3IONS-SCNC: 11 MMOL/L (ref 7–15)
APPEARANCE UR: CLEAR
BILIRUB UR QL STRIP: NEGATIVE
BUN SERPL-MCNC: 20.8 MG/DL (ref 8–23)
CALCIUM SERPL-MCNC: 9.7 MG/DL (ref 8.8–10.4)
CHLORIDE SERPL-SCNC: 105 MMOL/L (ref 98–107)
COLOR UR AUTO: YELLOW
CREAT SERPL-MCNC: 1.31 MG/DL (ref 0.67–1.17)
CREAT UR-MCNC: 145 MG/DL
EGFRCR SERPLBLD CKD-EPI 2021: 60 ML/MIN/1.73M2
EST. AVERAGE GLUCOSE BLD GHB EST-MCNC: 134 MG/DL
GLUCOSE SERPL-MCNC: 101 MG/DL (ref 70–99)
GLUCOSE UR STRIP-MCNC: >=1000 MG/DL
HBA1C MFR BLD: 6.3 % (ref 0–5.6)
HCO3 SERPL-SCNC: 25 MMOL/L (ref 22–29)
HGB UR QL STRIP: NEGATIVE
KETONES UR STRIP-MCNC: NEGATIVE MG/DL
LEUKOCYTE ESTERASE UR QL STRIP: NEGATIVE
MAGNESIUM SERPL-MCNC: 2 MG/DL (ref 1.7–2.3)
NITRATE UR QL: NEGATIVE
PH UR STRIP: 5.5 [PH] (ref 5–7)
PHOSPHATE SERPL-MCNC: 3 MG/DL (ref 2.5–4.5)
POTASSIUM SERPL-SCNC: 4.3 MMOL/L (ref 3.4–5.3)
PROT/CREAT 24H UR: 0.05 MG/MG CR (ref 0–0.2)
RBC #/AREA URNS AUTO: NORMAL /HPF
SODIUM SERPL-SCNC: 141 MMOL/L (ref 135–145)
SP GR UR STRIP: 1.02 (ref 1–1.03)
UROBILINOGEN UR STRIP-ACNC: 0.2 E.U./DL
WBC #/AREA URNS AUTO: NORMAL /HPF

## 2025-01-03 PROCEDURE — 81001 URINALYSIS AUTO W/SCOPE: CPT

## 2025-01-03 PROCEDURE — 84156 ASSAY OF PROTEIN URINE: CPT

## 2025-01-03 PROCEDURE — 83735 ASSAY OF MAGNESIUM: CPT

## 2025-01-03 PROCEDURE — 36415 COLL VENOUS BLD VENIPUNCTURE: CPT

## 2025-01-03 PROCEDURE — 83036 HEMOGLOBIN GLYCOSYLATED A1C: CPT | Mod: GZ

## 2025-01-03 PROCEDURE — 80069 RENAL FUNCTION PANEL: CPT

## 2025-02-08 DIAGNOSIS — K21.9 GASTROESOPHAGEAL REFLUX DISEASE WITHOUT ESOPHAGITIS: ICD-10-CM

## 2025-02-10 DIAGNOSIS — I25.10 CORONARY ARTERY DISEASE INVOLVING NATIVE CORONARY ARTERY OF NATIVE HEART WITHOUT ANGINA PECTORIS: ICD-10-CM

## 2025-02-10 DIAGNOSIS — I10 BENIGN ESSENTIAL HYPERTENSION: ICD-10-CM

## 2025-02-10 DIAGNOSIS — E78.5 HYPERLIPIDEMIA LDL GOAL <70: Chronic | ICD-10-CM

## 2025-02-10 RX ORDER — ROSUVASTATIN CALCIUM 40 MG/1
40 TABLET, COATED ORAL DAILY
Qty: 90 TABLET | Refills: 0 | Status: SHIPPED | OUTPATIENT
Start: 2025-02-10

## 2025-02-10 RX ORDER — OMEPRAZOLE 20 MG/1
20 CAPSULE, DELAYED RELEASE ORAL DAILY
Qty: 90 CAPSULE | Refills: 3 | Status: SHIPPED | OUTPATIENT
Start: 2025-02-10

## 2025-02-10 RX ORDER — LOSARTAN POTASSIUM 100 MG/1
100 TABLET ORAL DAILY
Qty: 90 TABLET | Refills: 0 | Status: SHIPPED | OUTPATIENT
Start: 2025-02-10

## 2025-02-10 RX ORDER — CLOPIDOGREL BISULFATE 75 MG/1
75 TABLET ORAL DAILY
Qty: 90 TABLET | Refills: 0 | Status: SHIPPED | OUTPATIENT
Start: 2025-02-10

## 2025-03-18 ENCOUNTER — LAB (OUTPATIENT)
Dept: LAB | Facility: CLINIC | Age: 70
End: 2025-03-18
Payer: MEDICARE

## 2025-03-18 DIAGNOSIS — E78.5 HYPERLIPIDEMIA LDL GOAL <70: ICD-10-CM

## 2025-03-18 DIAGNOSIS — I10 BENIGN ESSENTIAL HYPERTENSION: ICD-10-CM

## 2025-03-18 DIAGNOSIS — I25.10 CORONARY ARTERY DISEASE INVOLVING NATIVE CORONARY ARTERY OF NATIVE HEART WITHOUT ANGINA PECTORIS: ICD-10-CM

## 2025-03-18 PROCEDURE — 80048 BASIC METABOLIC PNL TOTAL CA: CPT

## 2025-03-18 PROCEDURE — 80061 LIPID PANEL: CPT

## 2025-03-18 PROCEDURE — 84460 ALANINE AMINO (ALT) (SGPT): CPT

## 2025-03-18 PROCEDURE — 36415 COLL VENOUS BLD VENIPUNCTURE: CPT

## 2025-03-19 LAB
ALT SERPL W P-5'-P-CCNC: 31 U/L (ref 0–70)
ANION GAP SERPL CALCULATED.3IONS-SCNC: 13 MMOL/L (ref 7–15)
BUN SERPL-MCNC: 22.5 MG/DL (ref 8–23)
CALCIUM SERPL-MCNC: 9.7 MG/DL (ref 8.8–10.4)
CHLORIDE SERPL-SCNC: 108 MMOL/L (ref 98–107)
CHOLEST SERPL-MCNC: 90 MG/DL
CREAT SERPL-MCNC: 1.37 MG/DL (ref 0.67–1.17)
EGFRCR SERPLBLD CKD-EPI 2021: 57 ML/MIN/1.73M2
FASTING STATUS PATIENT QL REPORTED: YES
FASTING STATUS PATIENT QL REPORTED: YES
GLUCOSE SERPL-MCNC: 88 MG/DL (ref 70–99)
HCO3 SERPL-SCNC: 21 MMOL/L (ref 22–29)
HDLC SERPL-MCNC: 44 MG/DL
LDLC SERPL CALC-MCNC: 33 MG/DL
NONHDLC SERPL-MCNC: 46 MG/DL
POTASSIUM SERPL-SCNC: 4.4 MMOL/L (ref 3.4–5.3)
SODIUM SERPL-SCNC: 142 MMOL/L (ref 135–145)
TRIGL SERPL-MCNC: 66 MG/DL

## 2025-03-21 PROBLEM — I48.0 PAROXYSMAL ATRIAL FIBRILLATION (H): Status: ACTIVE | Noted: 2023-12-28

## 2025-04-01 ENCOUNTER — MYC MEDICAL ADVICE (OUTPATIENT)
Dept: FAMILY MEDICINE | Facility: CLINIC | Age: 70
End: 2025-04-01
Payer: MEDICARE

## 2025-04-02 ENCOUNTER — E-VISIT (OUTPATIENT)
Dept: FAMILY MEDICINE | Facility: CLINIC | Age: 70
End: 2025-04-02
Payer: MEDICARE

## 2025-04-02 DIAGNOSIS — M54.9 BACK PAIN, UNSPECIFIED BACK LOCATION, UNSPECIFIED BACK PAIN LATERALITY, UNSPECIFIED CHRONICITY: Primary | ICD-10-CM

## 2025-04-03 ENCOUNTER — THERAPY VISIT (OUTPATIENT)
Dept: PHYSICAL THERAPY | Facility: CLINIC | Age: 70
End: 2025-04-03
Attending: INTERNAL MEDICINE
Payer: MEDICARE

## 2025-04-03 DIAGNOSIS — M54.9 BACK PAIN, UNSPECIFIED BACK LOCATION, UNSPECIFIED BACK PAIN LATERALITY, UNSPECIFIED CHRONICITY: ICD-10-CM

## 2025-04-03 NOTE — PROGRESS NOTES
"PHYSICAL THERAPY EVALUATION  Type of Visit: Evaluation       Fall Risk Screen:  Fall screen completed by: PT  Have you fallen 2 or more times in the past year?: No  Have you fallen and had an injury in the past year?: No  Is patient a fall risk?: No    Subjective         Presenting condition or subjective complaint: L low back    Reports chronic, recurrent L sided low back pain for the past 10 years. About 7 years ago it \"spasmed\" so bad that he couldn't get off the floor and had to call 911. This improved with a muscle relaxant. More recently (4-6 weeks ago) he has been feeling stiffer through the L low back with smaller, more frequent \"spasms\". Unsure of exact cause of worsening; however, did recently purchase a new bike (wonders if it isn't fit correctly) or possibly due to a recent switch in mattress which isn't as firm as he would prefer. Spasms increase with sitting (15 min), driving, lifting/carrying with arms extended. Walking (30 min), bike rides (a couple hours), and golf (18 holes w/ a cart) cause his L low back to \"fatigue\". The \"fatigue\" is the pre \"spasm\" state. Denies radicular pain, numbness or tingling. No noticeable weakness on his L LE. Pain improves with a warm bath.    Golfs 3-6x/wk month in the summer, bikes 2x/wk weather dependent, light weight strength training from home.    Date of onset: 02/20/25    Relevant medical history: Arthritis; Heart problems; High blood pressure; Implanted device; Kidney disease; Osteoarthritis; Overweight; Sleep disorder like apnea   Dates & types of surgery: Two heart stents - December 2023    Prior diagnostic imaging/testing results: MRI       Lumbar MRI 12/2019  FINDINGS:   Five lumbar vertebral bodies are presumed for the purposes of this  report. Alignment is within normal limits. No acute fracture. No  concerning focal marrow signal abnormalities.     Diffuse disc desiccation, with mild disc height loss at L3-L4, L4-L5,  and L5-S1. Conus terminates at L1. " Paraspinous soft tissues appear  within normal limits.     Segmental analysis:  T12-L1: No disc bulge or herniation. Normal facets. No spinal or  neural foraminal stenosis.     L1-L2: No significant disc bulge or herniation. Minimal facet  arthropathy. No spinal or neural foraminal stenosis.     L2-L3: Shallow bilobed disc bulge with mild facet arthropathy. No  spinal or neural foraminal stenosis.     L3-L4: Small to medium sized symmetric disc bulge with mild facet  arthropathy and mild prominence of the dorsal epidural fat. This  results in mild-to-moderate left lateral recess narrowing and mild  overall spinal canal narrowing. Moderate left and mild right neural  foraminal stenosis.     L4-L5: Medium sized symmetric disc bulge with superimposed  central/right central disc protrusion. Moderate bilateral facet  arthropathy and ligamentum flavum thickening. This results in  bilateral lateral recess stenosis with moderate overall spinal  stenosis. Mild-to-moderate bilateral neural foraminal stenosis.     L5-S1: There is a small disc bulge with superimposed tiny right  central disc protrusion centrally with annular fissure. Mild to  moderate bilateral facet arthropathy. No spinal or significant neural  foraminal stenosis.  Prior therapy history for the same diagnosis, illness or injury: Yes Given certain exercises to perform at home    Prior Level of Function  Transfers: Independent  Ambulation: Independent  ADL: Independent  IADL:     Living Environment  Social support: With a significant other or spouse   Type of home: House   Stairs to enter the home: Yes 4 Is there a railing: No     Ramp: No   Stairs inside the home: Yes 2     Help at home: None  Equipment owned:       Employment: No    Hobbies/Interests: Golf, biking, hiking    Patient goals for therapy:      Pain assessment: Location: L low back/Ratin/10 seated at rest currently, 4/10 at worst in the past couple of months     Objective     Standing  Alignment:    Cervical/Thoracic:  Forward head and thoracic kyphosis increased  Shoulder/UE:  Rounded shoulders  Lumbar:  Lordosis decr      Knee:  Genu varus L and genu varus R          Flexibility/Screens:     Upper Extremity:    Decreased left upper extremity flexibility at:  Pectoralis Major and Pectoralis Minor    Decreased right upper extremity flexibility present at:  Pectoralis Major and Pectoralis Minor  Lower Extremity:  Decreased left lower extremity flexibility:Hip Flexors and Hamstrings    Decreased right lower extremity flexibility:  Hip Flexors and Hamstrings           Lumbar/SI Evaluation  ROM:  Arom wnl lumbar: REIL: ROM improves, pain improves w/ reps.  AROM Lumbar:   Flexion:              Knees (no pain)  Ext:                    50% (no pain)   Side Bend:        Left:     Right:   Rotation:           Left:     Right:   Side Glide:        Left:     Right:         Strength: TrA Contraction: fair - , compensates w/ obliques  Lumbar Myotomes:    T12-L3 (Hip Flex):  Left: 5    Right: 5-  L2-4 (Quads):  Left:  5    Right:  5-  L4 (Ankle DF):  Left:  5    Right:  5  L5 (Great Toe Ext): Left: 5    Right: 5   S1 (Toe Raise):  Left: 5    Right: 5        Neural Tension/Mobility:        Left side:SLR w/DF or Femoral Nerve  negative.     Right side:   SLR w/DF or Femoral Nerve  negative.   Lumbar Palpation:    Tenderness present at Left:    Quadratus Lumborum; Erector Spinae and PSIS        Spinal Segmental Conclusions:     Level:  noted at T10, T11, T12, L1, L2, L3, L4 and L5           Cervical/Thoracic Evaluation                    Spinal Segmental Conclusions:    Level:   at T8, T9, T7 and T10                                     Hip Evaluation        Hip Strength:      Extension:  Left:  4/5   Pain:Right:  4/5     Pain:                                   General       Assessment & Plan   CLINICAL IMPRESSIONS  Medical Diagnosis: Back pain, unspecified back location, unspecified back pain laterality,  unspecified chronicity    Treatment Diagnosis: Back pain, unspecified back location, unspecified back pain laterality, unspecified chronicity   Impression/Assessment: Patient is a 69 year old male with L sided low back complaints.  The following significant findings have been identified: Pain, Decreased ROM/flexibility, Decreased joint mobility, Decreased strength, Impaired muscle performance, Decreased activity tolerance, and Impaired posture. These impairments interfere with their ability to perform self care tasks, recreational activities, household chores, driving , household mobility, and community mobility as compared to previous level of function.     Clinical Decision Making (Complexity):  Clinical Presentation: Evolving/Changing  Clinical Presentation Rationale: based on medical and personal factors listed in PT evaluation  Clinical Decision Making (Complexity): Moderate complexity    PLAN OF CARE  Treatment Interventions:  Modalities: Cupping, Mechanical Traction  Interventions: Manual Therapy, Neuromuscular Re-education, Therapeutic Activity, Therapeutic Exercise, Self-Care/Home Management    Long Term Goals     PT Goal 1  Goal Identifier: sitting  Goal Description: Patient will be able to sit for 60 minutes without feeling his back spasm, fatigue or stiffen up in order for rest, travel and eating.  Goal Progress: 15 min currently  Target Date: 05/29/25      Frequency of Treatment: 1x/wk for 4 weeks, then every other week for 4 weeks  Duration of Treatment: 8 weeks    Recommended Referrals to Other Professionals:   Education Assessment:   Learner/Method: Patient;Listening;Demonstration;Pictures/Video  Education Comments: Educated on findings of exam, anatomy, theory of directional preference, importance of HEP, likely frequency/duration of PT.    Risks and benefits of evaluation/treatment have been explained.   Patient/Family/caregiver agrees with Plan of Care.     Evaluation Time:     PT Eval, Low  Complexity Minutes (93744): 27       Signing Clinician: JONAS Alvarado [facility]                                                                                   OUTPATIENT PHYSICAL THERAPY      PLAN OF TREATMENT FOR OUTPATIENT REHABILITATION   Patient's Last Name, First Name, Manan Joe YOB: 1955   Provider's Name   [facility]   Medical Record No.  [id number]     Onset Date: 02/20/25  Start of Care Date: 04/03/25     Medical Diagnosis:  Back pain, unspecified back location, unspecified back pain laterality, unspecified chronicity      PT Treatment Diagnosis:  Back pain, unspecified back location, unspecified back pain laterality, unspecified chronicity Plan of Treatment  Frequency/Duration: 1x/wk for 4 weeks, then every other week for 4 weeks/ 8 weeks    Certification date from 04/03/25 to 05/29/25         See note for plan of treatment details and functional goals     Nena Ruby PT                         I CERTIFY THE NEED FOR THESE SERVICES FURNISHED UNDER        THIS PLAN OF TREATMENT AND WHILE UNDER MY CARE     (Physician attestation of this document indicates review and certification of the therapy plan).              Referring Provider:  Shade Almazan    Initial Assessment  See Epic Evaluation- Start of Care Date: 04/03/25

## 2025-04-10 ENCOUNTER — THERAPY VISIT (OUTPATIENT)
Dept: PHYSICAL THERAPY | Facility: CLINIC | Age: 70
End: 2025-04-10
Payer: MEDICARE

## 2025-04-10 DIAGNOSIS — M54.9 BACK PAIN, UNSPECIFIED BACK LOCATION, UNSPECIFIED BACK PAIN LATERALITY, UNSPECIFIED CHRONICITY: Primary | ICD-10-CM

## 2025-04-17 ENCOUNTER — THERAPY VISIT (OUTPATIENT)
Dept: PHYSICAL THERAPY | Facility: CLINIC | Age: 70
End: 2025-04-17
Attending: INTERNAL MEDICINE
Payer: MEDICARE

## 2025-04-17 DIAGNOSIS — M54.9 BACK PAIN, UNSPECIFIED BACK LOCATION, UNSPECIFIED BACK PAIN LATERALITY, UNSPECIFIED CHRONICITY: Primary | ICD-10-CM

## 2025-04-24 ENCOUNTER — THERAPY VISIT (OUTPATIENT)
Dept: PHYSICAL THERAPY | Facility: CLINIC | Age: 70
End: 2025-04-24
Attending: INTERNAL MEDICINE
Payer: MEDICARE

## 2025-04-24 DIAGNOSIS — M54.9 BACK PAIN, UNSPECIFIED BACK LOCATION, UNSPECIFIED BACK PAIN LATERALITY, UNSPECIFIED CHRONICITY: Primary | ICD-10-CM

## 2025-04-24 NOTE — PROGRESS NOTES
"  PLAN  Continue therapy per current plan of care.    Recommend speaking with primary care about a Lumbar MRI next week at video appointment as it has been 6 years since last MRI which demonstrated both arthritis and disc bulges.    Beginning/End Dates of Progress Note Reporting Period:  04/03/25 to 04/24/2025    Referring Provider:  Shade Almazan       04/24/25 0500   Appointment Info   Signing clinician's name / credentials Nena Ruby, PT, DPT   Total/Authorized Visits 8 (E&T)   Visits Used 4   Medical Diagnosis Back pain, unspecified back location, unspecified back pain laterality, unspecified chronicity   PT Tx Diagnosis Back pain, unspecified back location, unspecified back pain laterality, unspecified chronicity   Progress Note/Certification   Start of Care Date 04/03/25   Onset of illness/injury or Date of Surgery 02/20/25   Therapy Frequency 1x/wk for 4 weeks, then every other week for 4 weeks   Predicted Duration 8 weeks   Certification date from 04/03/25   Certification date to 05/29/25   Progress Note Due Date 05/29/25   Progress Note Completed Date 04/03/25   PT Goal 1   Goal Identifier sitting   Goal Description Patient will be able to sit for 60 minutes without feeling his back spasm, fatigue or stiffen up in order for rest, travel and eating.   Goal Progress 30 min then pain returning to standing   Target Date 05/29/25   Subjective Report   Subjective Report Back soreness increased again yesterday. Went golfing on Tuesday, only 4 holes at about 80% full swings, with \"senior\"/lighter clubs. Woke up ok Wednesday morning, a little bit of pain. Then was working in the yard, cutting trees with the chainsaw, but using a  to remove the trees. Was working for 3 hours without breaks. Pain increased more following this work, didn't do his stretches while working on the trees or golfing. However, has continued them daily. Today the L low back is sore, feels it is on the verge of spasming. Was able to " perform his HEP this morning without increase in pain. Sees his PCP soon, wondering if he should have updated imaging.   Objective Measures   Objective Measures Objective Measure 1;Objective Measure 2   Objective Measure 1   Objective Measure AROM Lumbar   Details Flx: midshins L sided stretch, Ext: slowly today gets to 50% painful   Objective Measure 2   Objective Measure Palpation   Details mildly tender to L low back near PSIS, increased muscle tension through L QL and lower erector spinae   Treatment Interventions (PT)   Interventions Therapeutic Procedure/Exercise;Neuromuscular Re-education;Manual Therapy;Therapeutic Activity   Therapeutic Procedure/Exercise   Therapeutic Procedures: strength, endurance, ROM, flexibility minutes (44872) 10   PTRx Ther Proc 1 Supine Lumbar Hip Roll   PTRx Ther Proc 1 - Details 5 sec x 10 reps B   PTRx Ther Proc 2 Prone Press Ups   PTRx Ther Proc 2 - Details x 10 reps, 2 sets ROM improves and pain reduces   PTRx Ther Proc 3 Bridges with Theraband   PTRx Ther Proc 3 - Details GTB w hip abd x 10 reps vc for GS   Skilled Intervention Progression of skilled HEP focusing on repeated motion and strength with cues for form and proper muscle activation as noted above.   Patient Response/Progress tolerates well without increase in pain, fatigues appopriately   Ther Proc 1 Standing Lumbar Ext at plinth   Ther Proc 1 - Details x 10 reps - can be used in place of press ups when unable to lay down - if tolerated   Therapeutic Activity   Ther Act 1 Foam Roller Shoulder Flexion/Horizontal Abduction   PTRx Ther Act 1 Foam Roller Thoracic Extension   Skilled Intervention education performed while performing manual therapy   Neuromuscular Re-education   Neuromuscular re-ed of mvmt, balance, coord, kinesthetic sense, posture, proprioception minutes (31660) 22   PTRx Neuro Re-ed 1 Quadruped Tranversus Abdominis Activation   PTRx Neuro Re-ed 2 All 4s Arm Lift   PTRx Neuro Re-ed 2 - Details x 10 reps  vc/mc for TrA and to avoid rotation   PTRx Neuro Re-ed 3 All 4s Leg Lift   PTRx Neuro Re-ed 3 - Details x 10 reps vc/vsc for TrA and to avoid lumbopelvic rotation   PTRx Neuro Re-ed 4 Fire Hydrant   PTRx Neuro Re-ed 4 - Details x 10 reps B vc/vsc mc to avoid lumbopelvic rotation   PTRx Neuro Re-ed 5 Pallof Press   PTRx Neuro Re-ed 5 - Details GTB doubled x 10 reps B vc/vsc for slight knee bend core engage   Skilled Intervention Progression of skilled HEP focusing on strength with cues for form and proper muscle activation as noted above.   Patient Response/Progress tolerates well, TrA improves in quadruped compared to supine   PTRx Neuro Re-ed 6 Shoulder Theraband Rows   PTRx Neuro Re-ed 7 Shoulder Theraband Low Row/Pulldown   Manual Therapy   Manual Therapy: Mobilization, MFR, MLD, friction massage minutes (03130) 8   Manual Therapy 1 Prone PA mobs   Patient Response/Progress reports relief following   Manual Therapy 2 MFR   Manual Therapy 2 - Details Pt in prone, to L QL, erector spinae and proximal glutes   Manual Therapy Manual Therapy 2   Education   Learner/Method Patient;Listening;Demonstration;Pictures/Video   Plan   Home program see PTrx videos   Plan for next session progress core, glute and upper back strength, manual as needed   Total Session Time   Timed Code Treatment Minutes 40   Total Treatment Time (sum of timed and untimed services) 40

## 2025-04-30 ENCOUNTER — VIRTUAL VISIT (OUTPATIENT)
Dept: FAMILY MEDICINE | Facility: CLINIC | Age: 70
End: 2025-04-30
Payer: MEDICARE

## 2025-04-30 DIAGNOSIS — L51.9 ERYTHEMA MULTIFORME: ICD-10-CM

## 2025-04-30 DIAGNOSIS — M51.369 DEGENERATION OF INTERVERTEBRAL DISC OF LUMBAR REGION, UNSPECIFIED WHETHER PAIN PRESENT: Primary | ICD-10-CM

## 2025-04-30 PROCEDURE — 1125F AMNT PAIN NOTED PAIN PRSNT: CPT | Mod: 95 | Performed by: INTERNAL MEDICINE

## 2025-04-30 PROCEDURE — 98005 SYNCH AUDIO-VIDEO EST LOW 20: CPT | Performed by: INTERNAL MEDICINE

## 2025-04-30 RX ORDER — DOXYCYCLINE 100 MG/1
100 CAPSULE ORAL 2 TIMES DAILY
Qty: 42 CAPSULE | Refills: 0 | Status: SHIPPED | OUTPATIENT
Start: 2025-04-30

## 2025-04-30 RX ORDER — CYCLOBENZAPRINE HCL 10 MG
10 TABLET ORAL 3 TIMES DAILY PRN
Qty: 30 TABLET | Refills: 3 | Status: SHIPPED | OUTPATIENT
Start: 2025-04-30

## 2025-04-30 NOTE — PROGRESS NOTES
Daquan is a 69 year old who is being evaluated via a billable video visit.    How would you like to obtain your AVS? MyChart  If the video visit is dropped, the invitation should be resent by: Text to cell phone: [phone]  Will anyone else be joining your video visit? No          Subjective   Daquan is a 69 year old, presenting for the following health issues:  No chief complaint on file.      Video Start Time: 2:42 PM    History of Present Illness       Reason for visit:  PT referral for low back pain (left side)   He is taking medications regularly.        Acute low back pain   Manan Bryant has had a prior history of back spasm 8 years ago that resolved with muscle relaxants.  He has had MRI of the back in 2019 showing degenerative disc disease lumbar spine.  He has been going to physical therapy over the past 4 weeks, notes mild improvement and now a bit worse.  He is working with physical therapy and would like to use flexeril as needed.        Review of Systems  Constitutional, HEENT, cardiovascular, pulmonary, GI, , musculoskeletal, neuro, skin, endocrine and psych systems are negative, except as otherwise noted.      Objective    Vitals - Patient Reported  Systolic (Patient Reported): (!) 142  Diastolic (Patient Reported): 85  Pain Score: Mild Pain (3)  Pain Loc: Low Back (left side)      Vitals:  No vitals were obtained today due to virtual visit.    Physical Exam   GENERAL: alert and no distress  EYES: Eyes grossly normal to inspection.  No discharge or erythema, or obvious scleral/conjunctival abnormalities.  RESP: No audible wheeze, cough, or visible cyanosis.    SKIN: Erythematous rash with central clearing behind the right knee  NEURO: Cranial nerves grossly intact.  Mentation and speech appropriate for age.  PSYCH: Appropriate affect, tone, and pace of words    (M51.369) Degeneration of intervertebral disc of lumbar region, unspecified whether pain present  (primary encounter diagnosis)  Comment:  We will refer for MRI of lumbar spine and okay for trial of Flexeril  Plan: MR Lumbar Spine w/o Contrast, cyclobenzaprine         (FLEXERIL) 10 MG tablet            (L51.9) Erythema multiforme  Comment: We will also treat what looks like to be erythema multiforme a with doxycycline 100 mg x 3 weeks  Plan: doxycycline hyclate (VIBRAMYCIN) 100 MG capsule                 Video-Visit Details    Type of service:  Video Visit   Video End Time:2:56 PM  Originating Location (pt. Location): Home    Distant Location (provider location):  On-site  Platform used for Video Visit: Tiffany  Signed Electronically by: Ric Villela MD, MD

## 2025-05-13 ENCOUNTER — HOSPITAL ENCOUNTER (OUTPATIENT)
Dept: MRI IMAGING | Facility: CLINIC | Age: 70
Discharge: HOME | End: 2025-05-13
Attending: INTERNAL MEDICINE
Payer: MEDICARE

## 2025-05-13 DIAGNOSIS — M51.369 DEGENERATION OF INTERVERTEBRAL DISC OF LUMBAR REGION, UNSPECIFIED WHETHER PAIN PRESENT: ICD-10-CM

## 2025-05-13 PROCEDURE — 72148 MRI LUMBAR SPINE W/O DYE: CPT

## 2025-05-14 ENCOUNTER — RESULTS FOLLOW-UP (OUTPATIENT)
Dept: FAMILY MEDICINE | Facility: CLINIC | Age: 70
End: 2025-05-14

## 2025-05-14 NOTE — RESULT ENCOUNTER NOTE
Hello -    I'm covering for Dr. Villela today    Here are my comments about the recent results: your MRI results show degenerative disc disease and spondylosis (spine arthritis) at the level of the lumbar spine.       Please let us know if you have any questions or concerns.    If you have a question about the results, please use the ? (question herbert) option at the upper right corner.     Regards,  Jesica Christina PA-C

## 2025-05-20 ENCOUNTER — MYC MEDICAL ADVICE (OUTPATIENT)
Dept: CARDIOLOGY | Facility: CLINIC | Age: 70
End: 2025-05-20
Payer: MEDICARE

## 2025-05-20 DIAGNOSIS — I25.10 CORONARY ARTERY DISEASE INVOLVING NATIVE CORONARY ARTERY OF NATIVE HEART WITHOUT ANGINA PECTORIS: ICD-10-CM

## 2025-05-20 DIAGNOSIS — I10 BENIGN ESSENTIAL HYPERTENSION: ICD-10-CM

## 2025-05-20 DIAGNOSIS — I48.0 PAROXYSMAL ATRIAL FIBRILLATION (H): Primary | ICD-10-CM

## 2025-05-20 DIAGNOSIS — I25.10 CORONARY ARTERY DISEASE INVOLVING NATIVE CORONARY ARTERY OF NATIVE HEART WITHOUT ANGINA PECTORIS: Chronic | ICD-10-CM

## 2025-05-20 DIAGNOSIS — E78.5 HYPERLIPIDEMIA LDL GOAL <70: Chronic | ICD-10-CM

## 2025-05-20 RX ORDER — METOPROLOL SUCCINATE 50 MG/1
50 TABLET, EXTENDED RELEASE ORAL DAILY
Qty: 90 TABLET | Refills: 3 | Status: SHIPPED | OUTPATIENT
Start: 2025-05-20

## 2025-05-20 RX ORDER — ROSUVASTATIN CALCIUM 40 MG/1
40 TABLET, COATED ORAL DAILY
Qty: 90 TABLET | Refills: 3 | Status: SHIPPED | OUTPATIENT
Start: 2025-05-20

## 2025-05-20 RX ORDER — LOSARTAN POTASSIUM 100 MG/1
100 TABLET ORAL DAILY
Qty: 90 TABLET | Refills: 3 | Status: SHIPPED | OUTPATIENT
Start: 2025-05-20

## 2025-05-20 NOTE — TELEPHONE ENCOUNTER
My chart message from patient:  I saw Dr. Vasquez recently, but am writing you as I k know he was close to shelter.     I need refills (90 x 4) on my Lorsartin, Metoprolol, and Rosuvaststin.  He also wanted me to continue on my Xarelto, and I didn t see a renewal for that one either.     I run out of the first three tomorrow.     [facility] says I cannot renew online, but given my recent appointment with Dr. Vasquez, I am hoping you can renew them for me.     Thank you!     Daquan Bryant  ===============    Toprol XL script filled today by refill team.    Rx escripted for losartan, crestor and xarelto    Update sent to patient.

## 2025-05-29 ENCOUNTER — ANCILLARY PROCEDURE (OUTPATIENT)
Dept: GENERAL RADIOLOGY | Facility: CLINIC | Age: 70
End: 2025-05-29
Attending: PHYSICIAN ASSISTANT
Payer: MEDICARE

## 2025-05-29 ENCOUNTER — OFFICE VISIT (OUTPATIENT)
Dept: URGENT CARE | Facility: URGENT CARE | Age: 70
End: 2025-05-29
Payer: MEDICARE

## 2025-05-29 VITALS
DIASTOLIC BLOOD PRESSURE: 70 MMHG | SYSTOLIC BLOOD PRESSURE: 110 MMHG | HEIGHT: 71 IN | BODY MASS INDEX: 29.54 KG/M2 | TEMPERATURE: 97.7 F | WEIGHT: 211 LBS | OXYGEN SATURATION: 96 % | HEART RATE: 55 BPM | RESPIRATION RATE: 17 BRPM

## 2025-05-29 DIAGNOSIS — R05.1 ACUTE COUGH: Primary | ICD-10-CM

## 2025-05-29 LAB
BASOPHILS # BLD AUTO: 0 10E3/UL (ref 0–0.2)
BASOPHILS NFR BLD AUTO: 0 %
EOSINOPHIL # BLD AUTO: 0.2 10E3/UL (ref 0–0.7)
EOSINOPHIL NFR BLD AUTO: 3 %
IMM GRANULOCYTES # BLD: 0 10E3/UL
IMM GRANULOCYTES NFR BLD: 0 %
LYMPHOCYTES # BLD AUTO: 1.4 10E3/UL (ref 0.8–5.3)
LYMPHOCYTES NFR BLD AUTO: 25 %
MCV RBC AUTO: 90 FL (ref 78–100)
MONOCYTES # BLD AUTO: 0.9 10E3/UL (ref 0–1.3)
MONOCYTES NFR BLD AUTO: 17 %
NEUTROPHILS # BLD AUTO: 3.1 10E3/UL (ref 1.6–8.3)
NEUTROPHILS NFR BLD AUTO: 55 %
WBC # BLD AUTO: 5.6 10E3/UL (ref 4–11)

## 2025-05-29 RX ORDER — BENZONATATE 200 MG/1
200 CAPSULE ORAL 2 TIMES DAILY PRN
Qty: 14 CAPSULE | Refills: 0 | Status: SHIPPED | OUTPATIENT
Start: 2025-05-29

## 2025-05-29 RX ORDER — CODEINE PHOSPHATE AND GUAIFENESIN 10; 100 MG/5ML; MG/5ML
1-2 SOLUTION ORAL AT BEDTIME
Qty: 118 ML | Refills: 0 | Status: SHIPPED | OUTPATIENT
Start: 2025-05-29

## 2025-05-29 NOTE — PROGRESS NOTES
Acute cough  - WBC with Diff  - XR Chest 2 Views    General Tips for All Ages:    Rest and Hydration:  Allow yourself the time to rest and prioritize hydration.  Stay well-hydrated with water, clear broths, and soothing herbal teas.    Symptomatic Relief:  Over-the-counter (OTC) medications can help alleviate symptoms.  Consider non-pharmacological options like a warm saltwater gargle for soothing a sore throat.    For Infants and Children (Under 2 Years):    Nasal Saline Drops:  Use saline drops to clear nasal congestion in infants.  Administer 1-2 drops in each nostril before feeding or bedtime.    Humidifier:  Place a cool-mist humidifier in the room to ease congestion.  Remember to clean the humidifier regularly.  Okay to use [employer]     Acetaminophen (if applicable):  Use acetaminophen for fever and discomfort.  Follow dosing guidelines based on your child's weight.  Avoid aspirin in children to prevent Reye's syndrome.    Contact Pediatrician:  If symptoms persist or worsen, or if your child shows signs of respiratory distress, contact your pediatrician.    For Children (2-12 Years):    Nasal Saline Solution:  Encourage the use of saline nasal spray for congestion relief.  Teach your child to blow their nose gently.    Honey (for those over 1 year):  Use honey to soothe a cough (1-2 teaspoons as needed).  Do not give honey to children under 1 year due to the risk of botulism.    Acetaminophen or Ibuprofen:  Use acetaminophen or ibuprofen for fever and pain relief.  Follow dosing guidelines based on your child's weight.    Fluid Intake:  Ensure your child drinks warm liquids like herbal teas and broths.  Monitor their fluid intake to keep them hydrated.    For Adolescents and Adults (12 Years and Older):    Decongestants (Pseudoephedrine/Phenylephrine):  Consider OTC decongestants for nasal congestion.  Use with caution if you have hypertension, and avoid prolonged use.    Cough Suppressants  (Dextromethorphan):  Choose a cough suppressant for persistent cough.  Avoid in children under 12 years; use honey instead.  Follow package instructions and avoid multiple medications with similar ingredients.    Pain Relievers (Acetaminophen or Ibuprofen):  Use acetaminophen or ibuprofen for pain and fever.  Follow package instructions.  Take ibuprofen with food to minimize stomach upset.    Rest and Isolation:  Prioritize rest and stay at home to prevent spreading the infection.    For All Ages:    Hydration:  Ensure you stay well-hydrated; monitor urine color to gauge hydration.    Hand Hygiene:  Wash hands with soap and water for at least 20 seconds.  Use hand  with at least 60% alcohol if soap is unavailable.    Avoid Tobacco Smoke:  Stay away from tobacco smoke, which can worsen respiratory symptoms.    Seek Medical Attention:  If symptoms worsen or if you experience difficulty breathing, seek medical attention promptly.  Look out for red flag symptoms like persistent high fever, severe headache, or chest pain.    Patient advised to return to clinic for reevaluation (either UC or PCP) if symptoms do not improve in 7 days. Patient educated on red flag symptoms and asked to go directly to the ED if these symptoms present themselves.     Remember, this guide is meant to assist you, but individual cases may vary. If you have concerns or if symptoms persist, don't hesitate to reach out to a healthcare professional. Wishing you a speedy recovery!      Jonathan Hercules PA-C  [facility]    Subjective   69 year old who presents to clinic today for the following health issues:    Urgent Care, Nasal Congestion, and Cough       HPI     Acute Illness  Acute illness concerns: Cough and congestion   Onset/Duration: 3 days   Symptoms:  Fever: No  Chills/Sweats: No  Headache (location?): No  Sinus Pressure: No  Conjunctivitis:  No  Ear Pain: no  Rhinorrhea: YES  Congestion: YES  Sore Throat: Mild  from the post nasal drip - This has improved   Cough: YES  Wheeze: Yes- Patient has had some mild asthma in the past   Decreased Appetite: YES  Nausea: No  Vomiting: No  Diarrhea: No  Dysuria/Freq.: No  Dysuria or Hematuria: No  Fatigue/Achiness: No  Sick/Strep Exposure: Toddler with runny nose   Therapies tried and outcome: None     Review of Systems   Review of Systems   See HPI    Objective    Temp: 97.7  F (36.5  C) Temp src: Temporal BP: 110/70 Pulse: 55   Resp: 17 SpO2: 96 %       Physical Exam   Physical Exam  Constitutional:       General: He is not in acute distress.     Appearance: Normal appearance. He is normal weight. He is not ill-appearing, toxic-appearing or diaphoretic.   HENT:      Head: Normocephalic and atraumatic.      Right Ear: Tympanic membrane, ear canal and external ear normal. There is no impacted cerumen.      Left Ear: Tympanic membrane, ear canal and external ear normal. There is no impacted cerumen.      Nose: Congestion and rhinorrhea present.      Mouth/Throat:      Pharynx: No oropharyngeal exudate or posterior oropharyngeal erythema.   Cardiovascular:      Rate and Rhythm: Normal rate and regular rhythm.      Pulses: Normal pulses.      Heart sounds: Normal heart sounds. No murmur heard.     No friction rub. No gallop.   Pulmonary:      Effort: Pulmonary effort is normal. No respiratory distress.      Breath sounds: Normal breath sounds. No stridor. No wheezing, rhonchi or rales.   Chest:      Chest wall: No tenderness.   Lymphadenopathy:      Cervical: No cervical adenopathy.   Neurological:      Mental Status: He is alert.   Psychiatric:         Mood and Affect: Mood normal.         Behavior: Behavior normal.         Thought Content: Thought content normal.         Judgment: Judgment normal.          Results for orders placed or performed in visit on 05/29/25 (from the past 24 hours)   WBC with Diff    Narrative    The following orders were created for panel order WBC with  Diff.  Procedure                               Abnormality         Status                     ---------                               -----------         ------                     WBC and Differential[[id number]]                            Final result                 Please view results for these tests on the individual orders.   WBC and Differential   Result Value Ref Range    WBC Count 5.6 4.0 - 11.0 10e3/uL    MCV 90 78 - 100 fL    % Neutrophils 55 %    % Lymphocytes 25 %    % Monocytes 17 %    % Eosinophils 3 %    % Basophils 0 %    % Immature Granulocytes 0 %    Absolute Neutrophils 3.1 1.6 - 8.3 10e3/uL    Absolute Lymphocytes 1.4 0.8 - 5.3 10e3/uL    Absolute Monocytes 0.9 0.0 - 1.3 10e3/uL    Absolute Eosinophils 0.2 0.0 - 0.7 10e3/uL    Absolute Basophils 0.0 0.0 - 0.2 10e3/uL    Absolute Immature Granulocytes 0.0 <=0.4 10e3/uL   XR Chest 2 Views    Narrative    EXAM: XR CHEST 2 VIEWS  LOCATION: [facility]  DATE: 5/29/2025    INDICATION:  Acute cough  COMPARISON: CT 12/28/2023      Impression    IMPRESSION:   1. Stable normal cardiomediastinal silhouette. Left anterior chest wall cardiac device.  2. No acute cardiopulmonary process.

## 2025-05-29 NOTE — PROGRESS NOTES
Urgent Care Clinic Visit    Chief Complaint   Patient presents with    Urgent Care    Nasal Congestion     X3 days of congestion     Cough     X3 days of a Productive cough  Pain in ribs from coughing                5/29/2025    12:33 PM   Additional Questions   Roomed by surinder espino

## 2025-06-19 PROBLEM — M54.9 BACK PAIN, UNSPECIFIED BACK LOCATION, UNSPECIFIED BACK PAIN LATERALITY, UNSPECIFIED CHRONICITY: Status: RESOLVED | Noted: 2025-04-03 | Resolved: 2025-06-19

## 2025-07-15 DIAGNOSIS — I10 ESSENTIAL HYPERTENSION, MALIGNANT: ICD-10-CM

## 2025-07-15 DIAGNOSIS — R73.03 DIABETES MELLITUS, LATENT: Primary | ICD-10-CM

## 2025-07-24 NOTE — TELEPHONE ENCOUNTER
Called patient to clarify if he was off his hydrochlorothiazide previously, message left to call back.     Addendum 1505: Spoke to patient who confirmed he has been off hydrochlorothiazide since his hospitalization in December, says he was directed to stop it due to hypokalemia, hyponatremia (K 2.9, Na 134 on 12/28, both have since normalized). He is concerned because he has checked his BP three times since this morning it has been slowly rising throughout the day, now up to the 160's and he is out of hydrochlorothiazide.   Regarding the PVCs he says he is concerned/there was concern previously about increasing the dose due to resting bradycardia, however he is worried that he is having 4-5 PVCs a minute.     Dr Boles messaged to review in Dr Corley's absence.       bAbe Corley MD  You16 minutes ago (9:27 AM)     Why is he off hydrochlorothiazide?  Previously he had increased PVCs because of decrease in his beta-blocker dosing because of side effects.  We should refill his hydrochlorothiazide if there is no obvious reason why not       Daquan Ascencio [facility] Heart Team 2 (supporting Abbe Corley MD)1 hour ago (7:50 AM)     Update:  I was talking with my sister-in-law who is an internist last night because I was concerned about the BP spike.       At her direction, I took 25mg of hydrochlorothiazide last evening, and then again this morning.  As of 6am my BP had come down to 130/80.  These were the last two pills that I had from my previous prescription.  So one of the questions I have is should the prescription for the hydrochlorothiazide be written again for me?     I have also noted a significant increase in the number of PVCs recently.      Abbe Corley MD Theis, Marcie J RN19 hours ago (7:45 PM)     Shane is most likely due to stress and anxiety.  Lets watch it a couple more days.  Blood pressure is always been very well-controlled before that.  Watch his salt, alcohol, make sure he  [facility]    Date of Visit:  7/24/2025      History of Present Illness:       Only experiences palpitations with exertion.  Being preventive is his main goal. Understands that he will likely need surery        Past Medical History:    Endocarditis                                    03/14/2013      Comment: 3/2005      Past Surgical History:  There is no previous surgical history on file.    ALLERGIES:  Patient has no known allergies.    Current Medications    MELATONIN 3 MG    Take 1 tablet by mouth at bedtime as needed (insomnia).       Social History:       Family History:   Father 2020 epilepsy, alcohol, mother just had open heart surgery FMLH she was in the hospital for 6 months. 1/2025 - 6/2025, 2 sisters 4 brothers 3 brothers mother , 1 from mother sister, the rest are from father, older brothers NONI, narcolepsy, 2.1 miles walk    Review of Systems:   A 12-point review of systems was performed which was negative, except for those noted in the HPI above.     Physical Examination:   Visit Vitals  /78 (BP Location: LFA - Left forearm, Patient Position: Sitting, Cuff Size: Regular)   Pulse 77   Ht 5' 10\" (1.778 m)   Wt 131 kg (288 lb 12.8 oz)   SpO2 98%   BMI 41.44 kg/m²       General: Well groomed, well nourished in no acute distress.   Psychiatric: Awake, alert and oriented x 3. Mood and affect within normal limits.  Skin: Warm, pink and dry without evidence of rashes or lesions.   Neck:  Jugular venous pressure. Carotid upstrokes are normal without bruits.   Cardiac: Distant heart sounds. PMI is non-displaced. Normal intensity S1, Persistently split S2. There is 1-2/6 MONICA heard loudest at the left USB. No click or gallop noted. Radial pulses +3 bilaterally  Pulmonary: Lungs clear to auscultation bilaterally. No rales, rhonchi or wheezing.   Abdomen: Soft, nontender, positive bowel sounds. No abdominal masses or bruit noted.   Extremities: No edema, cyanosis or clubbing.     Diagnostic  Results:   Preliminary Echo 7/24/2025:       ECG 7/24/2025:      Recent Labs     05/30/25  1149   SODIUM 140   POTASSIUM 4.4   CHLORIDE 106   CO2 27   ANIONGAP 11   GLUCOSE 98   BUN 10   CREATININE 1.04   GFRESTIMATE >90   CALCIUM 9.6   BILIRUBIN 0.4   AST 34   GPT 93*   ALKPT 90   ALBUMIN 3.9   TOTPROTEIN 7.0   GLOB 3.1   AGR 1.3      Recent Labs     05/30/25  1149   WBC 7.5   RBC 5.24   HGB 14.9   HCT 44.9   MCV 85.7   MCH 28.4   MCHC 33.2      RDWCV 14.5   RDWSD 44.1   NRBCRE 0      Recent Labs     05/30/25  1149   CHOLESTEROL 151   TRIGLYCERIDE 71   HDL 51   CALCLDL 86   NONHDL 100   CHOHDL 3.0        Assessment and Plan:       We discussed with the patient the results of the clinical evaluation and echocardiogram from today.    All *** questions and concerns were addressed. *** is encouraged to call the office with any additional concerns. We will plan on seeing *** back in ***.     We thank  *** for this kind referral.     JAIME Treivno    *** minutes face to face time spent with patient greater than 50% of time spent counseling and coordinating care.    On 7/24/2025, Edyta TIERNEY RN scribed the services personally performed by {[id number]}        CC: ***     has not taken any NSAIDs.  Exercise daily.  Eat lots of fresh fruits and vegetables.  Is he getting good sleep?  Call back in a couple of days if it remains high.  I am worried if I introduced a new medicine and he relaxes and BP comes down he will then be hypotensive      or lesions.   Neck:  Jugular venous pressure. Carotid upstrokes are normal without bruits.   Cardiac: Distant heart sounds. PMI is non-displaced. Normal intensity S1, Persistently split S2. There is 1-2/6 MONICA heard loudest at the left USB. No click or gallop noted. Radial pulses +3 bilaterally  Pulmonary: Lungs clear to auscultation bilaterally. No rales, rhonchi or wheezing.   Abdomen: Soft, nontender, positive bowel sounds. No abdominal masses or bruit noted.   Extremities: No edema, cyanosis or clubbing.     Diagnostic Results:     7/24/2025 Echo  Pulmonary atresia with ventricular septal defect.  S/P VSD closure and RV-PA conduit placement (2005). No residual L-R shunt.  S/P RV-PA conduit replacement with left branch pulmonary arterioplasty ([facility], 12/14/2016).  RV-PA conduit; peak gradient = 19 mmHg; mean gradient = 11 mmHg. Trivial regurgitation.  Upper Normal RV chamber size. Decreased RV systolic function. RV Strain, -10%. RVSP = 37 mmHg.  Normal left ventricular cavity size, mild LVH. Normal global LV systolic function; LVEF 56%.  Unequal size aortic valve cusps (Large R-L \T\ small NCC) . Sclerotic R-L cusps.. Mild eccentric AR.  Dilated sinuses of Valsalva (43 mm). Z-Score = 3.18. Left aortic arch.  No pericardial effusion.    ECG 7/24/2025:Normal sinus rhythm Right bundle branch block Abnormal ECG When compared with ECG of 22-Jul-2025 14:25, No significant change was found Confirmed by EUSEBIA DE LA ROSA, KARIE     Recent Labs     05/30/25  1149   SODIUM 140   POTASSIUM 4.4   CHLORIDE 106   CO2 27   ANIONGAP 11   GLUCOSE 98   BUN 10   CREATININE 1.04   GFRESTIMATE >90   CALCIUM 9.6   BILIRUBIN 0.4   AST 34   GPT 93*   ALKPT 90   ALBUMIN 3.9   TOTPROTEIN 7.0   GLOB 3.1   AGR 1.3        Recent Labs     05/30/25  1149   WBC 7.5   RBC 5.24   HGB 14.9   HCT 44.9   MCV 85.7   MCH 28.4   MCHC 33.2      RDWCV 14.5   RDWSD 44.1   NRBCRE 0        Recent Labs     05/30/25  1149   CHOLESTEROL 151    TRIGLYCERIDE 71   HDL 51   CALCLDL 86   NONHDL 100   CHOHDL 3.0          Assessment and Plan:   Pulmonary atresia with ventricular septal defect.  S/P VSD closure and RV-PA conduit placement (2005). No residual L-R shunt.  S/P RV-PA conduit replacement with left branch pulmonary arterioplasty ([facility], 12/14/2016).  RV-PA conduit; peak gradient = 19 mmHg; mean gradient = 11 mmHg. Trivial regurgitation.  Upper Normal RV chamber size. Decreased RV systolic function. RV Strain, -10%. RVSP = 37 mmHg.  Normal left ventricular cavity size, mild LVH. Normal global LV systolic function; LVEF 56%.  Unequal size aortic valve cusps (Large R-L \T\ small NCC) . Sclerotic R-L cusps.. Mild eccentric AR.  Dilated sinuses of Valsalva (43 mm). Z-Score = 3.18. Left aortic arch.    We discussed with the patient the results of the clinical evaluation and echocardiogram from today. He is currently asymptomatic, not on any cardiac medications, and doing well clinically. Today's echocardiogram shows a well-functioning RV-PA conduit with a peak gradient of 19 mmHg and trivial regurgitation. Left ventricular function is preserved with an ejection fraction of 56% and mild concentric hypertrophy. There is mild eccentric aortic regurgitation associated with sclerotic and unequal aortic valve cusps, and the sinuses of Valsalva are dilated at 43 mm (Z-score 3.18).     At this time, no intervention is indicated. Our plan is to continue surveillance with repeat echocardiography in 6 - 12 months to monitor RV function, conduit performance, and aortic root dimensions. Cardiac MRI may be considered if echo windows become suboptimal or if clinical status changes. He should continue physical activity as tolerated, maintain endocarditis prophylaxis with amoxicillin prior to dental procedures, and remain in good contact with our office ongoing with symptom changes.     All of his questions and concerns were addressed. He is encouraged to call the  office with any additional concerns. We will plan on seeing him back in 1 year.     We thank Dr. Hardy for this kind referral.     On 7/24/2025, IEdyta RN scribed the services personally performed by Sabrina Mckeon MD    The documentation recorded by the scribe accurately and completely reflects the service(s) I personally performed and the decisions made by me.     MYLENE Mckeon MD

## 2025-09-02 ENCOUNTER — TELEPHONE (OUTPATIENT)
Dept: CARDIOLOGY | Facility: CLINIC | Age: 70
End: 2025-09-02

## 2025-09-03 DIAGNOSIS — I10 ESSENTIAL HYPERTENSION, MALIGNANT: ICD-10-CM

## 2025-09-03 DIAGNOSIS — N18.2 CHRONIC KIDNEY DISEASE, STAGE II (MILD): Primary | ICD-10-CM

## (undated) DEVICE — KIT HAND CONTROL ANGIOTOUCH ACIST 65CM AT-P65

## (undated) DEVICE — CATH GUIDING GUIDELINER JR3.5 ST CURVE 5.5FR COAST 5270

## (undated) DEVICE — GUIDEWIRE VASC 0.014INX180CM RUNTHROUGH 25-1011

## (undated) DEVICE — GUIDEWIRE ASAHI FIELDER XT 190

## (undated) DEVICE — CATH BALLOON EMERGE 2.5X30MM H7493918930250

## (undated) DEVICE — CATH ANGIO INFINITI 3DRC 4FRX100CM 538476

## (undated) DEVICE — MANIFOLD KIT ANGIO AUTOMATED 014613

## (undated) DEVICE — CATH BALLOON NC EMERGE 4.00X20MM H7493926720400

## (undated) DEVICE — CATH DIAG 4FR JL 4.5 538417

## (undated) DEVICE — CATH RX TAKERU OTW PTCA BALLOON 1.5X15MM DC-RY1515UA1

## (undated) DEVICE — INFL DVC BASIXCOMPAK PLYCRB 30 ATM 13IN 20ML IN4530

## (undated) DEVICE — CATH LAUNCHER 6FR JR 4.0 LA6JR40

## (undated) DEVICE — NDL PERC ENTRY THINWALL 18GA 7.0" G00166

## (undated) DEVICE — INTRODUCER SHEATH GREEN 6.5FRX11CM .038IN PSI-6F-11-038ACT

## (undated) DEVICE — INTRO SHEATH 4FRX10CM PINNACLE RSS402

## (undated) RX ORDER — FENTANYL CITRATE 50 UG/ML
INJECTION, SOLUTION INTRAMUSCULAR; INTRAVENOUS
Status: DISPENSED
Start: 2023-12-29

## (undated) RX ORDER — HEPARIN SODIUM 1000 [USP'U]/ML
INJECTION, SOLUTION INTRAVENOUS; SUBCUTANEOUS
Status: DISPENSED
Start: 2023-12-29

## (undated) RX ORDER — LIDOCAINE HYDROCHLORIDE 10 MG/ML
INJECTION, SOLUTION EPIDURAL; INFILTRATION; INTRACAUDAL; PERINEURAL
Status: DISPENSED
Start: 2023-12-29

## (undated) RX ORDER — REGADENOSON 0.08 MG/ML
INJECTION, SOLUTION INTRAVENOUS
Status: DISPENSED
Start: 2022-11-02

## (undated) RX ORDER — HEPARIN SODIUM 200 [USP'U]/100ML
INJECTION, SOLUTION INTRAVENOUS
Status: DISPENSED
Start: 2023-12-29

## (undated) RX ORDER — NITROGLYCERIN 5 MG/ML
VIAL (ML) INTRAVENOUS
Status: DISPENSED
Start: 2023-12-29